# Patient Record
Sex: FEMALE | Race: WHITE | NOT HISPANIC OR LATINO
[De-identification: names, ages, dates, MRNs, and addresses within clinical notes are randomized per-mention and may not be internally consistent; named-entity substitution may affect disease eponyms.]

---

## 2017-02-27 ENCOUNTER — APPOINTMENT (OUTPATIENT)
Dept: FAMILY MEDICINE | Facility: CLINIC | Age: 82
End: 2017-02-27

## 2017-02-27 VITALS
DIASTOLIC BLOOD PRESSURE: 70 MMHG | HEIGHT: 63.5 IN | BODY MASS INDEX: 24.85 KG/M2 | WEIGHT: 142 LBS | SYSTOLIC BLOOD PRESSURE: 110 MMHG

## 2017-02-27 DIAGNOSIS — Z87.891 PERSONAL HISTORY OF NICOTINE DEPENDENCE: ICD-10-CM

## 2017-02-27 DIAGNOSIS — Z86.39 PERSONAL HISTORY OF OTHER ENDOCRINE, NUTRITIONAL AND METABOLIC DISEASE: ICD-10-CM

## 2017-02-27 DIAGNOSIS — Z87.898 PERSONAL HISTORY OF OTHER SPECIFIED CONDITIONS: ICD-10-CM

## 2017-02-27 DIAGNOSIS — Z80.1 FAMILY HISTORY OF MALIGNANT NEOPLASM OF TRACHEA, BRONCHUS AND LUNG: ICD-10-CM

## 2017-02-27 DIAGNOSIS — Z83.3 FAMILY HISTORY OF DIABETES MELLITUS: ICD-10-CM

## 2017-02-27 DIAGNOSIS — Z82.49 FAMILY HISTORY OF ISCHEMIC HEART DISEASE AND OTHER DISEASES OF THE CIRCULATORY SYSTEM: ICD-10-CM

## 2017-03-20 ENCOUNTER — OTHER (OUTPATIENT)
Age: 82
End: 2017-03-20

## 2017-03-22 ENCOUNTER — RX RENEWAL (OUTPATIENT)
Age: 82
End: 2017-03-22

## 2017-03-22 ENCOUNTER — MEDICATION RENEWAL (OUTPATIENT)
Age: 82
End: 2017-03-22

## 2017-06-21 ENCOUNTER — MEDICATION RENEWAL (OUTPATIENT)
Age: 82
End: 2017-06-21

## 2017-07-02 ENCOUNTER — RX RENEWAL (OUTPATIENT)
Age: 82
End: 2017-07-02

## 2017-07-10 ENCOUNTER — APPOINTMENT (OUTPATIENT)
Dept: FAMILY MEDICINE | Facility: CLINIC | Age: 82
End: 2017-07-10

## 2017-07-10 VITALS
SYSTOLIC BLOOD PRESSURE: 140 MMHG | HEIGHT: 63.5 IN | DIASTOLIC BLOOD PRESSURE: 84 MMHG | BODY MASS INDEX: 25.9 KG/M2 | WEIGHT: 148 LBS

## 2017-07-10 DIAGNOSIS — E55.9 VITAMIN D DEFICIENCY, UNSPECIFIED: ICD-10-CM

## 2017-07-10 DIAGNOSIS — Z00.00 ENCOUNTER FOR GENERAL ADULT MEDICAL EXAMINATION W/OUT ABNORMAL FINDINGS: ICD-10-CM

## 2017-07-10 RX ORDER — MULTIVIT-MIN/FOLIC/VIT K/LYCOP 400-300MCG
25 MCG TABLET ORAL DAILY
Qty: 90 | Refills: 1 | Status: DISCONTINUED | COMMUNITY
Start: 2017-03-20 | End: 2017-07-10

## 2017-08-24 ENCOUNTER — APPOINTMENT (OUTPATIENT)
Dept: GASTROENTEROLOGY | Facility: CLINIC | Age: 82
End: 2017-08-24
Payer: MEDICARE

## 2017-08-24 VITALS
RESPIRATION RATE: 16 BRPM | SYSTOLIC BLOOD PRESSURE: 162 MMHG | BODY MASS INDEX: 25.69 KG/M2 | HEIGHT: 63 IN | WEIGHT: 145 LBS | HEART RATE: 95 BPM | OXYGEN SATURATION: 98 % | DIASTOLIC BLOOD PRESSURE: 88 MMHG

## 2017-08-24 PROCEDURE — 99204 OFFICE O/P NEW MOD 45 MIN: CPT

## 2017-08-24 PROCEDURE — 99358 PROLONG SERVICE W/O CONTACT: CPT

## 2017-08-30 DIAGNOSIS — E21.3 HYPERPARATHYROIDISM, UNSPECIFIED: ICD-10-CM

## 2017-08-31 ENCOUNTER — CLINICAL ADVICE (OUTPATIENT)
Age: 82
End: 2017-08-31

## 2017-08-31 LAB
AFP-TM SERPL-MCNC: 4 NG/ML
ALBUMIN SERPL ELPH-MCNC: 3.9 G/DL
ALP BLD-CCNC: 97 U/L
ALT SERPL-CCNC: 25 U/L
ANION GAP SERPL CALC-SCNC: 16 MMOL/L
AST SERPL-CCNC: 36 U/L
BASOPHILS # BLD AUTO: 0.03 K/UL
BASOPHILS NFR BLD AUTO: 0.5 %
BILIRUB SERPL-MCNC: 0.8 MG/DL
BUN SERPL-MCNC: 32 MG/DL
CALCIUM SERPL-MCNC: 10 MG/DL
CHLORIDE SERPL-SCNC: 99 MMOL/L
CO2 SERPL-SCNC: 23 MMOL/L
CREAT SERPL-MCNC: 1.68 MG/DL
EOSINOPHIL # BLD AUTO: 0.16 K/UL
EOSINOPHIL NFR BLD AUTO: 2.7 %
GLUCOSE SERPL-MCNC: 182 MG/DL
HCT VFR BLD CALC: 44.4 %
HGB BLD-MCNC: 14.7 G/DL
IMM GRANULOCYTES NFR BLD AUTO: 0.7 %
LYMPHOCYTES # BLD AUTO: 0.78 K/UL
LYMPHOCYTES NFR BLD AUTO: 13 %
MAN DIFF?: NORMAL
MCHC RBC-ENTMCNC: 31.4 PG
MCHC RBC-ENTMCNC: 33.1 GM/DL
MCV RBC AUTO: 94.9 FL
MONOCYTES # BLD AUTO: 0.64 K/UL
MONOCYTES NFR BLD AUTO: 10.6 %
NEUTROPHILS # BLD AUTO: 4.37 K/UL
NEUTROPHILS NFR BLD AUTO: 72.5 %
PLATELET # BLD AUTO: 125 K/UL
POTASSIUM SERPL-SCNC: 5.6 MMOL/L
PROT SERPL-MCNC: 6.4 G/DL
RBC # BLD: 4.68 M/UL
RBC # FLD: 13.3 %
SODIUM SERPL-SCNC: 138 MMOL/L
WBC # FLD AUTO: 6.02 K/UL

## 2017-09-07 ENCOUNTER — OUTPATIENT (OUTPATIENT)
Dept: OUTPATIENT SERVICES | Facility: HOSPITAL | Age: 82
LOS: 1 days | End: 2017-09-07
Payer: MEDICARE

## 2017-09-07 ENCOUNTER — APPOINTMENT (OUTPATIENT)
Dept: ULTRASOUND IMAGING | Facility: CLINIC | Age: 82
End: 2017-09-07
Payer: MEDICARE

## 2017-09-07 DIAGNOSIS — K74.60 UNSPECIFIED CIRRHOSIS OF LIVER: ICD-10-CM

## 2017-09-07 PROCEDURE — 76700 US EXAM ABDOM COMPLETE: CPT | Mod: 26

## 2017-09-07 PROCEDURE — 76700 US EXAM ABDOM COMPLETE: CPT

## 2017-09-08 LAB
ALBUMIN SERPL ELPH-MCNC: 3.8 G/DL
ALP BLD-CCNC: 81 U/L
ALT SERPL-CCNC: 23 U/L
ANION GAP SERPL CALC-SCNC: 17 MMOL/L
AST SERPL-CCNC: 39 U/L
BILIRUB SERPL-MCNC: 1.3 MG/DL
BUN SERPL-MCNC: 33 MG/DL
CALCIUM SERPL-MCNC: 9.2 MG/DL
CHLORIDE SERPL-SCNC: 103 MMOL/L
CO2 SERPL-SCNC: 20 MMOL/L
CREAT SERPL-MCNC: 1.49 MG/DL
GLUCOSE SERPL-MCNC: 120 MG/DL
POTASSIUM SERPL-SCNC: 4.6 MMOL/L
PROT SERPL-MCNC: 6.1 G/DL
SODIUM SERPL-SCNC: 140 MMOL/L

## 2017-09-29 ENCOUNTER — CLINICAL ADVICE (OUTPATIENT)
Age: 82
End: 2017-09-29

## 2017-10-13 LAB
ANION GAP SERPL CALC-SCNC: 14 MMOL/L
BUN SERPL-MCNC: 13 MG/DL
CALCIUM SERPL-MCNC: 9.4 MG/DL
CHLORIDE SERPL-SCNC: 106 MMOL/L
CO2 SERPL-SCNC: 24 MMOL/L
CREAT SERPL-MCNC: 1.02 MG/DL
GLUCOSE SERPL-MCNC: 121 MG/DL
POTASSIUM SERPL-SCNC: 4.4 MMOL/L
SODIUM SERPL-SCNC: 144 MMOL/L

## 2017-11-15 ENCOUNTER — MOBILE ON CALL (OUTPATIENT)
Age: 82
End: 2017-11-15

## 2017-11-27 ENCOUNTER — APPOINTMENT (OUTPATIENT)
Dept: FAMILY MEDICINE | Facility: CLINIC | Age: 82
End: 2017-11-27
Payer: MEDICARE

## 2017-11-27 ENCOUNTER — NON-APPOINTMENT (OUTPATIENT)
Age: 82
End: 2017-11-27

## 2017-11-27 VITALS
DIASTOLIC BLOOD PRESSURE: 74 MMHG | WEIGHT: 168 LBS | SYSTOLIC BLOOD PRESSURE: 164 MMHG | HEIGHT: 63 IN | BODY MASS INDEX: 29.77 KG/M2

## 2017-11-27 DIAGNOSIS — I35.0 NONRHEUMATIC AORTIC (VALVE) STENOSIS: ICD-10-CM

## 2017-11-27 PROCEDURE — 99214 OFFICE O/P EST MOD 30 MIN: CPT | Mod: 25

## 2017-11-27 PROCEDURE — 93000 ELECTROCARDIOGRAM COMPLETE: CPT

## 2017-11-27 RX ORDER — SPIRONOLACTONE 100 MG/1
100 TABLET ORAL DAILY
Qty: 90 | Refills: 0 | Status: DISCONTINUED | COMMUNITY
Start: 2017-07-02 | End: 2017-11-27

## 2018-01-02 ENCOUNTER — LABORATORY RESULT (OUTPATIENT)
Age: 83
End: 2018-01-02

## 2018-01-11 LAB
ALBUMIN SERPL ELPH-MCNC: 3.7 G/DL
ALP BLD-CCNC: 154 U/L
ALT SERPL-CCNC: 14 U/L
ANION GAP SERPL CALC-SCNC: 12 MMOL/L
AST SERPL-CCNC: 42 U/L
BASOPHILS # BLD AUTO: 0.02 K/UL
BASOPHILS NFR BLD AUTO: 0.5 %
BILIRUB SERPL-MCNC: 0.7 MG/DL
BUN SERPL-MCNC: 21 MG/DL
CALCIUM SERPL-MCNC: 9.1 MG/DL
CHLORIDE SERPL-SCNC: 107 MMOL/L
CHOLEST SERPL-MCNC: 190 MG/DL
CHOLEST/HDLC SERPL: 2.6 RATIO
CO2 SERPL-SCNC: 25 MMOL/L
CREAT SERPL-MCNC: 1.14 MG/DL
EOSINOPHIL # BLD AUTO: 0.18 K/UL
EOSINOPHIL NFR BLD AUTO: 4.7
GLUCOSE SERPL-MCNC: 133 MG/DL
HCT VFR BLD CALC: 41.9 %
HDLC SERPL-MCNC: 72 MG/DL
HGB BLD-MCNC: 13.2 G/DL
IMM GRANULOCYTES NFR BLD AUTO: 0.3 %
LDLC SERPL CALC-MCNC: 96 MG/DL
LYMPHOCYTES # BLD AUTO: 0.56 K/UL
LYMPHOCYTES NFR BLD AUTO: 14.8 %
MAN DIFF?: NORMAL
MCHC RBC-ENTMCNC: 30.9 PG
MCHC RBC-ENTMCNC: 31.5 GM/DL
MCV RBC AUTO: 98.1 FL
MONOCYTES # BLD AUTO: 0.49 K/UL
MONOCYTES NFR BLD AUTO: 12.9 %
NEUTROPHILS # BLD AUTO: 2.53 K/UL
NEUTROPHILS NFR BLD AUTO: 66.8 %
PLATELET # BLD AUTO: 98 K/UL
POTASSIUM SERPL-SCNC: 4.6 MMOL/L
PROT SERPL-MCNC: 5.9 G/DL
RBC # BLD: 4.27 M/UL
RBC # FLD: 14.3 %
SODIUM SERPL-SCNC: 144 MMOL/L
TRIGL SERPL-MCNC: 108 MG/DL
TSH SERPL-ACNC: 4.49 UIU/ML
WBC # FLD AUTO: 3.79 K/UL

## 2018-01-17 ENCOUNTER — APPOINTMENT (OUTPATIENT)
Dept: FAMILY MEDICINE | Facility: CLINIC | Age: 83
End: 2018-01-17
Payer: MEDICARE

## 2018-01-17 VITALS — DIASTOLIC BLOOD PRESSURE: 64 MMHG | SYSTOLIC BLOOD PRESSURE: 130 MMHG

## 2018-01-17 VITALS
TEMPERATURE: 98 F | WEIGHT: 159 LBS | BODY MASS INDEX: 28.17 KG/M2 | HEIGHT: 63 IN | DIASTOLIC BLOOD PRESSURE: 80 MMHG | SYSTOLIC BLOOD PRESSURE: 148 MMHG

## 2018-01-17 DIAGNOSIS — R94.6 ABNORMAL RESULTS OF THYROID FUNCTION STUDIES: ICD-10-CM

## 2018-01-17 DIAGNOSIS — Z86.39 PERSONAL HISTORY OF OTHER ENDOCRINE, NUTRITIONAL AND METABOLIC DISEASE: ICD-10-CM

## 2018-01-17 PROCEDURE — G0009: CPT

## 2018-01-17 PROCEDURE — 99214 OFFICE O/P EST MOD 30 MIN: CPT | Mod: 25

## 2018-01-17 PROCEDURE — 90670 PCV13 VACCINE IM: CPT

## 2018-02-13 ENCOUNTER — OUTPATIENT (OUTPATIENT)
Dept: OUTPATIENT SERVICES | Facility: HOSPITAL | Age: 83
LOS: 1 days | End: 2018-02-13
Payer: MEDICARE

## 2018-02-13 ENCOUNTER — TRANSCRIPTION ENCOUNTER (OUTPATIENT)
Age: 83
End: 2018-02-13

## 2018-02-13 VITALS
TEMPERATURE: 98 F | HEART RATE: 81 BPM | RESPIRATION RATE: 20 BRPM | SYSTOLIC BLOOD PRESSURE: 167 MMHG | OXYGEN SATURATION: 100 % | DIASTOLIC BLOOD PRESSURE: 73 MMHG

## 2018-02-13 VITALS
DIASTOLIC BLOOD PRESSURE: 60 MMHG | HEART RATE: 60 BPM | SYSTOLIC BLOOD PRESSURE: 129 MMHG | RESPIRATION RATE: 18 BRPM | OXYGEN SATURATION: 97 %

## 2018-02-13 DIAGNOSIS — Z90.710 ACQUIRED ABSENCE OF BOTH CERVIX AND UTERUS: Chronic | ICD-10-CM

## 2018-02-13 DIAGNOSIS — R93.1 ABNORMAL FINDINGS ON DIAGNOSTIC IMAGING OF HEART AND CORONARY CIRCULATION: ICD-10-CM

## 2018-02-13 DIAGNOSIS — Z98.49 CATARACT EXTRACTION STATUS, UNSPECIFIED EYE: Chronic | ICD-10-CM

## 2018-02-13 DIAGNOSIS — Z90.49 ACQUIRED ABSENCE OF OTHER SPECIFIED PARTS OF DIGESTIVE TRACT: Chronic | ICD-10-CM

## 2018-02-13 DIAGNOSIS — Z98.890 OTHER SPECIFIED POSTPROCEDURAL STATES: Chronic | ICD-10-CM

## 2018-02-13 DIAGNOSIS — I10 ESSENTIAL (PRIMARY) HYPERTENSION: ICD-10-CM

## 2018-02-13 DIAGNOSIS — R94.31 ABNORMAL ELECTROCARDIOGRAM [ECG] [EKG]: ICD-10-CM

## 2018-02-13 DIAGNOSIS — R07.9 CHEST PAIN, UNSPECIFIED: ICD-10-CM

## 2018-02-13 LAB
ANION GAP SERPL CALC-SCNC: 12 MMOL/L — SIGNIFICANT CHANGE UP (ref 5–17)
APTT BLD: 34 SEC — SIGNIFICANT CHANGE UP (ref 27.5–37.4)
BUN SERPL-MCNC: 18 MG/DL — SIGNIFICANT CHANGE UP (ref 8–20)
CALCIUM SERPL-MCNC: 9 MG/DL — SIGNIFICANT CHANGE UP (ref 8.6–10.2)
CHLORIDE SERPL-SCNC: 105 MMOL/L — SIGNIFICANT CHANGE UP (ref 98–107)
CO2 SERPL-SCNC: 25 MMOL/L — SIGNIFICANT CHANGE UP (ref 22–29)
CREAT SERPL-MCNC: 0.83 MG/DL — SIGNIFICANT CHANGE UP (ref 0.5–1.3)
GLUCOSE SERPL-MCNC: 101 MG/DL — SIGNIFICANT CHANGE UP (ref 70–115)
HCT VFR BLD CALC: 41.6 % — SIGNIFICANT CHANGE UP (ref 37–47)
HGB BLD-MCNC: 13.7 G/DL — SIGNIFICANT CHANGE UP (ref 12–16)
INR BLD: 1.12 RATIO — SIGNIFICANT CHANGE UP (ref 0.88–1.16)
MCHC RBC-ENTMCNC: 31.1 PG — HIGH (ref 27–31)
MCHC RBC-ENTMCNC: 32.9 G/DL — SIGNIFICANT CHANGE UP (ref 32–36)
MCV RBC AUTO: 94.3 FL — SIGNIFICANT CHANGE UP (ref 81–99)
PLATELET # BLD AUTO: 84 K/UL — LOW (ref 150–400)
POTASSIUM SERPL-MCNC: 4.2 MMOL/L — SIGNIFICANT CHANGE UP (ref 3.5–5.3)
POTASSIUM SERPL-SCNC: 4.2 MMOL/L — SIGNIFICANT CHANGE UP (ref 3.5–5.3)
PROTHROM AB SERPL-ACNC: 12.3 SEC — SIGNIFICANT CHANGE UP (ref 9.8–12.7)
RBC # BLD: 4.41 M/UL — SIGNIFICANT CHANGE UP (ref 4.4–5.2)
RBC # FLD: 13.6 % — SIGNIFICANT CHANGE UP (ref 11–15.6)
SODIUM SERPL-SCNC: 142 MMOL/L — SIGNIFICANT CHANGE UP (ref 135–145)
WBC # BLD: 3.6 K/UL — LOW (ref 4.8–10.8)
WBC # FLD AUTO: 3.6 K/UL — LOW (ref 4.8–10.8)

## 2018-02-13 PROCEDURE — C1769: CPT

## 2018-02-13 PROCEDURE — 80048 BASIC METABOLIC PNL TOTAL CA: CPT

## 2018-02-13 PROCEDURE — 85730 THROMBOPLASTIN TIME PARTIAL: CPT

## 2018-02-13 PROCEDURE — 93010 ELECTROCARDIOGRAM REPORT: CPT

## 2018-02-13 PROCEDURE — C1894: CPT

## 2018-02-13 PROCEDURE — 85027 COMPLETE CBC AUTOMATED: CPT

## 2018-02-13 PROCEDURE — C1887: CPT

## 2018-02-13 PROCEDURE — 36415 COLL VENOUS BLD VENIPUNCTURE: CPT

## 2018-02-13 PROCEDURE — 99152 MOD SED SAME PHYS/QHP 5/>YRS: CPT

## 2018-02-13 PROCEDURE — 93005 ELECTROCARDIOGRAM TRACING: CPT

## 2018-02-13 PROCEDURE — 85610 PROTHROMBIN TIME: CPT

## 2018-02-13 PROCEDURE — 93458 L HRT ARTERY/VENTRICLE ANGIO: CPT

## 2018-02-13 RX ORDER — SODIUM CHLORIDE 9 MG/ML
1000 INJECTION INTRAMUSCULAR; INTRAVENOUS; SUBCUTANEOUS
Qty: 0 | Refills: 0 | Status: DISCONTINUED | OUTPATIENT
Start: 2018-02-13 | End: 2018-02-28

## 2018-02-13 RX ORDER — METOPROLOL TARTRATE 50 MG
12.5 TABLET ORAL
Qty: 0 | Refills: 0 | Status: DISCONTINUED | OUTPATIENT
Start: 2018-02-13 | End: 2018-02-28

## 2018-02-13 RX ORDER — AMLODIPINE BESYLATE 2.5 MG/1
2.5 TABLET ORAL DAILY
Qty: 0 | Refills: 0 | Status: DISCONTINUED | OUTPATIENT
Start: 2018-02-13 | End: 2018-02-28

## 2018-02-13 RX ORDER — METOPROLOL TARTRATE 50 MG
25 TABLET ORAL
Qty: 30 | Refills: 2 | OUTPATIENT
Start: 2018-02-13

## 2018-02-13 RX ADMIN — AMLODIPINE BESYLATE 2.5 MILLIGRAM(S): 2.5 TABLET ORAL at 11:06

## 2018-02-13 RX ADMIN — SODIUM CHLORIDE 30 MILLILITER(S): 9 INJECTION INTRAMUSCULAR; INTRAVENOUS; SUBCUTANEOUS at 10:45

## 2018-02-13 RX ADMIN — Medication 12.5 MILLIGRAM(S): at 10:43

## 2018-02-13 NOTE — DISCHARGE NOTE ADULT - PATIENT PORTAL LINK FT
You can access the [facility] Portal, offered by [facility], by registering with the following website: [email or web address]

## 2018-02-13 NOTE — DISCHARGE NOTE ADULT - NS AS ACTIVITY OBS
Walking-Outdoors allowed/Do not make important decisions/Do not drive or operate machinery/No Heavy lifting/straining/Showering allowed/Walking-Indoors allowed

## 2018-02-13 NOTE — DISCHARGE NOTE ADULT - CARE PLAN
Principal Discharge DX:	CAD (coronary artery disease)  Goal:	ADL without SOB  Assessment and plan of treatment:	No heavy lifting, driving, sex, tub baths, swimming, or any activity that submerges the lower half of the body in water for 48 hours.  Limited walking and stairs for 48 hours.    Change the bandaid after 24 hours and every 24 hours after that.  Keep the puncture site dry and covered with a bandaid until a scab forms.    Observe the site frequently.  If bleeding or a large lump (the size of a golf ball or bigger) occurs lie flat, apply continuous direct pressure just above the puncture site for at least 10 minutes, and notify your physician immediately.  If the bleeding cannot be controlled, call 911 immediately for assistance.  Notify your physician of pain, swelling or any drainage.    Notify your physician immediately if coldness, numbness, discoloration or pain in your foot occurs.

## 2018-02-13 NOTE — DISCHARGE NOTE ADULT - MEDICATION SUMMARY - MEDICATIONS TO TAKE
I will START or STAY ON the medications listed below when I get home from the hospital:    aspirin 81 mg oral tablet, chewable  -- 1 tab(s) by mouth once a day  -- Indication: For CAD (coronary artery disease)    enalapril 2.5 mg oral tablet  -- 1 tab(s) by mouth once a day   -- Do not take this drug if you are pregnant.  It is very important that you take or use this exactly as directed.  Do not skip doses or discontinue unless directed by your doctor.  Some non-prescription drugs may aggravate your condition.  Read all labels carefully.  If a warning appears, check with your doctor before taking.    -- Indication: For Hypertension    Toprol-XL 25 mg oral tablet, extended release  -- 25 tab(s) by mouth once a day   -- It is very important that you take or use this exactly as directed.  Do not skip doses or discontinue unless directed by your doctor.  May cause drowsiness.  Alcohol may intensify this effect.  Use care when operating dangerous machinery.  Some non-prescription drugs may aggravate your condition.  Read all labels carefully.  If a warning appears, check with your doctor before taking.  Swallow whole.  Do not crush.  Take with food or milk.  This drug may impair the ability to drive or operate machinery.  Use care until you become familiar with its effects.    -- Indication: For Hypertension    furosemide 20 mg oral tablet  -- 1 tab(s) by mouth once a day  -- Indication: For Ascities

## 2018-02-13 NOTE — PROGRESS NOTE ADULT - SUBJECTIVE AND OBJECTIVE BOX
2V CAD  Medical management  ACE and beta blocker as per Dr Trejo  /70  Right groin benign  D/c after 3 hours post sheath removal  F/U with Dr Zaman

## 2018-02-13 NOTE — H&P PST ADULT - PMH
Cirrhosis of liver    NAFLD (nonalcoholic fatty liver disease)    White coat syndrome without diagnosis of hypertension

## 2018-02-13 NOTE — CONSULT NOTE ADULT - SUBJECTIVE AND OBJECTIVE BOX
Patient is a 82y old  Female who presents with a chief complaint of Abnormal echocardiogram      HPI:  81 yo female who had an abnormal EKG in her PMD office and was sent to see cardiologist for follow up.  Patient sent to see Dr Zaman and had echocardiogram.  Echo showed Ef 40-45%, hypokenisis of apical anterior, apical septal, apical lateral and apical inferior walls and thinning and hypokineses and mid rula/septum with akinesis of the apex. She has been having recent onset of dyspnea on exertion with Class III symptoms      PAST MEDICAL & SURGICAL HISTORY:  Cirrhosis of liver  NAFLD (nonalcoholic fatty liver disease)  White coat syndrome without diagnosis of hypertension  S/P cataract surgery  S/P cholecystectomy  S/P ANURAG-BSO  History of appendectomy      Allergies    No Known Allergies    Intolerances        MEDICATIONS  (STANDING):  amLODIPine   Tablet 2.5 milliGRAM(s) Oral daily  metoprolol     tartrate 12.5 milliGRAM(s) Oral two times a day  sodium chloride 0.9%. 1000 milliLiter(s) (30 mL/Hr) IV Continuous <Continuous>    MEDICATIONS  (PRN):    amLODIPine   Tablet 2.5 milliGRAM(s) Oral daily  metoprolol     tartrate 12.5 milliGRAM(s) Oral two times a day  sodium chloride 0.9%. 1000 milliLiter(s) IV Continuous <Continuous>      FAMILY HISTORY:      SOCIAL HISTORY:    CIGARETTES: None    ALCOHOL: Rare    REVIEW OF SYSTEMS:  CONSTITUTIONAL: No fever, weight loss, or fatigue  EYES: No eye pain, visual disturbances, or discharge  ENMT:  No difficulty hearing, tinnitus, vertigo; No sinus or throat pain  NECK: No pain or stiffness  RESPIRATORY: No cough, wheezing, chills or hemoptysis; No Shortness of Breath  CARDIOVASCULAR: No chest pain, palpitations, passing out, dizziness, or leg swelling  GASTROINTESTINAL: No abdominal or epigastric pain. No nausea, vomiting, or hematemesis; No diarrhea or constipation. No melena or hematochezia.  GENITOURINARY: No dysuria, frequency, hematuria, or incontinence  NEUROLOGICAL: No headaches, memory loss, loss of strength, numbness, or tremors  SKIN: No itching, burning, rashes, or lesions   LYMPH Nodes: No enlarged glands  ENDOCRINE: No heat or cold intolerance; No hair loss  MUSCULOSKELETAL: No joint pain or swelling; No muscle, back, or extremity pain  PSYCHIATRIC: No depression, anxiety, mood swings, or difficulty sleeping  HEME/LYMPH: No easy bruising, or bleeding gums  ALLERY AND IMMUNOLOGIC: No hives or eczema	    Vital Signs Last 24 Hrs  T(C): 36.6 (2018 09:47), Max: 36.6 (2018 09:47)  T(F): 97.8 (2018 09:47), Max: 97.8 (2018 09:47)  HR: 60 (2018 19:04) (54 - 81)  BP: 129/60 (2018 19:04) (129/60 - 179/72)  BP(mean): --  RR: 18 (2018 19:04) (12 - 20)  SpO2: 97% (2018 19:04) (96% - 100%)    Daily Height in cm: 160.02 (2018 10:29)    Daily Weight in k.2 (2018 17:03)    I&O's Detail    2018 07:01  -  2018 21:35  --------------------------------------------------------  IN:  Total IN: 0 mL    OUT:    Voided: 500 mL  Total OUT: 500 mL    Total NET: -500 mL          PHYSICAL EXAM:  Appearance: Normal, well nourished	  HEENT:   Normal oral mucosa, PERRL, EOMI, sclera non-icteric	  Lymphatic: No cervical lymphadenopathy  Cardiovascular: Normal S1 S2, No JVD, No cardiac murmurs, No carotid bruits, No peripheral edema  Respiratory: Lungs clear to auscultation	  Psychiatry: A & O x 3, Mood & affect appropriate  Gastrointestinal:  Soft, Non-tender, + BS, no bruits	  Skin: No rashes, No ecchymoses, No cyanosis  Neurologic: Grossly non-focal with full strength in all four extremities  Extremities: Normal range of motion, No clubbing, cyanosis or edema  Vascular: Peripheral pulses palpable 2+ bilaterally    LABS:                        13.7   3.6   )-----------( 84       ( 2018 10:24 )             41.6     02-13    142  |  105  |  18.0  ----------------------------<  101  4.2   |  25.0  |  0.83    Ca    9.0      2018 10:24          PT/INR - ( 2018 10:24 )   PT: 12.3 sec;   INR: 1.12 ratio         PTT - ( 2018 10:24 )  PTT:34.0 sec    I&O's Summary    2018 07:01  -  2018 21:35  --------------------------------------------------------  IN: 0 mL / OUT: 500 mL / NET: -500 mL      BNP  RADIOLOGY & ADDITIONAL STUDIES:    Assessment:  81 yo female who had an abnormal EKG in her PMD office and was sent to see cardiologist for follow up.  Patient sent to see Dr Zaman and had echocardiogram.  Echo showed Ef 40-45%, hypokenisis of apical anterior, apical septal, apical lateral and apical inferior walls and thinning and hypokineses and mid rula/septum with akinesis of the apex. She has been having recent onset of dyspnea on exertion with Class III symptoms    Plan:  Cardiac catheterization and possible percutaneous intervention recommended.  Risks, benefits, and alternatives reviewed.  Risks including but not limited to MI, death, stroke, bleeding, infection, vessel injury, hematoma, renal failure, allergic reaction, urgent open heart surgery, restenosis and stent thrombosis were reviewed.  All questions answered.  Patient is agreeable to proceed.

## 2018-02-13 NOTE — H&P PST ADULT - HISTORY OF PRESENT ILLNESS
83 yo female who had an abnormal EKG in her PMD office and was sent to see cardiologist for follow up.  Patient sent to see Dr Zaman and had echocardiogram.  Echo showed Ef 40-45%, hypokenisis of apical anterior, apical septal, apical lateral and apical inferior walls and thinning and hypokineses and mid rula/septum with akinesis of the apex.

## 2018-02-13 NOTE — DISCHARGE NOTE ADULT - CARE PROVIDER_API CALL
Rowdy Zaman), Cardiology; Internal Medicine  [address] [place] [address]  Phone: [phone]  Fax: [phone]

## 2018-02-13 NOTE — DISCHARGE NOTE ADULT - HOSPITAL COURSE
Elective cath for abnormal echo shows 2 vessel CAD.  Plan medically optimize and perhaps return for  of RCA and LAD PCI.  Will continue to monitor.

## 2018-02-14 ENCOUNTER — MOBILE ON CALL (OUTPATIENT)
Age: 83
End: 2018-02-14

## 2018-02-15 ENCOUNTER — OUTPATIENT (OUTPATIENT)
Dept: OUTPATIENT SERVICES | Facility: HOSPITAL | Age: 83
LOS: 1 days | Discharge: HOME | End: 2018-02-15

## 2018-02-15 DIAGNOSIS — Z90.710 ACQUIRED ABSENCE OF BOTH CERVIX AND UTERUS: Chronic | ICD-10-CM

## 2018-02-15 DIAGNOSIS — Z98.890 OTHER SPECIFIED POSTPROCEDURAL STATES: Chronic | ICD-10-CM

## 2018-02-15 DIAGNOSIS — D69.6 THROMBOCYTOPENIA, UNSPECIFIED: ICD-10-CM

## 2018-02-15 DIAGNOSIS — Z98.49 CATARACT EXTRACTION STATUS, UNSPECIFIED EYE: Chronic | ICD-10-CM

## 2018-02-15 DIAGNOSIS — Z90.49 ACQUIRED ABSENCE OF OTHER SPECIFIED PARTS OF DIGESTIVE TRACT: Chronic | ICD-10-CM

## 2018-02-16 ENCOUNTER — MOBILE ON CALL (OUTPATIENT)
Age: 83
End: 2018-02-16

## 2018-02-20 ENCOUNTER — APPOINTMENT (OUTPATIENT)
Dept: GASTROENTEROLOGY | Facility: CLINIC | Age: 83
End: 2018-02-20
Payer: MEDICARE

## 2018-02-20 ENCOUNTER — APPOINTMENT (OUTPATIENT)
Dept: GASTROENTEROLOGY | Facility: CLINIC | Age: 83
End: 2018-02-20

## 2018-02-20 VITALS
WEIGHT: 157 LBS | HEIGHT: 63 IN | RESPIRATION RATE: 16 BRPM | HEART RATE: 67 BPM | BODY MASS INDEX: 27.82 KG/M2 | OXYGEN SATURATION: 98 % | SYSTOLIC BLOOD PRESSURE: 175 MMHG | DIASTOLIC BLOOD PRESSURE: 83 MMHG

## 2018-02-20 PROCEDURE — 99214 OFFICE O/P EST MOD 30 MIN: CPT

## 2018-02-21 ENCOUNTER — RESULT REVIEW (OUTPATIENT)
Age: 83
End: 2018-02-21

## 2018-02-21 ENCOUNTER — APPOINTMENT (OUTPATIENT)
Dept: ULTRASOUND IMAGING | Facility: CLINIC | Age: 83
End: 2018-02-21
Payer: MEDICARE

## 2018-02-21 ENCOUNTER — APPOINTMENT (OUTPATIENT)
Dept: HEMATOLOGY ONCOLOGY | Facility: CLINIC | Age: 83
End: 2018-02-21
Payer: MEDICARE

## 2018-02-21 ENCOUNTER — OUTPATIENT (OUTPATIENT)
Dept: OUTPATIENT SERVICES | Facility: HOSPITAL | Age: 83
LOS: 1 days | End: 2018-02-21

## 2018-02-21 VITALS
BODY MASS INDEX: 28.69 KG/M2 | HEART RATE: 67 BPM | OXYGEN SATURATION: 98 % | TEMPERATURE: 98.9 F | DIASTOLIC BLOOD PRESSURE: 75 MMHG | HEIGHT: 63.78 IN | SYSTOLIC BLOOD PRESSURE: 170 MMHG | WEIGHT: 166.01 LBS

## 2018-02-21 DIAGNOSIS — Z90.710 ACQUIRED ABSENCE OF BOTH CERVIX AND UTERUS: Chronic | ICD-10-CM

## 2018-02-21 DIAGNOSIS — Z98.49 CATARACT EXTRACTION STATUS, UNSPECIFIED EYE: Chronic | ICD-10-CM

## 2018-02-21 DIAGNOSIS — Z00.8 ENCOUNTER FOR OTHER GENERAL EXAMINATION: ICD-10-CM

## 2018-02-21 DIAGNOSIS — Z98.890 OTHER SPECIFIED POSTPROCEDURAL STATES: Chronic | ICD-10-CM

## 2018-02-21 DIAGNOSIS — Z90.49 ACQUIRED ABSENCE OF OTHER SPECIFIED PARTS OF DIGESTIVE TRACT: Chronic | ICD-10-CM

## 2018-02-21 LAB
BASOPHILS # BLD AUTO: 0.1 K/UL — SIGNIFICANT CHANGE UP (ref 0–0.2)
BASOPHILS NFR BLD AUTO: 1.6 % — SIGNIFICANT CHANGE UP (ref 0–2)
EOSINOPHIL # BLD AUTO: 0.3 K/UL — SIGNIFICANT CHANGE UP (ref 0–0.5)
EOSINOPHIL NFR BLD AUTO: 6 % — SIGNIFICANT CHANGE UP (ref 0–6)
HCT VFR BLD CALC: 40.1 % — SIGNIFICANT CHANGE UP (ref 34.5–45)
HGB BLD-MCNC: 13.8 G/DL — SIGNIFICANT CHANGE UP (ref 11.5–15.5)
LYMPHOCYTES # BLD AUTO: 0.5 K/UL — LOW (ref 1–3.3)
LYMPHOCYTES # BLD AUTO: 10.8 % — LOW (ref 13–44)
MCHC RBC-ENTMCNC: 32.1 PG — SIGNIFICANT CHANGE UP (ref 27–34)
MCHC RBC-ENTMCNC: 34.4 GM/DL — SIGNIFICANT CHANGE UP (ref 32–36)
MCV RBC AUTO: 93.4 FL — SIGNIFICANT CHANGE UP (ref 80–100)
MONOCYTES # BLD AUTO: 0.4 K/UL — SIGNIFICANT CHANGE UP (ref 0–0.9)
MONOCYTES NFR BLD AUTO: 8.4 % — SIGNIFICANT CHANGE UP (ref 2–14)
NEUTROPHILS # BLD AUTO: 3.4 K/UL — SIGNIFICANT CHANGE UP (ref 1.8–7.4)
NEUTROPHILS NFR BLD AUTO: 73.2 % — SIGNIFICANT CHANGE UP (ref 43–77)
PLATELET # BLD AUTO: 112 K/UL — LOW (ref 150–400)
RBC # BLD: 4.29 M/UL — SIGNIFICANT CHANGE UP (ref 3.8–5.2)
RBC # FLD: 12.5 % — SIGNIFICANT CHANGE UP (ref 10.3–14.5)
WBC # BLD: 4.7 K/UL — SIGNIFICANT CHANGE UP (ref 3.8–10.5)
WBC # FLD AUTO: 4.7 K/UL — SIGNIFICANT CHANGE UP (ref 3.8–10.5)

## 2018-02-21 PROCEDURE — 76700 US EXAM ABDOM COMPLETE: CPT | Mod: 26

## 2018-02-21 PROCEDURE — 99204 OFFICE O/P NEW MOD 45 MIN: CPT

## 2018-02-23 DIAGNOSIS — K74.60 UNSPECIFIED CIRRHOSIS OF LIVER: ICD-10-CM

## 2018-02-26 LAB — AFP-TM SERPL-MCNC: 3.6 NG/ML

## 2018-03-05 ENCOUNTER — RESULT REVIEW (OUTPATIENT)
Age: 83
End: 2018-03-05

## 2018-03-06 ENCOUNTER — OUTPATIENT (OUTPATIENT)
Dept: OUTPATIENT SERVICES | Facility: HOSPITAL | Age: 83
LOS: 1 days | End: 2018-03-06
Payer: MEDICARE

## 2018-03-06 VITALS
OXYGEN SATURATION: 99 % | SYSTOLIC BLOOD PRESSURE: 171 MMHG | DIASTOLIC BLOOD PRESSURE: 64 MMHG | WEIGHT: 156.97 LBS | TEMPERATURE: 99 F | HEART RATE: 64 BPM | HEIGHT: 63.5 IN | RESPIRATION RATE: 18 BRPM

## 2018-03-06 DIAGNOSIS — Z98.890 OTHER SPECIFIED POSTPROCEDURAL STATES: Chronic | ICD-10-CM

## 2018-03-06 DIAGNOSIS — Z01.810 ENCOUNTER FOR PREPROCEDURAL CARDIOVASCULAR EXAMINATION: ICD-10-CM

## 2018-03-06 DIAGNOSIS — Z90.710 ACQUIRED ABSENCE OF BOTH CERVIX AND UTERUS: Chronic | ICD-10-CM

## 2018-03-06 DIAGNOSIS — Z98.49 CATARACT EXTRACTION STATUS, UNSPECIFIED EYE: Chronic | ICD-10-CM

## 2018-03-06 DIAGNOSIS — I25.10 ATHEROSCLEROTIC HEART DISEASE OF NATIVE CORONARY ARTERY WITHOUT ANGINA PECTORIS: ICD-10-CM

## 2018-03-06 DIAGNOSIS — Z90.49 ACQUIRED ABSENCE OF OTHER SPECIFIED PARTS OF DIGESTIVE TRACT: Chronic | ICD-10-CM

## 2018-03-06 LAB
ALBUMIN SERPL ELPH-MCNC: 3.3 G/DL — SIGNIFICANT CHANGE UP (ref 3.3–5.2)
ALP SERPL-CCNC: 154 U/L — HIGH (ref 40–120)
ALT FLD-CCNC: 21 U/L — SIGNIFICANT CHANGE UP
ANION GAP SERPL CALC-SCNC: 11 MMOL/L — SIGNIFICANT CHANGE UP (ref 5–17)
APTT BLD: 36.3 SEC — SIGNIFICANT CHANGE UP (ref 27.5–37.4)
AST SERPL-CCNC: 43 U/L — HIGH
BASOPHILS NFR BLD AUTO: 1 % — SIGNIFICANT CHANGE UP (ref 0–2)
BILIRUB SERPL-MCNC: 0.8 MG/DL — SIGNIFICANT CHANGE UP (ref 0.4–2)
BUN SERPL-MCNC: 25 MG/DL — HIGH (ref 8–20)
CALCIUM SERPL-MCNC: 8.8 MG/DL — SIGNIFICANT CHANGE UP (ref 8.6–10.2)
CHLORIDE SERPL-SCNC: 105 MMOL/L — SIGNIFICANT CHANGE UP (ref 98–107)
CO2 SERPL-SCNC: 26 MMOL/L — SIGNIFICANT CHANGE UP (ref 22–29)
CREAT SERPL-MCNC: 1.05 MG/DL — SIGNIFICANT CHANGE UP (ref 0.5–1.3)
EOSINOPHIL NFR BLD AUTO: 13 % — HIGH (ref 0–5)
GLUCOSE SERPL-MCNC: 131 MG/DL — HIGH (ref 70–115)
HCT VFR BLD CALC: 38.6 % — SIGNIFICANT CHANGE UP (ref 37–47)
HGB BLD-MCNC: 12.9 G/DL — SIGNIFICANT CHANGE UP (ref 12–16)
INR BLD: 1.1 RATIO — SIGNIFICANT CHANGE UP (ref 0.88–1.16)
LYMPHOCYTES # BLD AUTO: 11 % — LOW (ref 20–55)
MCHC RBC-ENTMCNC: 31.7 PG — HIGH (ref 27–31)
MCHC RBC-ENTMCNC: 33.4 G/DL — SIGNIFICANT CHANGE UP (ref 32–36)
MCV RBC AUTO: 94.8 FL — SIGNIFICANT CHANGE UP (ref 81–99)
MONOCYTES NFR BLD AUTO: 8 % — SIGNIFICANT CHANGE UP (ref 3–10)
NEUTROPHILS NFR BLD AUTO: 67 % — SIGNIFICANT CHANGE UP (ref 37–73)
PLAT MORPH BLD: NORMAL — SIGNIFICANT CHANGE UP
PLATELET # BLD AUTO: 81 K/UL — LOW (ref 150–400)
POTASSIUM SERPL-MCNC: 4.1 MMOL/L — SIGNIFICANT CHANGE UP (ref 3.5–5.3)
POTASSIUM SERPL-SCNC: 4.1 MMOL/L — SIGNIFICANT CHANGE UP (ref 3.5–5.3)
PROT SERPL-MCNC: 5.9 G/DL — LOW (ref 6.6–8.7)
PROTHROM AB SERPL-ACNC: 12.1 SEC — SIGNIFICANT CHANGE UP (ref 9.8–12.7)
RBC # BLD: 4.07 M/UL — LOW (ref 4.4–5.2)
RBC # FLD: 14.2 % — SIGNIFICANT CHANGE UP (ref 11–15.6)
RBC BLD AUTO: NORMAL — SIGNIFICANT CHANGE UP
SODIUM SERPL-SCNC: 142 MMOL/L — SIGNIFICANT CHANGE UP (ref 135–145)
WBC # BLD: 3.1 K/UL — LOW (ref 4.8–10.8)
WBC # FLD AUTO: 3.1 K/UL — LOW (ref 4.8–10.8)

## 2018-03-06 PROCEDURE — G0463: CPT

## 2018-03-06 PROCEDURE — 85730 THROMBOPLASTIN TIME PARTIAL: CPT

## 2018-03-06 PROCEDURE — 36415 COLL VENOUS BLD VENIPUNCTURE: CPT

## 2018-03-06 PROCEDURE — 80053 COMPREHEN METABOLIC PANEL: CPT

## 2018-03-06 PROCEDURE — 85610 PROTHROMBIN TIME: CPT

## 2018-03-06 PROCEDURE — 93010 ELECTROCARDIOGRAM REPORT: CPT

## 2018-03-06 PROCEDURE — 93005 ELECTROCARDIOGRAM TRACING: CPT

## 2018-03-06 PROCEDURE — 85027 COMPLETE CBC AUTOMATED: CPT

## 2018-03-06 NOTE — ASU PATIENT PROFILE, ADULT - PMH
Aortic stenosis    Cirrhosis of liver    Elevated TSH    HLD (hyperlipidemia)    HTN (hypertension)    Hyperparathyroidism    NAFLD (nonalcoholic fatty liver disease)    Non-alcoholic fatty liver disease    Portal hypertension    Renal insufficiency    Thrombocytopenia    Vitamin D deficiency    White coat syndrome without diagnosis of hypertension

## 2018-03-06 NOTE — ASU PATIENT PROFILE, ADULT - PSH
History of appendectomy    S/P cataract surgery    S/P cholecystectomy    S/P coronary angiogram  feb 2018  S/P ANURAG-BSO

## 2018-03-06 NOTE — H&P PST ADULT - HISTORY OF PRESENT ILLNESS
81 yo female who had an abnormal EKG in her PMD office and was sent to see cardiologist for follow up.  Patient sent to see Dr Zaman and had echocardiogram.  Echo showed Ef 40-45%, hypokenisis of apical anterior, apical septal, apical lateral and apical inferior walls and thinning and hypokineses and mid rula/septum with akinesis of the apex. Patient here today for PST for cardiac intervention of 100% LAD & 85% RCA.

## 2018-03-12 ENCOUNTER — APPOINTMENT (OUTPATIENT)
Dept: FAMILY MEDICINE | Facility: CLINIC | Age: 83
End: 2018-03-12
Payer: MEDICARE

## 2018-03-12 VITALS
SYSTOLIC BLOOD PRESSURE: 160 MMHG | TEMPERATURE: 97.6 F | HEART RATE: 65 BPM | DIASTOLIC BLOOD PRESSURE: 60 MMHG | OXYGEN SATURATION: 98 %

## 2018-03-12 PROCEDURE — 99214 OFFICE O/P EST MOD 30 MIN: CPT

## 2018-03-13 ENCOUNTER — TRANSCRIPTION ENCOUNTER (OUTPATIENT)
Age: 83
End: 2018-03-13

## 2018-03-13 ENCOUNTER — INPATIENT (INPATIENT)
Facility: HOSPITAL | Age: 83
LOS: 1 days | Discharge: HOME | DRG: 251 | End: 2018-03-15
Attending: THORACIC SURGERY (CARDIOTHORACIC VASCULAR SURGERY) | Admitting: THORACIC SURGERY (CARDIOTHORACIC VASCULAR SURGERY)
Payer: MEDICARE

## 2018-03-13 VITALS — WEIGHT: 167.55 LBS

## 2018-03-13 DIAGNOSIS — Z98.890 OTHER SPECIFIED POSTPROCEDURAL STATES: Chronic | ICD-10-CM

## 2018-03-13 DIAGNOSIS — Z90.49 ACQUIRED ABSENCE OF OTHER SPECIFIED PARTS OF DIGESTIVE TRACT: Chronic | ICD-10-CM

## 2018-03-13 DIAGNOSIS — R07.9 CHEST PAIN, UNSPECIFIED: ICD-10-CM

## 2018-03-13 DIAGNOSIS — Z98.49 CATARACT EXTRACTION STATUS, UNSPECIFIED EYE: Chronic | ICD-10-CM

## 2018-03-13 DIAGNOSIS — I25.42 CORONARY ARTERY DISSECTION: ICD-10-CM

## 2018-03-13 DIAGNOSIS — Z90.710 ACQUIRED ABSENCE OF BOTH CERVIX AND UTERUS: Chronic | ICD-10-CM

## 2018-03-13 DIAGNOSIS — K74.69 OTHER CIRRHOSIS OF LIVER: ICD-10-CM

## 2018-03-13 DIAGNOSIS — I35.0 NONRHEUMATIC AORTIC (VALVE) STENOSIS: ICD-10-CM

## 2018-03-13 DIAGNOSIS — I10 ESSENTIAL (PRIMARY) HYPERTENSION: ICD-10-CM

## 2018-03-13 DIAGNOSIS — I50.22 CHRONIC SYSTOLIC (CONGESTIVE) HEART FAILURE: ICD-10-CM

## 2018-03-13 LAB
ABO RH CONFIRMATION: SIGNIFICANT CHANGE UP
ANION GAP SERPL CALC-SCNC: 14 MMOL/L — SIGNIFICANT CHANGE UP (ref 5–17)
BLD GP AB SCN SERPL QL: SIGNIFICANT CHANGE UP
BUN SERPL-MCNC: 24 MG/DL — HIGH (ref 8–20)
CALCIUM SERPL-MCNC: 8.2 MG/DL — LOW (ref 8.6–10.2)
CHLORIDE SERPL-SCNC: 107 MMOL/L — SIGNIFICANT CHANGE UP (ref 98–107)
CO2 SERPL-SCNC: 21 MMOL/L — LOW (ref 22–29)
CREAT SERPL-MCNC: 0.88 MG/DL — SIGNIFICANT CHANGE UP (ref 0.5–1.3)
GLUCOSE SERPL-MCNC: 133 MG/DL — HIGH (ref 70–115)
HCT VFR BLD CALC: 34.8 % — LOW (ref 37–47)
HCT VFR BLD CALC: 36.8 % — LOW (ref 37–47)
HGB BLD-MCNC: 11.4 G/DL — LOW (ref 12–16)
HGB BLD-MCNC: 12.4 G/DL — SIGNIFICANT CHANGE UP (ref 12–16)
MAGNESIUM SERPL-MCNC: 1.9 MG/DL — SIGNIFICANT CHANGE UP (ref 1.6–2.6)
MCHC RBC-ENTMCNC: 30.7 PG — SIGNIFICANT CHANGE UP (ref 27–31)
MCHC RBC-ENTMCNC: 31.4 PG — HIGH (ref 27–31)
MCHC RBC-ENTMCNC: 32.8 G/DL — SIGNIFICANT CHANGE UP (ref 32–36)
MCHC RBC-ENTMCNC: 33.7 G/DL — SIGNIFICANT CHANGE UP (ref 32–36)
MCV RBC AUTO: 93.2 FL — SIGNIFICANT CHANGE UP (ref 81–99)
MCV RBC AUTO: 93.8 FL — SIGNIFICANT CHANGE UP (ref 81–99)
PLATELET # BLD AUTO: 80 K/UL — LOW (ref 150–400)
PLATELET # BLD AUTO: 85 K/UL — LOW (ref 150–400)
POTASSIUM SERPL-MCNC: 4.1 MMOL/L — SIGNIFICANT CHANGE UP (ref 3.5–5.3)
POTASSIUM SERPL-SCNC: 4.1 MMOL/L — SIGNIFICANT CHANGE UP (ref 3.5–5.3)
RBC # BLD: 3.71 M/UL — LOW (ref 4.4–5.2)
RBC # BLD: 3.95 M/UL — LOW (ref 4.4–5.2)
RBC # FLD: 14.1 % — SIGNIFICANT CHANGE UP (ref 11–15.6)
RBC # FLD: 14.2 % — SIGNIFICANT CHANGE UP (ref 11–15.6)
SODIUM SERPL-SCNC: 142 MMOL/L — SIGNIFICANT CHANGE UP (ref 135–145)
TYPE + AB SCN PNL BLD: SIGNIFICANT CHANGE UP
WBC # BLD: 4.1 K/UL — LOW (ref 4.8–10.8)
WBC # BLD: 4.8 K/UL — SIGNIFICANT CHANGE UP (ref 4.8–10.8)
WBC # FLD AUTO: 4.1 K/UL — LOW (ref 4.8–10.8)
WBC # FLD AUTO: 4.8 K/UL — SIGNIFICANT CHANGE UP (ref 4.8–10.8)

## 2018-03-13 PROCEDURE — 93010 ELECTROCARDIOGRAM REPORT: CPT

## 2018-03-13 PROCEDURE — 93308 TTE F-UP OR LMTD: CPT | Mod: 26

## 2018-03-13 RX ORDER — PANTOPRAZOLE SODIUM 20 MG/1
40 TABLET, DELAYED RELEASE ORAL
Qty: 0 | Refills: 0 | Status: DISCONTINUED | OUTPATIENT
Start: 2018-03-13 | End: 2018-03-15

## 2018-03-13 RX ORDER — ASPIRIN/CALCIUM CARB/MAGNESIUM 324 MG
81 TABLET ORAL DAILY
Qty: 0 | Refills: 0 | Status: DISCONTINUED | OUTPATIENT
Start: 2018-03-13 | End: 2018-03-13

## 2018-03-13 RX ORDER — FUROSEMIDE 40 MG
20 TABLET ORAL DAILY
Qty: 0 | Refills: 0 | Status: DISCONTINUED | OUTPATIENT
Start: 2018-03-13 | End: 2018-03-13

## 2018-03-13 RX ORDER — ASPIRIN/CALCIUM CARB/MAGNESIUM 324 MG
81 TABLET ORAL ONCE
Qty: 0 | Refills: 0 | Status: COMPLETED | OUTPATIENT
Start: 2018-03-13 | End: 2018-03-13

## 2018-03-13 RX ORDER — CLOPIDOGREL BISULFATE 75 MG/1
75 TABLET, FILM COATED ORAL DAILY
Qty: 0 | Refills: 0 | Status: DISCONTINUED | OUTPATIENT
Start: 2018-03-13 | End: 2018-03-13

## 2018-03-13 RX ORDER — CLOPIDOGREL BISULFATE 75 MG/1
300 TABLET, FILM COATED ORAL ONCE
Qty: 0 | Refills: 0 | Status: COMPLETED | OUTPATIENT
Start: 2018-03-13 | End: 2018-03-13

## 2018-03-13 RX ORDER — METOPROLOL TARTRATE 50 MG
25 TABLET ORAL DAILY
Qty: 0 | Refills: 0 | Status: DISCONTINUED | OUTPATIENT
Start: 2018-03-13 | End: 2018-03-13

## 2018-03-13 RX ORDER — SODIUM CHLORIDE 9 MG/ML
3 INJECTION INTRAMUSCULAR; INTRAVENOUS; SUBCUTANEOUS EVERY 8 HOURS
Qty: 0 | Refills: 0 | Status: DISCONTINUED | OUTPATIENT
Start: 2018-03-13 | End: 2018-03-15

## 2018-03-13 RX ORDER — SODIUM CHLORIDE 9 MG/ML
1000 INJECTION, SOLUTION INTRAVENOUS
Qty: 0 | Refills: 0 | Status: DISCONTINUED | OUTPATIENT
Start: 2018-03-13 | End: 2018-03-14

## 2018-03-13 RX ORDER — DOPAMINE HYDROCHLORIDE 40 MG/ML
2.5 INJECTION, SOLUTION, CONCENTRATE INTRAVENOUS
Qty: 400 | Refills: 0 | Status: DISCONTINUED | OUTPATIENT
Start: 2018-03-13 | End: 2018-03-14

## 2018-03-13 RX ORDER — ALBUMIN HUMAN 25 %
250 VIAL (ML) INTRAVENOUS ONCE
Qty: 0 | Refills: 0 | Status: DISCONTINUED | OUTPATIENT
Start: 2018-03-13 | End: 2018-03-14

## 2018-03-13 RX ADMIN — SODIUM CHLORIDE 3 MILLILITER(S): 9 INJECTION INTRAMUSCULAR; INTRAVENOUS; SUBCUTANEOUS at 19:56

## 2018-03-13 RX ADMIN — Medication 81 MILLIGRAM(S): at 09:28

## 2018-03-13 RX ADMIN — CLOPIDOGREL BISULFATE 300 MILLIGRAM(S): 75 TABLET, FILM COATED ORAL at 11:01

## 2018-03-13 NOTE — DISCHARGE NOTE ADULT - NS AS ACTIVITY OBS
Walking-Indoors allowed/Stairs allowed/Showering allowed/Walking-Outdoors allowed/Do not drive or operate machinery/No Heavy lifting/straining

## 2018-03-13 NOTE — DISCHARGE NOTE ADULT - CARE PROVIDER_API CALL
Rowdy Zaman), Cardiology; Internal Medicine  [address] [place] [address]  Phone: [phone]  Fax: [phone]

## 2018-03-13 NOTE — CONSULT NOTE ADULT - PROBLEM SELECTOR RECOMMENDATION 9
-CTICU observation, no surgical intervention indicated at this time  -Formal repeat TTE to r/o pericardial effusion or tamponade  -Seriel cardiac enzymes & EKG  -Hold beta blocker (bradycardia)  -Antiplatelets when more stable

## 2018-03-13 NOTE — DISCHARGE NOTE ADULT - MEDICATION SUMMARY - MEDICATIONS TO STOP TAKING
I will STOP taking the medications listed below when I get home from the hospital:    furosemide 20 mg oral tablet  -- 1 tab(s) by mouth once a day    enalapril 2.5 mg oral tablet  -- 1 tab(s) by mouth once a day   -- Do not take this drug if you are pregnant.  It is very important that you take or use this exactly as directed.  Do not skip doses or discontinue unless directed by your doctor.  Some non-prescription drugs may aggravate your condition.  Read all labels carefully.  If a warning appears, check with your doctor before taking.

## 2018-03-13 NOTE — DISCHARGE NOTE ADULT - MEDICATION SUMMARY - MEDICATIONS TO CHANGE
I will SWITCH the dose or number of times a day I take the medications listed below when I get home from the hospital:    Toprol-XL 25 mg oral tablet, extended release  -- 25 tab(s) by mouth once a day   -- It is very important that you take or use this exactly as directed.  Do not skip doses or discontinue unless directed by your doctor.  May cause drowsiness.  Alcohol may intensify this effect.  Use care when operating dangerous machinery.  Some non-prescription drugs may aggravate your condition.  Read all labels carefully.  If a warning appears, check with your doctor before taking.  Swallow whole.  Do not crush.  Take with food or milk.  This drug may impair the ability to drive or operate machinery.  Use care until you become familiar with its effects.

## 2018-03-13 NOTE — DISCHARGE NOTE ADULT - CARE PLAN
Principal Discharge DX:	Coronary artery disease of native artery of native heart with stable angina pectoris  Goal:	Continue medications as ordered.  Assessment and plan of treatment:	Followup with the cardiologist within 1 week.  Also follow up with your primary care doctor within a week.  Take medications as ordered.    Follow a heart healthy diet as outlined above.  Secondary Diagnosis:	Hemopericardium  Goal:	Resolution of fluid around the heart  Assessment and plan of treatment:	Please call your doctor right away if you experience weakness, dizziness, shortness of breath, chest pain, nausea, vomiting or significant swelling of the belly, hands or legs.    If you are experiencing an emergency, call 911 to go to the nearest emergency room.

## 2018-03-13 NOTE — DISCHARGE NOTE ADULT - HOSPITAL COURSE
81 yo female who had an abnormal EKG in her PMD office and was sent to see cardiologist for follow up.  Patient sent to see Dr Zaman and had echocardiogram.  Echo showed Ef 40-45%, hypokinesis of apical anterior, apical septal, apical lateral and apical inferior walls and thinning and hypokineses and mid rula/septum with akinesis of the apex. Patient had a LHC on 2/3/2018 as listed below and is here today for coronary intervention of 100% LAD & 85% RCA.    LHC:   VENTRICLES: Analysis of regional contractile function demonstrated severe anterolateral hypokinesis and apical akinesis. Global left ventricular function was moderately depressed. EF estimated was 40 %.  CORONARY VESSELS: The coronary circulation is right dominant.  LM:     --  LM: Normal.  LAD:     --  Ostial LAD: There was a tubular 20 % stenosis.  --  Mid LAD: There was a tubular 100 % stenosis.  CX:     --  Proximal circumflex: There was a tubular 20 % stenosis.  RCA:     --  Ostial RCA: There was a discrete 85 % stenosis. There was a 25mmHg gradient across this stenosis.  --  Proximal RCA: Angiography showed mild atherosclerosis with no flow limiting lesions.  --  Mid RCA: Angiography showed minor luminal irregularities with no flow limiting lesions. 83 yo female who had an abnormal EKG in her PMD office and was sent to see cardiologist for follow up.  Patient sent to see Dr Zaman and had echocardiogram.  Echo showed Ef 40-45%, hypokinesis of apical anterior, apical septal, apical lateral and apical inferior walls and thinning and hypokineses and mid rula/septum with akinesis of the apex. Patient had a LHC on 2/3/2018 as listed below and is here today for coronary intervention of 100% LAD & 85% RCA.    LHC:   VENTRICLES: Analysis of regional contractile function demonstrated severe anterolateral hypokinesis and apical akinesis. Global left ventricular function was moderately depressed. EF estimated was 40 %.  CORONARY VESSELS: The coronary circulation is right dominant.  LM:     --  LM: Normal.  LAD:     --  Ostial LAD: There was a tubular 20 % stenosis.  --  Mid LAD: There was a tubular 100 % stenosis.  CX:     --  Proximal circumflex: There was a tubular 20 % stenosis.  RCA:     --  Ostial RCA: There was a discrete 85 % stenosis. There was a 25mmHg gradient across this stenosis.  --  Proximal RCA: Angiography showed mild atherosclerosis with no flow limiting lesions.  --  Mid RCA: Angiography showed minor luminal irregularities with no flow limiting lesions.     s/p LAD perforation during attempted PCI>hypotensive and bradycardic, dopamine started, currently stable and asymptomatic off Dopamine. Given her history of cirrhosis, she is not a candidate for CABG and plan is to proceed with medical therapy.     3/14 echo: small pericardial effusion, slightly less in size, no tamponade noted.    3/15 Bump in creatinine from 1 to 1.5; thrombocytopenia, down to 60 from 70. Chest xray shows small left effusion with pneumomediastinum.

## 2018-03-13 NOTE — PROGRESS NOTE ADULT - SUBJECTIVE AND OBJECTIVE BOX
Pre Cath Note    82y Female     Planned Procedure: PCI of LAD and RCA    Pertinent Prior Medications:        Antiplatelet: N/A       Aspirin: 81mg       Statin: Active liver disease       Beta Blocker: Toprol 25mg daily       CCB: N/A       Other Antianginal: N/A       ACEI: Enalapril 2.5mg daily    Pre-Procedural Orders: Give aspirin 81mg now    ASA: 2  Mallampati: 2  CCS Class: 3    HPI: 83 yo female who had an abnormal EKG in her PMD office and was sent to see cardiologist for follow up.  Patient sent to see Dr Zaman and had echocardiogram.  Echo showed Ef 40-45%, hypokinesis of apical anterior, apical septal, apical lateral and apical inferior walls and thinning and hypokineses and mid rula/septum with akinesis of the apex. Patient here today for coronary intervention of 100% LAD & 85% RCA.     LHC:   VENTRICLES: Analysis of regional contractile function demonstrated severe anterolateral hypokinesis and apical akinesis. Global left ventricular function was moderately depressed. EF estimated was 40 %.  CORONARY VESSELS: The coronary circulation is right dominant.  LM:     --  LM: Normal.  LAD:     --  Ostial LAD: There was a tubular 20 % stenosis.  --  Mid LAD: There was a tubular 100 % stenosis.  CX:     --  Proximal circumflex: There was a tubular 20 % stenosis.  RCA:     --  Ostial RCA: There was a discrete 85 % stenosis. There was a 25mmHg gradient across this stenosis.  --  Proximal RCA: Angiography showed mild atherosclerosis with no flow limiting lesions.  --  Mid RCA: Angiography showed minor luminal irregularities with no flow limiting lesions.    Echo: 1/29/2018       LVSF: Mildly to moderately reduced LVEF. Basal septal thickening without LVOT obstruction. Hypokinesis of the apical anterior, apical septal, apical lateral, and apical inferior walls, thinning and hypokinesis of the mid anteroseptum, and akinesis of the apex. Grade 1 LVDD.       EF: 40-45%       RVSF: Normal       LA: Normal       RA: Normal       Mitral Valve: Moderate posterior MAC with mild posterior directed MR       Aortic Valve: Moderately calcified with mild AS and trace AR.       Tricuspid Valve: Mild TR       Pulmonic Valve: Mild [place]       Pericardium: Epicardial fat pad    Allergies: No Known Allergies    PAST MEDICAL & SURGICAL HISTORY:  Vitamin D deficiency  Thrombocytopenia  Renal insufficiency  Portal hypertension  Non-alcoholic fatty liver disease  Hyperparathyroidism  HLD (hyperlipidemia)  Elevated TSH  HTN (hypertension)  Aortic stenosis  Cirrhosis of liver  NAFLD (nonalcoholic fatty liver disease)  White coat syndrome without diagnosis of hypertension  S/P coronary angiogram: feb 2018  S/P cataract surgery  S/P cholecystectomy  S/P ANURAG-BSO  History of appendectomy Pre Cath Note    82y Female     Planned Procedure: PCI of LAD and RCA    Pertinent Prior Medications:        Antiplatelet: N/A       Aspirin: 81mg       Statin: Active liver disease       Beta Blocker: Toprol 25mg daily       CCB: N/A       Other Antianginal: N/A       ACEI: Enalapril 2.5mg daily    Pre-Procedural Orders: Give aspirin 81mg and Plavix 300mg now    ASA: 2  Mallampati: 2  CCS Class: 3    HPI: 83 yo female who had an abnormal EKG in her PMD office and was sent to see cardiologist for follow up.  Patient sent to see Dr Zaman and had echocardiogram.  Echo showed Ef 40-45%, hypokinesis of apical anterior, apical septal, apical lateral and apical inferior walls and thinning and hypokineses and mid rula/septum with akinesis of the apex. Patient here today for coronary intervention of 100% LAD & 85% RCA.     LHC:   VENTRICLES: Analysis of regional contractile function demonstrated severe anterolateral hypokinesis and apical akinesis. Global left ventricular function was moderately depressed. EF estimated was 40 %.  CORONARY VESSELS: The coronary circulation is right dominant.  LM:     --  LM: Normal.  LAD:     --  Ostial LAD: There was a tubular 20 % stenosis.  --  Mid LAD: There was a tubular 100 % stenosis.  CX:     --  Proximal circumflex: There was a tubular 20 % stenosis.  RCA:     --  Ostial RCA: There was a discrete 85 % stenosis. There was a 25mmHg gradient across this stenosis.  --  Proximal RCA: Angiography showed mild atherosclerosis with no flow limiting lesions.  --  Mid RCA: Angiography showed minor luminal irregularities with no flow limiting lesions.    Echo: 1/29/2018       LVSF: Mildly to moderately reduced LVEF. Basal septal thickening without LVOT obstruction. Hypokinesis of the apical anterior, apical septal, apical lateral, and apical inferior walls, thinning and hypokinesis of the mid anteroseptum, and akinesis of the apex. Grade 1 LVDD.       EF: 40-45%       RVSF: Normal       LA: Normal       RA: Normal       Mitral Valve: Moderate posterior MAC with mild posterior directed MR       Aortic Valve: Moderately calcified with mild AS and trace AR.       Tricuspid Valve: Mild TR       Pulmonic Valve: Mild FL       Pericardium: Epicardial fat pad    Allergies: No Known Allergies    PAST MEDICAL & SURGICAL HISTORY:  Vitamin D deficiency  Thrombocytopenia  Renal insufficiency  Portal hypertension  Non-alcoholic fatty liver disease  Hyperparathyroidism  HLD (hyperlipidemia)  Elevated TSH  HTN (hypertension)  Aortic stenosis  Cirrhosis of liver  NAFLD (nonalcoholic fatty liver disease)  White coat syndrome without diagnosis of hypertension  S/P coronary angiogram: feb 2018  S/P cataract surgery  S/P cholecystectomy  S/P ANURAG-BSO  History of appendectomy

## 2018-03-13 NOTE — PROGRESS NOTE ADULT - SUBJECTIVE AND OBJECTIVE BOX
Department of Cardiology                                                                  [facility]/[place] [address] [place] [address]                                                            Telephone: [phone]. Fax:[phone]                                                                           Cardiac Catheterization Note       Subjective:  82y  Female who had a left heart catheterization which showed:      PAST MEDICAL & SURGICAL HISTORY:  Vitamin D deficiency  Thrombocytopenia  Renal insufficiency  Portal hypertension  Non-alcoholic fatty liver disease  Hyperparathyroidism  HLD (hyperlipidemia)  Elevated TSH  HTN (hypertension)  Aortic stenosis  Cirrhosis of liver  NAFLD (nonalcoholic fatty liver disease)  White coat syndrome without diagnosis of hypertension  S/P coronary angiogram: feb 2018  S/P cataract surgery  S/P cholecystectomy  S/P ANURAG-BSO  History of appendectomy    Home Medications:  aspirin 81 mg oral tablet, chewable: 1 tab(s) orally once a day (13 Mar 2018 08:43)  furosemide 20 mg oral tablet: 1 tab(s) orally once a day (13 Mar 2018 08:43)    HPI: 81 yo female who had an abnormal EKG in her PMD office and was sent to see cardiologist for follow up.  Patient sent to see Dr Zaman and had echocardiogram.  Echo showed Ef 40-45%, hypokinesis of apical anterior, apical septal, apical lateral and apical inferior walls and thinning and hypokineses and mid rula/septum with akinesis of the apex. Patient here today for coronary intervention of 100% LAD & 85% RCA.     General: No fatigue, no fevers/chills  Respiratory: No dyspnea, no cough, no wheeze  CV: No chest pain, no palpitations  Abd: No nausea  Neuro: No headache, no dizziness    No Known Allergies    Objective:  Vital Signs Last 24 Hrs  HR: 54 (13 Mar 2018 13:56) (50 - 66)  BP: 104/51 (13 Mar 2018 13:56) (104/51 - 116/54)  RR: 16 (13 Mar 2018 13:56) (16 - 16)  SpO2: 97% (13 Mar 2018 13:56) (97% - 99%)    CM: SR  Neuro: A&OX3, CN 2-12 intact  HEENT: NC, AT  Lungs: CTA B/L  CV: S1, S2, no murmur, RRR  Abd: Soft  Right Groin: Soft, no bleeding, no hematoma, small ecchymotic area  Left Groin:   Extremity: + distal pulses  EKG: NSR, LAD, ? LAFB, Septal infarct, ST depressions in II, III, aVF, inverted T waves in precordial leads. Department of Cardiology                                                                  [facility]/[place] [address] [address] [facility]                                                            Telephone: [phone]. Fax:[phone]                                                                           Cardiac Catheterization Note       Subjective:  82y  Female who had a left heart catheterization on 2/3/2018 which showed:  VENTRICLES: Analysis of regional contractile function demonstrated severe anterolateral hypokinesis and apical akinesis. Global left ventricular function was moderately depressed. EF estimated was 40 %.  CORONARY VESSELS: The coronary circulation is right dominant.  LM:     --  LM: Normal.  LAD:     --  Ostial LAD: There was a tubular 20 % stenosis.  --  Mid LAD: There was a tubular 100 % stenosis.  CX:     --  Proximal circumflex: There was a tubular 20 % stenosis.  RCA:     --  Ostial RCA: There was a discrete 85 % stenosis. There was a 25mmHg gradient across this stenosis.  --  Proximal RCA: Angiography showed mild atherosclerosis with no flow limiting lesions.  --  Mid RCA: Angiography showed minor luminal irregularities with no flow limiting lesions.    During an attempted intervention on the LAD, the artery was perforated , resulting in a small pericardial effusion.    PAST MEDICAL & SURGICAL HISTORY:  Vitamin D deficiency  Thrombocytopenia  Renal insufficiency  Portal hypertension  Non-alcoholic fatty liver disease  Hyperparathyroidism  HLD (hyperlipidemia)  Elevated TSH  HTN (hypertension)  Aortic stenosis  Cirrhosis of liver  NAFLD (nonalcoholic fatty liver disease)  White coat syndrome without diagnosis of hypertension  S/P coronary angiogram: feb 2018  S/P cataract surgery  S/P cholecystectomy  S/P ANURAG-BSO  History of appendectomy    Home Medications:  aspirin 81 mg oral tablet, chewable: 1 tab(s) orally once a day (13 Mar 2018 08:43)  furosemide 20 mg oral tablet: 1 tab(s) orally once a day (13 Mar 2018 08:43)  Toprol 25mg daily  Enalapril 2.5mg daily    HPI: 81 yo female who had an abnormal EKG in her PMD office and was sent to see cardiologist for follow up.  Patient sent to see Dr Zaman and had echocardiogram.  Echo showed Ef 40-45%, hypokinesis of apical anterior, apical septal, apical lateral and apical inferior walls and thinning and hypokineses and mid rula/septum with akinesis of the apex. Patient here today for coronary intervention of 100% LAD & 85% RCA.     General: No fatigue, no fevers/chills  Respiratory: No dyspnea, no cough, no wheeze  CV: No chest pain, no palpitations  Abd: No nausea  Neuro: No headache, no dizziness    No Known Allergies    Objective:  Vital Signs Last 24 Hrs  HR: 54 (13 Mar 2018 13:56) (50 - 66)  BP: 104/51 (13 Mar 2018 13:56) (104/51 - 116/54)  RR: 16 (13 Mar 2018 13:56) (16 - 16)  SpO2: 97% (13 Mar 2018 13:56) (97% - 99%)    CM: SR  Neuro: A&OX3, CN 2-12 intact  HEENT: NC, AT  Lungs: CTA B/L  CV: S1, S2, no murmur, RRR  Abd: Soft  Right Groin: Soft, no bleeding, no hematoma, small ecchymotic area  Left Groin: 6fr. arterial sheath, no bleeding, no hematoma  Extremity: + distal pulses  EKG: NSR, LAD, ? LAFB, Septal infarct, ST depressions in II, III, aVF, inverted T waves in precordial leads.

## 2018-03-13 NOTE — PROGRESS NOTE ADULT - ASSESSMENT
Diagnosis: CAD, coronary (LAD) perforation  Procedure: Attempted PCI    Problem List:   1. Coronary (LAD) perforation  2. CAD of LAD () and RCA    Plan:   1. CV: Cardiac monitoring    2. Respiratory: SaO2 monitoring    3. Medications:        DAPT: None secondary to perforation       Statin: None secondary to liver disease       Beta Blocker: Held secondary to hypotension       ACEI: Held secondary to hypotension       CCB: N/A       A/C: N/A       Other Medications:        Drips: Dopamine at 2.5 mcg/kg/min.    4. Diagnostics: Echo in AM    5. Management as per: Dr. Trejo Diagnosis: CAD, coronary (LAD) perforation  Procedure: Attempted PCI    Problem List:   1. Coronary (LAD) perforation  2. CAD of LAD () and RCA    Plan:   1. CV: Cardiac monitoring, left femoral arterial sheath transduced until AM. Hold all antihypertensives.    2. Respiratory: SaO2 monitoring    3. Medications:        DAPT: None secondary to perforation       Statin: None secondary to liver disease       Beta Blocker: Held secondary to hypotension       ACEI: Held secondary to hypotension       CCB: N/A       A/C: N/A       Other Medications:        Drips: Dopamine at 2.5 mcg/kg/min.    4. Diagnostics: Echo in AM, CBC, BMP, Mg, and lipids in AM    5. Management as per: Dr. Trejo

## 2018-03-13 NOTE — DISCHARGE NOTE ADULT - MEDICATION SUMMARY - MEDICATIONS TO TAKE
I will START or STAY ON the medications listed below when I get home from the hospital:    aspirin 81 mg oral delayed release tablet  -- 1 tab(s) by mouth once a day  -- Indication: For CAD    Metoprolol Succinate ER 25 mg oral tablet, extended release  -- 0.5 tab(s) by mouth once a day   -- Indication: For Hypertension

## 2018-03-13 NOTE — CONSULT NOTE ADULT - SUBJECTIVE AND OBJECTIVE BOX
82F with PMH of Chronic Systolic CHF (reported EF ~ 40%), CAD, Mild AS, HTN, HLD, Non-alcoholic Fatty Liver Disease, Cirrhosis (Child's Class B to C, followed by GI: Margarito), Portal HTN, Thrombocytopenia, Chronic Renal Insufficiency presented today for elective PCI of known CAD (mLAD 100% & oRCA 85%). During the the procedure the LAD was perforated that resulted in mild hypotension and bradycardia requiring the addition of a Dopamine drip with prompt improvement in hemodynamics. Multiple balloon inflations at the site of perforation were able to tamponade the bleeding without a stent being placed. No EKG changes were noted at any point during the procedure. TTE performed on the cath table demonstrated only a small pericardial effusion. Eventually the Dopmine drip was able to be weaned off and patient transported to CTICU for observation.    PMH  Chronic systolic CHF  Coronary artery disease  Aortic stenosis  Hypertension  Hyperlipidemia  Non-alcoholic fatty liver disease  Cirrhosis (Child's class B to C)  Portal hypertension  Thrombocytopenia  Renal insufficiency  Hyperparathyroidism  Vitamin D deficiency    PSH  Cholecystectomy  Appendectomy  Hysterectomy  Cataract surgery    FAMILY HISTORY  Father:  at 46 of lung cancer & Mother:  at 64 from PE  No family history of cardiac disease    SOCIAL HISTORY:  Smoker: Former smoker, quit in   ETOH use: Denies  Ilicit Drug use: Denies  Occupation: Retired    MEDICATIONS  (STANDING):  None    MEDICATIONS  (PRN):  None    Antiplatelet therapy: Received Plavix load (300mg) prior to start if PCI    Allergies: NKDA                                                          LABS: Pending    Cardiac Cath: 3/13 mLAD 100%, oRCA 85%, pCx 20%    TTE: Formal repeat TTE pending      Review of Systems  Constitutional: negative  HEENT: negative    Respiratory: denies SOB, CHAVEZ  Cardiovascular: denies CP, palpitations, diaporesis  Gastrointestinal: denies  Genitourinary: denies  Skin/Breast: denies  Musculoskeletal: denies  Neurologic: denies  Psychiatric: denies  Endocrine: denies  Hematology/Oncology: denies       HR: 75 (18 @ 14:45) (48 - 75)  BP: 142/63 (18 @ 14:45) (93/49 - 142/63)  ABP: 139/55 (18 @ 14:45) (100/37 - 139/55)  RR: 16 (18 @ 14:45) (16 - 16)  SpO2: 97% (18 @ 14:45) (97% - 99%)      Physical Exam  General: well developed, well nourished, no distress                                                 Neuro: A+O x 3, non-focal, speech clear and intact                     Eyes: EOMI   ENT: Normal exam of oral mucosa with absence of cyanosis  Neck: supple, no JVD  Chest: CTA, equal bilaterally, no wheezes, rales or rhonchi  CV: RRR, +S1S2, no murmurs or rubs  GI: soft, NT, ND, +BS  Extremities: SHARP x 4, pedal edema noted in left ankle  SKIN: warm, dry, intact

## 2018-03-13 NOTE — DISCHARGE NOTE ADULT - ADDITIONAL INSTRUCTIONS
No heavy lifting, driving, sex, tub baths, swimming, or any activity that submerges the lower half of the body in water for 48 hours.  Limited walking and stairs for 48 hours.    Change the bandaid after 24 hours and every 24 hours after that.  Keep the puncture site dry and covered with a bandaid until a scab forms.    Observe the site frequently.  If bleeding or a large lump (the size of a golf ball or bigger) occurs lie flat, apply continuous direct pressure just above the puncture site for at least 10 minutes, and notify your physician immediately.  If the bleeding cannot be controlled, call 911 immediately for assistance.  Notify your physician of pain, swelling or any drainage.    Notify your physician immediately if coldness, numbness, discoloration or pain in your foot occurs.  Contact your physician before discontinuing any medications.  The American College of Cardiology provides a website which provides information and tools to help manage your disease.  It can be found at https://www.cardiosmart.org Keep the puncture site dry and covered with a bandaid until a scab forms.    Observe the site frequently.  If bleeding or a large lump (the size of a golf ball or bigger) occurs lie flat, apply continuous direct pressure just above the puncture site for at least 10 minutes, and notify your physician immediately.  If the bleeding cannot be controlled, call 911 immediately for assistance.  Notify your physician of pain, swelling or any drainage.    Notify your physician immediately if coldness, numbness, discoloration or pain in your foot occurs.  Contact your physician before discontinuing any medications.  The American College of Cardiology provides a website which provides information and tools to help manage your disease.  It can be found at https://www.cardiosmart.org

## 2018-03-13 NOTE — CONSULT NOTE ADULT - ATTENDING COMMENTS
H& P reviewed and confirmed. Physical findings as above. Will admit her to CT ICU for observation for any signs of pericardial effusion/bleeding.

## 2018-03-13 NOTE — DISCHARGE NOTE ADULT - PATIENT PORTAL LINK FT
You can access the [facility] Patient Portal, offered by [facility], by registering with the following website: [email or web address]

## 2018-03-13 NOTE — DISCHARGE NOTE ADULT - PLAN OF CARE
Continue medications as ordered. Followup with the cardiologist within 1 week.  Also follow up with your primary care doctor within a week.  Take medications as ordered.    Follow a heart healthy diet as outlined above. Resolution of fluid around the heart Please call your doctor right away if you experience weakness, dizziness, shortness of breath, chest pain, nausea, vomiting or significant swelling of the belly, hands or legs.    If you are experiencing an emergency, call 911 to go to the nearest emergency room.

## 2018-03-14 LAB
ANION GAP SERPL CALC-SCNC: 16 MMOL/L — SIGNIFICANT CHANGE UP (ref 5–17)
ANISOCYTOSIS BLD QL: SLIGHT — SIGNIFICANT CHANGE UP
BASOPHILS NFR BLD AUTO: 1 % — SIGNIFICANT CHANGE UP (ref 0–2)
BUN SERPL-MCNC: 34 MG/DL — HIGH (ref 8–20)
CALCIUM SERPL-MCNC: 8.3 MG/DL — LOW (ref 8.6–10.2)
CHLORIDE SERPL-SCNC: 107 MMOL/L — SIGNIFICANT CHANGE UP (ref 98–107)
CHOLEST SERPL-MCNC: 127 MG/DL — SIGNIFICANT CHANGE UP (ref 110–199)
CO2 SERPL-SCNC: 18 MMOL/L — LOW (ref 22–29)
CREAT SERPL-MCNC: 1.06 MG/DL — SIGNIFICANT CHANGE UP (ref 0.5–1.3)
GLUCOSE SERPL-MCNC: 119 MG/DL — HIGH (ref 70–115)
HCT VFR BLD CALC: 29.4 % — LOW (ref 37–47)
HCT VFR BLD CALC: 31.2 % — LOW (ref 37–47)
HCT VFR BLD CALC: 33.6 % — LOW (ref 37–47)
HDLC SERPL-MCNC: 54 MG/DL — LOW
HGB BLD-MCNC: 10.3 G/DL — LOW (ref 12–16)
HGB BLD-MCNC: 10.9 G/DL — LOW (ref 12–16)
HGB BLD-MCNC: 9.8 G/DL — LOW (ref 12–16)
HYPOCHROMIA BLD QL: SLIGHT — SIGNIFICANT CHANGE UP
LIPID PNL WITH DIRECT LDL SERPL: 59 MG/DL — SIGNIFICANT CHANGE UP
LYMPHOCYTES # BLD AUTO: 6 % — LOW (ref 20–55)
MACROCYTES BLD QL: SLIGHT — SIGNIFICANT CHANGE UP
MAGNESIUM SERPL-MCNC: 1.8 MG/DL — SIGNIFICANT CHANGE UP (ref 1.6–2.6)
MCHC RBC-ENTMCNC: 30.7 PG — SIGNIFICANT CHANGE UP (ref 27–31)
MCHC RBC-ENTMCNC: 31.1 PG — HIGH (ref 27–31)
MCHC RBC-ENTMCNC: 31.3 PG — HIGH (ref 27–31)
MCHC RBC-ENTMCNC: 32.4 G/DL — SIGNIFICANT CHANGE UP (ref 32–36)
MCHC RBC-ENTMCNC: 33 G/DL — SIGNIFICANT CHANGE UP (ref 32–36)
MCHC RBC-ENTMCNC: 33.3 G/DL — SIGNIFICANT CHANGE UP (ref 32–36)
MCV RBC AUTO: 93.3 FL — SIGNIFICANT CHANGE UP (ref 81–99)
MCV RBC AUTO: 94.6 FL — SIGNIFICANT CHANGE UP (ref 81–99)
MCV RBC AUTO: 94.8 FL — SIGNIFICANT CHANGE UP (ref 81–99)
MONOCYTES NFR BLD AUTO: 5 % — SIGNIFICANT CHANGE UP (ref 3–10)
NEUTROPHILS NFR BLD AUTO: 87 % — HIGH (ref 37–73)
NEUTS BAND # BLD: 1 % — SIGNIFICANT CHANGE UP (ref 0–8)
PLAT MORPH BLD: NORMAL — SIGNIFICANT CHANGE UP
PLATELET # BLD AUTO: 66 K/UL — LOW (ref 150–400)
PLATELET # BLD AUTO: 71 K/UL — LOW (ref 150–400)
PLATELET # BLD AUTO: 81 K/UL — LOW (ref 150–400)
POTASSIUM SERPL-MCNC: 4 MMOL/L — SIGNIFICANT CHANGE UP (ref 3.5–5.3)
POTASSIUM SERPL-SCNC: 4 MMOL/L — SIGNIFICANT CHANGE UP (ref 3.5–5.3)
RBC # BLD: 3.15 M/UL — LOW (ref 4.4–5.2)
RBC # BLD: 3.29 M/UL — LOW (ref 4.4–5.2)
RBC # BLD: 3.55 M/UL — LOW (ref 4.4–5.2)
RBC # FLD: 14.4 % — SIGNIFICANT CHANGE UP (ref 11–15.6)
RBC # FLD: 14.4 % — SIGNIFICANT CHANGE UP (ref 11–15.6)
RBC # FLD: 14.5 % — SIGNIFICANT CHANGE UP (ref 11–15.6)
RBC BLD AUTO: ABNORMAL
SODIUM SERPL-SCNC: 141 MMOL/L — SIGNIFICANT CHANGE UP (ref 135–145)
TOTAL CHOLESTEROL/HDL RATIO MEASUREMENT: 2 RATIO — LOW (ref 3.3–7.1)
TRIGL SERPL-MCNC: 69 MG/DL — SIGNIFICANT CHANGE UP (ref 10–200)
WBC # BLD: 4.4 K/UL — LOW (ref 4.8–10.8)
WBC # BLD: 4.4 K/UL — LOW (ref 4.8–10.8)
WBC # BLD: 5.6 K/UL — SIGNIFICANT CHANGE UP (ref 4.8–10.8)
WBC # FLD AUTO: 4.4 K/UL — LOW (ref 4.8–10.8)
WBC # FLD AUTO: 4.4 K/UL — LOW (ref 4.8–10.8)
WBC # FLD AUTO: 5.6 K/UL — SIGNIFICANT CHANGE UP (ref 4.8–10.8)

## 2018-03-14 PROCEDURE — 36620 INSERTION CATHETER ARTERY: CPT

## 2018-03-14 PROCEDURE — 93010 ELECTROCARDIOGRAM REPORT: CPT

## 2018-03-14 PROCEDURE — 93308 TTE F-UP OR LMTD: CPT | Mod: 26

## 2018-03-14 RX ORDER — ACETAMINOPHEN 500 MG
1000 TABLET ORAL ONCE
Qty: 0 | Refills: 0 | Status: DISCONTINUED | OUTPATIENT
Start: 2018-03-14 | End: 2018-03-14

## 2018-03-14 RX ADMIN — SODIUM CHLORIDE 3 MILLILITER(S): 9 INJECTION INTRAMUSCULAR; INTRAVENOUS; SUBCUTANEOUS at 11:53

## 2018-03-14 RX ADMIN — SODIUM CHLORIDE 3 MILLILITER(S): 9 INJECTION INTRAMUSCULAR; INTRAVENOUS; SUBCUTANEOUS at 21:05

## 2018-03-14 RX ADMIN — SODIUM CHLORIDE 3 MILLILITER(S): 9 INJECTION INTRAMUSCULAR; INTRAVENOUS; SUBCUTANEOUS at 05:14

## 2018-03-14 RX ADMIN — SODIUM CHLORIDE 75 MILLILITER(S): 9 INJECTION, SOLUTION INTRAVENOUS at 11:24

## 2018-03-14 RX ADMIN — PANTOPRAZOLE SODIUM 40 MILLIGRAM(S): 20 TABLET, DELAYED RELEASE ORAL at 11:24

## 2018-03-14 NOTE — PROCEDURE NOTE - NSPROCDETAILS_GEN_ALL_CORE
location identified, draped/prepped, sterile technique used, needle inserted/introduced/connected to a pressurized flush line/sutured in place/Seldinger technique/positive blood return obtained via catheter/all materials/supplies accounted for at end of procedure

## 2018-03-14 NOTE — PROGRESS NOTE ADULT - SUBJECTIVE AND OBJECTIVE BOX
Narrative :  82F with PMH of Chronic Systolic CHF (reported EF ~ 40%), CAD, Mild AS, HTN, HLD, Non-alcoholic Fatty Liver Disease, Cirrhosis (Child's Class B to C, followed by GI: Margarito), Portal HTN, Thrombocytopenia, Chronic Renal Insufficiency presented today for elective PCI of known CAD (mLAD 100% & oRCA 85%). During the the procedure the LAD was perforated that resulted in mild hypotension and bradycardia requiring the addition of a Dopamine drip with prompt improvement in hemodynamics. Multiple balloon inflations at the site of perforation were able to tamponade the bleeding without a stent being placed. No EKG changes were noted at any point during the procedure. TTE performed on the cath table demonstrated only a small pericardial effusion. Eventually the Dopmine drip was able to be weaned off and patient in CTICU for observation.    Received patient in bed with left groin de-sheathing by CTICU PA  Left groin benign, right groin ecchymotic, no hematoma no bleeding no bruit noted  Cardiac cath  CORONARY VESSELS: The coronary circulation is right dominant.  LM:   --  LM: Normal.  LAD:   --  Proximal LAD: There was a tubular 30 % stenosis.  --  Mid LAD: There was a 100 % stenosis.  CX:   --  Circumflex: Angiography showed mild atherosclerosis with no flow  limiting lesions.  RCA:   --  RCA: This vessel was not injected.  COMPLICATIONS: After the wire was extended across the stenosis along with  the guiding catheter, angiography shows dye extravasation. It was NOT free  flowing in the pericardial space. But there was significant dye staining.  Contralateral injection showed the wire not to be intraluminal. Heparin  was reversed with Protamine. Multiple prolonged balloon inflations were  done more proximal than the site of extravasation. STAT echo showed mild  pericardial fluid with no evidence of tamponade. At the conclusion of the  procedure there was no flow and no further dye extravasation.   Echo pericardial fluid less then prior study but official reading pending.    Patient pain free at this time right radial a line in place  VSS afebrile

## 2018-03-14 NOTE — CHART NOTE - NSCHARTNOTEFT_GEN_A_CORE
CTS PA Note:    Left femoral artery sheath removal approximately 8:30AM.  Pulses in the left lower extremity were palpable pre sheath removal.   The patient was placed in the supine position. The insertion site was identified and the sutures were removed.  The sheath was then removed.  Direct pressure was applied for 25 minutes.  A sandbag was applied and is to remain in place for three hours.    Monitoring of the right groin and both lower extremities including neuro-vascular checks and vital signs every 15 minutes x4 then every 30 minutes x2, then every 1 hour x 3 was ordered.      Complications: NONE    groin check ~1400 no hematoma of L groin, soft, + palpable pedal pulses.  pt placed OOB to chair.

## 2018-03-14 NOTE — PROCEDURE NOTE - NSTIMEOUT_GEN_A_CORE
----- Message from Cornelius Ann MD sent at 12/12/2017  2:30 PM CST -----  Dr Chema Williamson has cleared her for anticoagulation.  Please start her on xarelto 15 mg QD. Give her a Rx savings card please.  thanks  Demond    ----- Message -----  From: GAGE Bear MD  Sent: 12/12/2017   8:29 AM  To: Cornelius Ann MD, #    OK to start anticoagulation from my standpoint.      
Patient's first and last name, , procedure, and correct site confirmed prior to the start of procedure.

## 2018-03-14 NOTE — PROGRESS NOTE ADULT - SUBJECTIVE AND OBJECTIVE BOX
INTERVAL HISTORY:  S/P attempt at PCI of a chronically occluded LAD which was complicated by perforation  Small pericardial effusion which by today's echo has decreased in size  NO need for pericardial drain or window  Off all pressors  Had platelet transfusion    	  MEDICATIONS:          pantoprazole    Tablet 40 milliGRAM(s) Oral before breakfast            PHYSICAL EXAM:    T(C): 37 (03-14-18 @ 15:57), Max: 37.3 (03-14-18 @ 13:00)  HR: 80 (03-14-18 @ 15:57) (50 - 83)  BP: 113/60 (03-14-18 @ 15:57) (113/60 - 122/57)  RR: 21 (03-14-18 @ 15:57) (17 - 32)  SpO2: 99% (03-14-18 @ 15:57) (94% - 99%)  Wt(kg): --    I&O's Summary    13 Mar 2018 07:01  -  14 Mar 2018 07:00  --------------------------------------------------------  IN: 1404.4 mL / OUT: 300 mL / NET: 1104.4 mL    14 Mar 2018 07:01  -  14 Mar 2018 20:16  --------------------------------------------------------  IN: 1352 mL / OUT: 1000 mL / NET: 352 mL        Daily     Daily     Appearance: Normal	  HEENT:   Normal oral mucosa, PERRL, EOMI	  Lymphatic: No lymphadenopathy  Cardiovascular: Normal S1 S2, No JVD, No murmurs, No edema  Respiratory: Lungs clear to auscultation	  Psychiatry: A & O x 3, Mood & affect appropriate  Gastrointestinal:  Soft, Non-tender, + BS	  Skin: No rashes, No ecchymoses, No cyanosis  Neurologic: Non-focal  Extremities: Normal range of motion, No clubbing, cyanosis or edema  Vascular: Peripheral pulses palpable 2+ bilaterally                            9.8    5.6   )-----------( 71       ( 14 Mar 2018 09:42 )             29.4     03-14    141  |  107  |  34.0<H>  ----------------------------<  119<H>  4.0   |  18.0<L>  |  1.06    Ca    8.3<L>      14 Mar 2018 04:42  Mg     1.8     03-14      proBNP:   Lipid Profile:   HgA1c:     ASSESSMENT/PLAN: 	  S/P attempt at PCI of a chronically occluded LAD which was complicated by perforation  Small pericardial effusion which by today's echo has decreased in size  Restart ASA tomorrow  Anticipate discharge tomorrow.

## 2018-03-14 NOTE — PROGRESS NOTE ADULT - SUBJECTIVE AND OBJECTIVE BOX
Narrative :  82F with PMH of Chronic Systolic CHF (reported EF ~ 40%), CAD, Mild AS, HTN, HLD, Non-alcoholic Fatty Liver Disease, Cirrhosis (Child's Class B to C, followed by GI: Margarito), Portal HTN, Thrombocytopenia, Chronic Renal Insufficiency presented today for elective PCI of known CAD (mLAD 100% & oRCA 85%). During the the procedure the LAD was perforated that resulted in mild hypotension and bradycardia requiring the addition of a Dopamine drip with prompt improvement in hemodynamics. Multiple balloon inflations at the site of perforation were able to tamponade the bleeding without a stent being placed. No EKG changes were noted at any point during the procedure. TTE performed on the cath table demonstrated only a small pericardial effusion. Eventually the Dopmine drip was able to be weaned off and patient transported to CTICU for observation  pt with sinus chavez on tele. Pt with related hypotension, pt denies chest pain or sob on exam   urgent ECHO @ 10 pm  3/13 , as per Cards read ( over phone ) with same pericardial effusion size but pre tamponade physiology. D/W Dr Banks.       Vital Signs Last 24 Hrs  T(C): 36.6 (13 Mar 2018 16:00), Max: 36.6 (13 Mar 2018 16:00)  T(F): 97.8 (13 Mar 2018 16:00), Max: 97.8 (13 Mar 2018 16:00)  HR: 52 (14 Mar 2018 02:00) (48 - 75)  BP: 105/52 (13 Mar 2018 20:00) (93/49 - 142/63)  BP(mean): 75 (13 Mar 2018 20:00) (74 - 88)  RR: 17 (14 Mar 2018 02:00) (14 - 32)  SpO2: 97% (14 Mar 2018 02:00) (96% - 100%)  I&O's Detail    13 Mar 2018 07:01  -  14 Mar 2018 02:59  --------------------------------------------------------  IN:    Albumin 5%  - 250 mL: 250 mL    DOPamine Infusion: 30.9 mL    lactated ringers.: 225 mL    Oral Fluid: 240 mL  Total IN: 745.9 mL    OUT:  Total OUT: 0 mL    Total NET: 745.9 mL      I&O's Summary    13 Mar 2018 07:01  -  14 Mar 2018 02:59  --------------------------------------------------------  IN: 745.9 mL / OUT: 0 mL / NET: 745.9 mL        LABS:  CBC Full  -  ( 14 Mar 2018 00:25 )  WBC Count : 4.4 K/uL  Hemoglobin : 10.9 g/dL  Hematocrit : 33.6 %  Platelet Count - Automated : 81 K/uL  Mean Cell Volume : 94.6 fl  Mean Cell Hemoglobin : 30.7 pg  Mean Cell Hemoglobin Concentration : 32.4 g/dL  Auto Neutrophil # : x  Auto Lymphocyte # : x  Auto Monocyte # : x  Auto Eosinophil # : x  Auto Basophil # : x  Auto Neutrophil % : x  Auto Lymphocyte % : x  Auto Monocyte % : x  Auto Eosinophil % : x  Auto Basophil % : x    03-13    142  |  107  |  24.0<H>  ----------------------------<  133<H>  4.1   |  21.0<L>  |  0.88    Ca    8.2<L>      13 Mar 2018 16:14  Mg     1.9     03-13          Daily     Daily     MEDICATIONS  (STANDING):  albumin human  5% IVPB 250 milliLiter(s) IV Intermittent once  DOPamine Infusion 2.5 MICROgram(s)/kG/Min (6.676 mL/Hr) IV Continuous <Continuous>  lactated ringers. 1000 milliLiter(s) (75 mL/Hr) IV Continuous <Continuous>  pantoprazole    Tablet 40 milliGRAM(s) Oral before breakfast  sodium chloride 0.9% lock flush 3 milliLiter(s) IV Push every 8 hours    MEDICATIONS  (PRN):      General: A+Ox3, in NAD  Chest: sternal dressing C/D/I  Heart: S1, S2, RRR  Lungs: CTA B/L, without W/R/R  Abdomen: Soft, ND, NT, positive BS  Extremeties: without edema B/L LE, positive DP pulses B/L   right groin , soft + pulses     Does the patient have a history of CHF: yes   -If yes, systolic or diastolic: systolic   -pre-operative EF: 40    Extubation within 24 hours: none     Does the patient have a history of kidney disease: no   -give CKD stage if applicable:  -what is patient's baseline Creatinine:  0.88     Beta Blockers contraindicated for the first 24 hours due to vasopressor/inotrpic medication: Yes  -If on pressors, indication:  pt is on dopamine gtt    Did the patient receive transfusion of blood and/or products: none  -If yes, indication:    DVT PPX:  pas stocking     Jodi-operative antibiotics discontinued within 48 hours of CTU admission:  none  -name/date/time of discontinue    RADIOLOGY & ADDITIONAL TESTS:     82 yr female s/p  LAD perforation , post procedure pericardial effusion .  hypotension.     pt with sinus chavez on tele. Pt with related hypotension, pt denies chest pain or sob on exam   urgent ECHO @ 10 pm  3/13 , as per Cards read ( over phone ) with same pericardial effusion size but pre tamponade physiology. D/W Dr Banks.     echo in am       Patient care discussed on morning interdisciplinary rounds with CTS team.

## 2018-03-14 NOTE — PROGRESS NOTE ADULT - ASSESSMENT
82F with PMH of Chronic Systolic CHF (reported EF ~ 40%), CAD, Mild AS, HTN, HLD, Non-alcoholic Fatty Liver Disease, Cirrhosis (Child's Class B to C, followed by GI: Margarito), Portal HTN, Thrombocytopenia, Chronic Renal Insufficiency s/p LAD perforation during attempted PCI, currently stable and asymptomatic off Dopamine. Given her history of cirrhosis, she is not a candidate for CABG and plan is to proceed with medical therapy. Case and plan discussed with Dr. Banks.

## 2018-03-15 VITALS
RESPIRATION RATE: 18 BRPM | OXYGEN SATURATION: 97 % | HEART RATE: 80 BPM | SYSTOLIC BLOOD PRESSURE: 118 MMHG | DIASTOLIC BLOOD PRESSURE: 60 MMHG | TEMPERATURE: 98 F

## 2018-03-15 DIAGNOSIS — E87.6 HYPOKALEMIA: ICD-10-CM

## 2018-03-15 DIAGNOSIS — N17.9 ACUTE KIDNEY FAILURE, UNSPECIFIED: ICD-10-CM

## 2018-03-15 DIAGNOSIS — I31.2 HEMOPERICARDIUM, NOT ELSEWHERE CLASSIFIED: ICD-10-CM

## 2018-03-15 LAB
ANION GAP SERPL CALC-SCNC: 11 MMOL/L — SIGNIFICANT CHANGE UP (ref 5–17)
ANION GAP SERPL CALC-SCNC: 15 MMOL/L — SIGNIFICANT CHANGE UP (ref 5–17)
BUN SERPL-MCNC: 45 MG/DL — HIGH (ref 8–20)
BUN SERPL-MCNC: 46 MG/DL — HIGH (ref 8–20)
CALCIUM SERPL-MCNC: 8.7 MG/DL — SIGNIFICANT CHANGE UP (ref 8.6–10.2)
CALCIUM SERPL-MCNC: 8.9 MG/DL — SIGNIFICANT CHANGE UP (ref 8.6–10.2)
CHLORIDE SERPL-SCNC: 101 MMOL/L — SIGNIFICANT CHANGE UP (ref 98–107)
CHLORIDE SERPL-SCNC: 104 MMOL/L — SIGNIFICANT CHANGE UP (ref 98–107)
CO2 SERPL-SCNC: 21 MMOL/L — LOW (ref 22–29)
CO2 SERPL-SCNC: 24 MMOL/L — SIGNIFICANT CHANGE UP (ref 22–29)
CREAT SERPL-MCNC: 1.34 MG/DL — HIGH (ref 0.5–1.3)
CREAT SERPL-MCNC: 1.51 MG/DL — HIGH (ref 0.5–1.3)
GLUCOSE SERPL-MCNC: 109 MG/DL — SIGNIFICANT CHANGE UP (ref 70–115)
GLUCOSE SERPL-MCNC: 132 MG/DL — HIGH (ref 70–115)
HCT VFR BLD CALC: 29 % — LOW (ref 37–47)
HGB BLD-MCNC: 9.6 G/DL — LOW (ref 12–16)
MAGNESIUM SERPL-MCNC: 1.9 MG/DL — SIGNIFICANT CHANGE UP (ref 1.6–2.6)
MCHC RBC-ENTMCNC: 30.8 PG — SIGNIFICANT CHANGE UP (ref 27–31)
MCHC RBC-ENTMCNC: 33.1 G/DL — SIGNIFICANT CHANGE UP (ref 32–36)
MCV RBC AUTO: 92.9 FL — SIGNIFICANT CHANGE UP (ref 81–99)
PLATELET # BLD AUTO: 60 K/UL — LOW (ref 150–400)
POTASSIUM SERPL-MCNC: 3.6 MMOL/L — SIGNIFICANT CHANGE UP (ref 3.5–5.3)
POTASSIUM SERPL-MCNC: 4.2 MMOL/L — SIGNIFICANT CHANGE UP (ref 3.5–5.3)
POTASSIUM SERPL-SCNC: 3.6 MMOL/L — SIGNIFICANT CHANGE UP (ref 3.5–5.3)
POTASSIUM SERPL-SCNC: 4.2 MMOL/L — SIGNIFICANT CHANGE UP (ref 3.5–5.3)
RBC # BLD: 3.12 M/UL — LOW (ref 4.4–5.2)
RBC # FLD: 14.4 % — SIGNIFICANT CHANGE UP (ref 11–15.6)
SODIUM SERPL-SCNC: 136 MMOL/L — SIGNIFICANT CHANGE UP (ref 135–145)
SODIUM SERPL-SCNC: 140 MMOL/L — SIGNIFICANT CHANGE UP (ref 135–145)
WBC # BLD: 4.4 K/UL — LOW (ref 4.8–10.8)
WBC # FLD AUTO: 4.4 K/UL — LOW (ref 4.8–10.8)

## 2018-03-15 PROCEDURE — P9037: CPT

## 2018-03-15 PROCEDURE — 71045 X-RAY EXAM CHEST 1 VIEW: CPT

## 2018-03-15 PROCEDURE — 85027 COMPLETE CBC AUTOMATED: CPT

## 2018-03-15 PROCEDURE — 99153 MOD SED SAME PHYS/QHP EA: CPT

## 2018-03-15 PROCEDURE — 86900 BLOOD TYPING SEROLOGIC ABO: CPT

## 2018-03-15 PROCEDURE — 93005 ELECTROCARDIOGRAM TRACING: CPT

## 2018-03-15 PROCEDURE — 92920 PRQ TRLUML C ANGIOP 1ART&/BR: CPT | Mod: LD,52

## 2018-03-15 PROCEDURE — 99152 MOD SED SAME PHYS/QHP 5/>YRS: CPT

## 2018-03-15 PROCEDURE — C1769: CPT

## 2018-03-15 PROCEDURE — 36430 TRANSFUSION BLD/BLD COMPNT: CPT

## 2018-03-15 PROCEDURE — 80048 BASIC METABOLIC PNL TOTAL CA: CPT

## 2018-03-15 PROCEDURE — 86901 BLOOD TYPING SEROLOGIC RH(D): CPT

## 2018-03-15 PROCEDURE — 80061 LIPID PANEL: CPT

## 2018-03-15 PROCEDURE — 36415 COLL VENOUS BLD VENIPUNCTURE: CPT

## 2018-03-15 PROCEDURE — C1887: CPT

## 2018-03-15 PROCEDURE — 83735 ASSAY OF MAGNESIUM: CPT

## 2018-03-15 PROCEDURE — 93308 TTE F-UP OR LMTD: CPT

## 2018-03-15 PROCEDURE — 86850 RBC ANTIBODY SCREEN: CPT

## 2018-03-15 PROCEDURE — C1894: CPT

## 2018-03-15 PROCEDURE — 71045 X-RAY EXAM CHEST 1 VIEW: CPT | Mod: 26

## 2018-03-15 PROCEDURE — 93306 TTE W/DOPPLER COMPLETE: CPT | Mod: 26

## 2018-03-15 RX ORDER — ASPIRIN/CALCIUM CARB/MAGNESIUM 324 MG
1 TABLET ORAL
Qty: 0 | Refills: 0 | COMMUNITY
Start: 2018-03-15

## 2018-03-15 RX ORDER — FUROSEMIDE 40 MG
1 TABLET ORAL
Qty: 0 | Refills: 0 | COMMUNITY

## 2018-03-15 RX ORDER — METOPROLOL TARTRATE 50 MG
0.5 TABLET ORAL
Qty: 15 | Refills: 0 | OUTPATIENT
Start: 2018-03-15 | End: 2018-04-13

## 2018-03-15 RX ORDER — ASPIRIN/CALCIUM CARB/MAGNESIUM 324 MG
81 TABLET ORAL DAILY
Qty: 0 | Refills: 0 | Status: DISCONTINUED | OUTPATIENT
Start: 2018-03-15 | End: 2018-03-15

## 2018-03-15 RX ORDER — ASPIRIN/CALCIUM CARB/MAGNESIUM 324 MG
1 TABLET ORAL
Qty: 0 | Refills: 0 | COMMUNITY

## 2018-03-15 RX ORDER — POTASSIUM CHLORIDE 20 MEQ
20 PACKET (EA) ORAL ONCE
Qty: 0 | Refills: 0 | Status: COMPLETED | OUTPATIENT
Start: 2018-03-15 | End: 2018-03-15

## 2018-03-15 RX ORDER — METOPROLOL TARTRATE 50 MG
12.5 TABLET ORAL DAILY
Qty: 0 | Refills: 0 | Status: DISCONTINUED | OUTPATIENT
Start: 2018-03-15 | End: 2018-03-15

## 2018-03-15 RX ADMIN — SODIUM CHLORIDE 3 MILLILITER(S): 9 INJECTION INTRAMUSCULAR; INTRAVENOUS; SUBCUTANEOUS at 15:24

## 2018-03-15 RX ADMIN — PANTOPRAZOLE SODIUM 40 MILLIGRAM(S): 20 TABLET, DELAYED RELEASE ORAL at 05:43

## 2018-03-15 RX ADMIN — Medication 20 MILLIEQUIVALENT(S): at 09:31

## 2018-03-15 NOTE — PROGRESS NOTE ADULT - PROBLEM SELECTOR PLAN 1
Consider gentle IV hydration  Encourage PO intake  Monitor labs closely
cont tele   serial EKG  hold bb at present due to chavez cardia  Echo for follow on pericardial effusion

## 2018-03-15 NOTE — PROGRESS NOTE ADULT - PROBLEM SELECTOR PLAN 3
cont tele   hold bb at present due to bradycardia  Latest echo still shows only small pericardial effusion, although increased in size

## 2018-03-15 NOTE — PROGRESS NOTE ADULT - SUBJECTIVE AND OBJECTIVE BOX
Subjective: "I have to leave today, I am dependent on others for a ride." Patient c/o sciatica pains, but otherwise no CP, SOB, N/V/C/D. Patient informed of abnormal lab values today.    VITAL SIGNS  Vital Signs Last 24 Hrs  T(C): 36.9 (03-15-18 @ 05:31), Max: 37.3 (18 @ 13:00)  T(F): 98.4 (03-15-18 @ 05:31), Max: 99.1 (18 @ 13:00)  HR: 74 (03-15-18 @ :31) (66 - 80)  BP: 132/56 (03-15-18 @ 05:31) (113/60 - 132/56)  RR: 20 (03-15-18 @ :31) (18 - 30)  SpO2: 94% (03-15-18 @ 05:31) (92% - 99%)  on RA            Telemetry/Alarms:  SR 60-70  LVEF: 40%    MEDICATIONS  pantoprazole    Tablet 40 milliGRAM(s) Oral before breakfast  sodium chloride 0.9% lock flush 3 milliLiter(s) IV Push every 8 hours      PHYSICAL EXAM  General: well nourished, well developed, no acute distress  Neurology: alert and oriented x 3, nonfocal, no gross deficits  Respiratory: diminished left base with crackles, otherwise clear  CV: regular rate and rhythm, normal S1, S2 + systolic murmur  Abdomen: soft, nontender, nondistended, positive bowel sounds, last bowel movement 3/14  Extremities: warm, well perfused. mild edema. + DP pulses        03-14 @ 07:01  -  15 @ 07:00  --------------------------------------------------------  IN: 1352 mL / OUT: 1000 mL / NET: 352 mL        Weights:  Daily     Daily Weight in k (15 Mar 2018 05:00)  Admit Wt: Drug Dosing Weight  Height (cm): 160 (13 Mar 2018 08:50)  Weight (kg): 71.214 (13 Mar 2018 08:50)  BMI (kg/m2): 27.8 (13 Mar 2018 08:50)  BSA (m2): 1.74 (13 Mar 2018 08:50)    LABS  03-15    140  |  104  |  46.0<H>  ----------------------------<  109  3.6   |  21.0<L>  |  1.51<H>    Ca    8.7      15 Mar 2018 06:02  Mg     1.9     03-15                                   9.6    4.4   )-----------( 60       ( 15 Mar 2018 06:02 )             29.0              CAPILLARY BLOOD GLUCOSE    latest echo: < from: TTE Echo Limited or F/U (18 @ 07:00) >    Summary:   1. Technically limited study. Studyperformed for evaluation of   pericardial effusion.   2. Low normal global left ventricular systolic function.   3. Left ventricular ejection fraction, by visual estimation, is 50 to   55%.   4. Multiple left ventricular regional wall motion abnormalities exist.   See wall motion findings.   5. Moderate tricuspid regurgitation.   6. There is a small pericardial effusion which appears less in size than   a study from last night. IVC is dilated without respiratory variation.   7. There are bilateral pleural effusions as well as ascites.   8. Findings were D/W Marybel, CT-ICU PA.    [id number] Bird Guardado MD, MD Electronically signed on 3/14/2018 at   7:56:26 AM    < end of copied text >             Today's CXR: < from: Xray Chest 1 View- PORTABLE-Routine (03.15.18 @ 05:11) >  IMPRESSION:    Support Devices: None  Heart: Cardiomegaly  Mediastinum: Possible pneumomediastinum  Lungs/Airways: Small left pleural effusion with or without underlying   airspace disease. No pneumothorax.  Bones/Soft tissues: Unremarkable    < end of copied text >      Today's EKG: < from: 12 Lead ECG (18 @ 13:12) >  Diagnosis Line Normal sinus rhythm  Left axis deviation  Right bundle branch block  Anteroseptal infarct , possibly acute  Possible Lateral infarct , age undetermined  T wave abnormality, consider anterior ischemia  Abnormal ECG    < end of copied text >      PAST MEDICAL & SURGICAL HISTORY:  Vitamin D deficiency  Thrombocytopenia  Renal insufficiency  Portal hypertension  Non-alcoholic fatty liver disease  Hyperparathyroidism  HLD (hyperlipidemia)  Elevated TSH  HTN (hypertension)  Aortic stenosis  Cirrhosis of liver  NAFLD (nonalcoholic fatty liver disease)  White coat syndrome without diagnosis of hypertension  S/P coronary angiogram: 2018  S/P cataract surgery  S/P cholecystectomy  S/P ANURAG-BSO  History of appendectomy

## 2018-03-15 NOTE — PROGRESS NOTE ADULT - ASSESSMENT
82F with PMH of Chronic Systolic CHF (reported EF ~ 40%), CAD, Mild AS, HTN, HLD, Non-alcoholic Fatty Liver Disease, Cirrhosis (Child's Class B to C, followed by GI: Margarito), Portal HTN, Thrombocytopenia, Chronic Renal Insufficiency s/p LAD perforation during attempted PCI, currently stable and asymptomatic off Dopamine. Given her history of cirrhosis, she is not a candidate for CABG and plan is to proceed with medical therapy.     3/14 echo: small pericardial effusion, slightly increased in size, no tamponade noted.    3/15 Bump in creatinine from 1 to 1.5; thrombocytopenia, down to 60 from 70. Chest xray shows small left effusion with pneumomediastinum.     Overall Plan  Will discuss with Chhaya Banks and Maya regarding medical care and discharge plan for this patient.  Consider renal consult  Repeat labs this afternoon  Replete potassium gently for hypokalemia  ? repeat echo today?  D/W CTS team at AM rounds. 82F with PMH of Chronic Systolic CHF (reported EF ~ 40%), CAD, Mild AS, HTN, HLD, Non-alcoholic Fatty Liver Disease, Cirrhosis (Child's Class B to C, followed by GI: Margarito), Portal HTN, Thrombocytopenia, Chronic Renal Insufficiency s/p LAD perforation during attempted PCI, currently stable and asymptomatic off Dopamine. Given her history of cirrhosis, she is not a candidate for CABG and plan is to proceed with medical therapy.     3/14 echo: small pericardial effusion, slightly less in size, no tamponade noted.    3/15 Bump in creatinine from 1 to 1.5; thrombocytopenia, down to 60 from 70. Chest xray shows small left effusion with pneumomediastinum.     Overall Plan  Will discuss with Chhaya Banks and Maya regarding medical care and discharge plan for this patient.  Consider renal consult  Repeat labs this afternoon  Replete potassium gently for hypokalemia  ? repeat echo today?  D/W CTS team at AM rounds.

## 2018-03-15 NOTE — CHART NOTE - NSCHARTNOTEFT_GEN_A_CORE
CTS PA    Discussed case with Dr Trejo:  -repeat echo this morning  -repeat labs at noon  -discharge home if improved or no change

## 2018-03-16 ENCOUNTER — MOBILE ON CALL (OUTPATIENT)
Age: 83
End: 2018-03-16

## 2018-03-16 ENCOUNTER — RESULT REVIEW (OUTPATIENT)
Age: 83
End: 2018-03-16

## 2018-03-18 ENCOUNTER — FORM ENCOUNTER (OUTPATIENT)
Age: 83
End: 2018-03-18

## 2018-03-19 ENCOUNTER — APPOINTMENT (OUTPATIENT)
Dept: FAMILY MEDICINE | Facility: CLINIC | Age: 83
End: 2018-03-19
Payer: MEDICARE

## 2018-03-19 ENCOUNTER — APPOINTMENT (OUTPATIENT)
Dept: ULTRASOUND IMAGING | Facility: CLINIC | Age: 83
End: 2018-03-19

## 2018-03-19 ENCOUNTER — LABORATORY RESULT (OUTPATIENT)
Age: 83
End: 2018-03-19

## 2018-03-19 ENCOUNTER — OUTPATIENT (OUTPATIENT)
Dept: OUTPATIENT SERVICES | Facility: HOSPITAL | Age: 83
LOS: 1 days | End: 2018-03-19
Payer: MEDICARE

## 2018-03-19 VITALS
SYSTOLIC BLOOD PRESSURE: 172 MMHG | WEIGHT: 161 LBS | OXYGEN SATURATION: 98 % | RESPIRATION RATE: 14 BRPM | HEIGHT: 63 IN | HEART RATE: 71 BPM | DIASTOLIC BLOOD PRESSURE: 82 MMHG | BODY MASS INDEX: 28.53 KG/M2

## 2018-03-19 DIAGNOSIS — Z98.890 OTHER SPECIFIED POSTPROCEDURAL STATES: Chronic | ICD-10-CM

## 2018-03-19 DIAGNOSIS — M54.16 RADICULOPATHY, LUMBAR REGION: ICD-10-CM

## 2018-03-19 DIAGNOSIS — N28.9 DISORDER OF KIDNEY AND URETER, UNSPECIFIED: ICD-10-CM

## 2018-03-19 DIAGNOSIS — Z90.710 ACQUIRED ABSENCE OF BOTH CERVIX AND UTERUS: Chronic | ICD-10-CM

## 2018-03-19 DIAGNOSIS — Z90.49 ACQUIRED ABSENCE OF OTHER SPECIFIED PARTS OF DIGESTIVE TRACT: Chronic | ICD-10-CM

## 2018-03-19 DIAGNOSIS — K76.0 FATTY (CHANGE OF) LIVER, NOT ELSEWHERE CLASSIFIED: ICD-10-CM

## 2018-03-19 DIAGNOSIS — Z98.49 CATARACT EXTRACTION STATUS, UNSPECIFIED EYE: Chronic | ICD-10-CM

## 2018-03-19 DIAGNOSIS — R60.0 LOCALIZED EDEMA: ICD-10-CM

## 2018-03-19 PROCEDURE — 93971 EXTREMITY STUDY: CPT | Mod: 26,LT

## 2018-03-19 PROCEDURE — 99215 OFFICE O/P EST HI 40 MIN: CPT | Mod: 25

## 2018-03-19 PROCEDURE — 36415 COLL VENOUS BLD VENIPUNCTURE: CPT

## 2018-03-19 RX ORDER — ENALAPRIL MALEATE 2.5 MG/1
2.5 TABLET ORAL
Qty: 30 | Refills: 0 | Status: DISCONTINUED | COMMUNITY
Start: 2018-02-13 | End: 2018-03-19

## 2018-03-23 DIAGNOSIS — I65.21 OCCLUSION AND STENOSIS OF RIGHT CAROTID ARTERY: ICD-10-CM

## 2018-03-23 DIAGNOSIS — I45.10 UNSPECIFIED RIGHT BUNDLE-BRANCH BLOCK: ICD-10-CM

## 2018-03-23 DIAGNOSIS — I25.10 ATHEROSCLEROTIC HEART DISEASE OF NATIVE CORONARY ARTERY W/OUT ANGINA PECTORIS: ICD-10-CM

## 2018-03-27 LAB
ALBUMIN SERPL ELPH-MCNC: 3.6 G/DL
ALP BLD-CCNC: 85 U/L
ALT SERPL-CCNC: 19 U/L
ANION GAP SERPL CALC-SCNC: 14 MMOL/L
AST SERPL-CCNC: 45 U/L
BASOPHILS # BLD AUTO: 0.03 K/UL
BASOPHILS NFR BLD AUTO: 0.6 %
BILIRUB SERPL-MCNC: 2.4 MG/DL
BUN SERPL-MCNC: 19 MG/DL
CALCIUM SERPL-MCNC: 9.3 MG/DL
CHLORIDE SERPL-SCNC: 105 MMOL/L
CO2 SERPL-SCNC: 26 MMOL/L
CREAT SERPL-MCNC: 1.1 MG/DL
EOSINOPHIL # BLD AUTO: 0.2 K/UL
EOSINOPHIL NFR BLD AUTO: 4.1 %
GLUCOSE SERPL-MCNC: 108 MG/DL
HCT VFR BLD CALC: 37.3 %
HGB BLD-MCNC: 12.2 G/DL
IMM GRANULOCYTES NFR BLD AUTO: 0.6 %
LYMPHOCYTES # BLD AUTO: 0.45 K/UL
LYMPHOCYTES NFR BLD AUTO: 9.3 %
MAN DIFF?: NORMAL
MCHC RBC-ENTMCNC: 32.1 PG
MCHC RBC-ENTMCNC: 32.7 GM/DL
MCV RBC AUTO: 98.2 FL
MONOCYTES # BLD AUTO: 0.5 K/UL
MONOCYTES NFR BLD AUTO: 10.4 %
NEUTROPHILS # BLD AUTO: 3.61 K/UL
NEUTROPHILS NFR BLD AUTO: 75 %
PLATELET # BLD AUTO: 121 K/UL
POTASSIUM SERPL-SCNC: 4.7 MMOL/L
PROT SERPL-MCNC: 6.6 G/DL
RBC # BLD: 3.8 M/UL
RBC # FLD: 14.8 %
SODIUM SERPL-SCNC: 145 MMOL/L
WBC # FLD AUTO: 4.82 K/UL

## 2018-03-28 ENCOUNTER — INPATIENT (INPATIENT)
Facility: HOSPITAL | Age: 83
LOS: 1 days | Discharge: HOME | DRG: 392 | End: 2018-03-30
Attending: HOSPITALIST | Admitting: INTERNAL MEDICINE
Payer: MEDICARE

## 2018-03-28 VITALS
HEIGHT: 63 IN | SYSTOLIC BLOOD PRESSURE: 154 MMHG | OXYGEN SATURATION: 98 % | WEIGHT: 156.97 LBS | HEART RATE: 68 BPM | DIASTOLIC BLOOD PRESSURE: 74 MMHG | RESPIRATION RATE: 18 BRPM | TEMPERATURE: 98 F

## 2018-03-28 DIAGNOSIS — Z98.890 OTHER SPECIFIED POSTPROCEDURAL STATES: Chronic | ICD-10-CM

## 2018-03-28 DIAGNOSIS — Z90.710 ACQUIRED ABSENCE OF BOTH CERVIX AND UTERUS: Chronic | ICD-10-CM

## 2018-03-28 DIAGNOSIS — Z90.49 ACQUIRED ABSENCE OF OTHER SPECIFIED PARTS OF DIGESTIVE TRACT: Chronic | ICD-10-CM

## 2018-03-28 DIAGNOSIS — K62.5 HEMORRHAGE OF ANUS AND RECTUM: ICD-10-CM

## 2018-03-28 DIAGNOSIS — Z98.49 CATARACT EXTRACTION STATUS, UNSPECIFIED EYE: Chronic | ICD-10-CM

## 2018-03-28 LAB
ALBUMIN SERPL ELPH-MCNC: 3.1 G/DL — LOW (ref 3.3–5.2)
ALP SERPL-CCNC: 84 U/L — SIGNIFICANT CHANGE UP (ref 40–120)
ALT FLD-CCNC: 17 U/L — SIGNIFICANT CHANGE UP
ANION GAP SERPL CALC-SCNC: 10 MMOL/L — SIGNIFICANT CHANGE UP (ref 5–17)
APTT BLD: 32 SEC — SIGNIFICANT CHANGE UP (ref 27.5–37.4)
AST SERPL-CCNC: 43 U/L — HIGH
BILIRUB SERPL-MCNC: 1.8 MG/DL — SIGNIFICANT CHANGE UP (ref 0.4–2)
BUN SERPL-MCNC: 14 MG/DL — SIGNIFICANT CHANGE UP (ref 8–20)
CALCIUM SERPL-MCNC: 8.8 MG/DL — SIGNIFICANT CHANGE UP (ref 8.6–10.2)
CHLORIDE SERPL-SCNC: 101 MMOL/L — SIGNIFICANT CHANGE UP (ref 98–107)
CO2 SERPL-SCNC: 32 MMOL/L — HIGH (ref 22–29)
CREAT SERPL-MCNC: 0.88 MG/DL — SIGNIFICANT CHANGE UP (ref 0.5–1.3)
GLUCOSE SERPL-MCNC: 224 MG/DL — HIGH (ref 70–115)
HCT VFR BLD CALC: 33.2 % — LOW (ref 37–47)
HCT VFR BLD CALC: 36 % — LOW (ref 37–47)
HGB BLD-MCNC: 10.8 G/DL — LOW (ref 12–16)
HGB BLD-MCNC: 11.7 G/DL — LOW (ref 12–16)
INR BLD: 1.28 RATIO — HIGH (ref 0.88–1.16)
MCHC RBC-ENTMCNC: 31 PG — SIGNIFICANT CHANGE UP (ref 27–31)
MCHC RBC-ENTMCNC: 32.5 G/DL — SIGNIFICANT CHANGE UP (ref 32–36)
MCV RBC AUTO: 95.2 FL — SIGNIFICANT CHANGE UP (ref 81–99)
OB PNL STL: POSITIVE
PLATELET # BLD AUTO: 100 K/UL — LOW (ref 150–400)
POTASSIUM SERPL-MCNC: 3.4 MMOL/L — LOW (ref 3.5–5.3)
POTASSIUM SERPL-SCNC: 3.4 MMOL/L — LOW (ref 3.5–5.3)
PROT SERPL-MCNC: 5.7 G/DL — LOW (ref 6.6–8.7)
PROTHROM AB SERPL-ACNC: 14.2 SEC — HIGH (ref 9.8–12.7)
RBC # BLD: 3.78 M/UL — LOW (ref 4.4–5.2)
RBC # FLD: 15.3 % — SIGNIFICANT CHANGE UP (ref 11–15.6)
SODIUM SERPL-SCNC: 143 MMOL/L — SIGNIFICANT CHANGE UP (ref 135–145)
TROPONIN T SERPL-MCNC: <0.01 NG/ML — SIGNIFICANT CHANGE UP (ref 0–0.06)
TYPE + AB SCN PNL BLD: SIGNIFICANT CHANGE UP
WBC # BLD: 5.2 K/UL — SIGNIFICANT CHANGE UP (ref 4.8–10.8)
WBC # FLD AUTO: 5.2 K/UL — SIGNIFICANT CHANGE UP (ref 4.8–10.8)

## 2018-03-28 PROCEDURE — 93010 ELECTROCARDIOGRAM REPORT: CPT

## 2018-03-28 PROCEDURE — 99285 EMERGENCY DEPT VISIT HI MDM: CPT

## 2018-03-28 PROCEDURE — 99223 1ST HOSP IP/OBS HIGH 75: CPT

## 2018-03-28 RX ORDER — CARVEDILOL PHOSPHATE 80 MG/1
0 CAPSULE, EXTENDED RELEASE ORAL
Qty: 0 | Refills: 0 | COMMUNITY

## 2018-03-28 RX ORDER — PANTOPRAZOLE SODIUM 20 MG/1
40 TABLET, DELAYED RELEASE ORAL ONCE
Qty: 0 | Refills: 0 | Status: COMPLETED | OUTPATIENT
Start: 2018-03-28 | End: 2018-03-28

## 2018-03-28 RX ORDER — POTASSIUM CHLORIDE 20 MEQ
40 PACKET (EA) ORAL EVERY 4 HOURS
Qty: 0 | Refills: 0 | Status: COMPLETED | OUTPATIENT
Start: 2018-03-28 | End: 2018-03-28

## 2018-03-28 RX ORDER — PANTOPRAZOLE SODIUM 20 MG/1
40 TABLET, DELAYED RELEASE ORAL EVERY 12 HOURS
Qty: 0 | Refills: 0 | Status: DISCONTINUED | OUTPATIENT
Start: 2018-03-29 | End: 2018-03-30

## 2018-03-28 RX ORDER — FUROSEMIDE 40 MG
0 TABLET ORAL
Qty: 0 | Refills: 0 | COMMUNITY

## 2018-03-28 RX ORDER — CARVEDILOL PHOSPHATE 80 MG/1
3.12 CAPSULE, EXTENDED RELEASE ORAL EVERY 12 HOURS
Qty: 0 | Refills: 0 | Status: DISCONTINUED | OUTPATIENT
Start: 2018-03-28 | End: 2018-03-30

## 2018-03-28 RX ADMIN — Medication 40 MILLIEQUIVALENT(S): at 23:47

## 2018-03-28 RX ADMIN — PANTOPRAZOLE SODIUM 40 MILLIGRAM(S): 20 TABLET, DELAYED RELEASE ORAL at 18:59

## 2018-03-28 RX ADMIN — Medication 40 MILLIEQUIVALENT(S): at 19:46

## 2018-03-28 NOTE — ED PROVIDER NOTE - OBJECTIVE STATEMENT
81 y/o F pt with hx of HTN, HLD, liver disease, cirrhosis of liver, appendectomy, cholecystectomy, aortic stenosis BIBA to ED c/o black stool, diarrhea, feeling lightheaded and dizzy x 2 days s/p cardiac stent placement 2 weeks ago. Pt states she has been taking Pepto Bismol for abdominal symptoms. Pt had checkup 5 days ago with cardiologist and he changed her bp mediation from the medication she was placed on after procedure. No LOC. Denies sick contacts at home. Pt states there were complications with stent placement (Dr. Piedra). Pt denies abd pain, SOB, chest pain  PMD: Dr. Griffiths  Cardiologist: Dr. Zaman  Surgeon: Dr. Piedra 83 y/o F pt with hx of HTN, HLD, liver disease, cirrhosis of liver, appendectomy, cholecystectomy, aortic stenosis BIBA to ED c/o black stool, diarrhea, feeling lightheaded, fatigued and dizzy x 2 days s/p cardiac stent placement 2 weeks ago. Pt's cardiac stent placement was unsuccessful. Pt states she has been taking Pepto Bismol for abdominal symptoms. Pt had checkup 5 days ago with cardiologist and he changed her bp mediation from the medication she was placed on after procedure. No LOC. Denies sick contacts at home. Pt states there were complications with stent placement (Dr. Piedra). Pt denies abd pain, SOB, chest pain  PMD: Dr. Griffiths  Cardiologist: Dr. Zaman  Surgeon: Dr. Piedra This patient is an 81 y/o woman with hx of HTN, HLD, liver disease, cirrhosis of liver, appendectomy, cholecystectomy, aortic stenosis BIBA c/o black stool, diarrhea, feeling lightheaded, fatigued and dizzy x 2 days.  Pt states she has been taking Pepto Bismol for abdominal symptoms.  Currently in the ER she denies abdominal pain.  Patient is s/p cardiac catheterization done 2 weeks ago with unsuccessful stent placement.    PMD: Dr. Griffiths  Cardiologist: Dr. Zaman  Interventional cardiologist: Dr. Piedra

## 2018-03-28 NOTE — H&P ADULT - NSHPPHYSICALEXAM_GEN_ALL_CORE
Vital Signs   T(C): 36.7 (28 Mar 2018 19:42), Max: 36.7 (28 Mar 2018 19:42)  T(F): 98.1 (28 Mar 2018 19:42), Max: 98.1 (28 Mar 2018 19:42)  HR: 64 (28 Mar 2018 19:42) (64 - 68)  BP: 136/60 (28 Mar 2018 19:42) (136/60 - 154/74)  RR: 18 (28 Mar 2018 19:42) (18 - 18)  SpO2: 97% (28 Mar 2018 19:42) (97% - 98%)  General: Well developed. Well nourished. No acute distress  HEENT: PERRLA. EOMI. Clear conjunctivae. Moist mucus membrane  Neck: Supple. No JVD. No Thyromegaly   Chest: CTA bilaterally - no wheezing, rales or rhonchi.   Heart: Normal S1 & S2 with RRR.   Abdomen: Soft. Non-tender. Non-distended. + BS  Rectal: As per ED Provider - black stools. Guaiac positive. No hemorrhoids.   Ext: No pedal edema. No calf tenderness   Neuro: AAO x 3, No focal deficit, CN II-XII grossly WNL and no speech disorder  Skin: Warm and Dry  Psychiatry: Normal mood and affect

## 2018-03-28 NOTE — ED PROVIDER NOTE - MEDICAL DECISION MAKING DETAILS
pt with lightheadedness, fatigue, unsuccessful cardiac catheterization. now reporting black stool. guaiac, labs, EKG, reassess

## 2018-03-28 NOTE — H&P ADULT - NSHPLABSRESULTS_GEN_ALL_CORE
LABS:                        11.7   5.2   )-----------( 100      ( 28 Mar 2018 14:25 )             36.0     03-28    143  |  101  |  14.0  ----------------------------<  224<H>  3.4<L>   |  32.0<H>  |  0.88    Ca    8.8      28 Mar 2018 14:25    TPro  5.7<L>  /  Alb  3.1<L>  /  TBili  1.8  /  DBili  x   /  AST  43<H>  /  ALT  17  /  AlkPhos  84  03-28    PT/INR - ( 28 Mar 2018 14:25 )   PT: 14.2 sec;   INR: 1.28 ratio         PTT - ( 28 Mar 2018 14:25 )  PTT:32.0 sec  CARDIAC MARKERS ( 28 Mar 2018 16:45 )  x     / <0.01 ng/mL / x     / x     / x      CARDIAC MARKERS ( 28 Mar 2018 14:25 )  x     / x     / 103 U/L / x     / x          EKG: NSR at 60 bpm. Left axis deviation. No acute ST changes.

## 2018-03-28 NOTE — ED ADULT NURSE REASSESSMENT NOTE - NS ED NURSE REASSESS COMMENT FT1
pt admitted. pt awaiting bed availability on floor. will continue to monitor and reassess,.
pt with no complaints at this time. pt explained plan of care. will continue to monitor.

## 2018-03-28 NOTE — CHART NOTE - NSCHARTNOTEFT_GEN_A_CORE
REASON FOR Tele-evaluation      CC: Dizzy and lightheaded    HPI: 81 yo female with pmhx of systolic CHF, CAD, Cirrhosis 2/2 ROBLES, thrombocytopenia presents with a 3 day history of dizziness, lightheadedness, and black stools.  Of note pt recently admitted for scheduled PCI, procedure was complicated by LAD perforation, without stent placement.  At that time pt was deemed not to be a candidate for CABG, and was d/c on medical management.  She now presents with the symptoms listed above. Pt denies any NSAID use, denies any n/v/d, chest pain or SOB.  Pt denies a hx of PUD, and has no known hx of varices.  Pt has been taking pepto bismol during this episode, but does not report improvement in her symptoms.  ROS otherwise negative.      Pmhx: CHF, CAD, Cirrhosis, thrombocytopenia, HTN, HLD  Pshx: cholecystectomy, appendectomy, hysterectomy  Soc Hx: former smoker quit 40 years ago  NKDA     OBJECTIVE  ROS as outlined above    Vital Signs Last 24 Hrs  T(C): 36.4 (28 Mar 2018 13:07), Max: 36.4 (28 Mar 2018 13:07)  T(F): 97.5 (28 Mar 2018 13:07), Max: 97.5 (28 Mar 2018 13:07)  HR: 68 (28 Mar 2018 13:07) (68 - 68)  BP: 154/74 (28 Mar 2018 13:07) (154/74 - 154/74)  BP(mean): --  RR: 18 (28 Mar 2018 13:07) (18 - 18)  SpO2: 98% (28 Mar 2018 13:07) (98% - 98%)    Orthostatic borderline positive (-->129)    PHYSICAL EXAM: Tele-evaluation precludes physical exam. Pertinent physical exam findings as per verbal communication by nurse aid at bedside are as following:  Well appearing female sitting in bed in NAD    Ext: Trace edema      ASSESSMENT AND PLAN: 81 yo female with hx listed above presents with dizziness and black stools concerning for UGIB    #Dizziness  - Pt with dizziness and black stools concerning for UGIB especially in the setting of cirrhosis and ASA use  - Rec to hold ASA  - PPI IV BID, and clear liquid diet in the event that pt needs endoscopic intervention  - CBC q12    #HTN  - C/w Coreg BID with hold parameters  - Holding Lasix in the setting of possible UGIB      Care plan discussed with (***) REASON FOR Tele-evaluation      CC: Dizzy and lightheaded    HPI: 83 yo female with pmhx of systolic CHF, CAD, Cirrhosis 2/2 ROBLES, thrombocytopenia presents with a 3 day history of dizziness, lightheadedness, and black stools.  Of note pt recently admitted for scheduled PCI, procedure was complicated by LAD perforation, without stent placement.  At that time pt was deemed not to be a candidate for CABG, and was d/c on medical management.  She now presents with the symptoms listed above. Pt denies any NSAID use, denies any n/v/d, chest pain or SOB.  Pt denies a hx of PUD, and has no known hx of varices.  Pt has been taking pepto bismol during this episode, but does not report improvement in her symptoms.  ROS otherwise negative.      Pmhx: CHF, CAD, Cirrhosis, thrombocytopenia, HTN, HLD  Pshx: cholecystectomy, appendectomy, hysterectomy  Soc Hx: former smoker quit 40 years ago  NKDA     OBJECTIVE  ROS as outlined above    Vital Signs Last 24 Hrs  T(C): 36.4 (28 Mar 2018 13:07), Max: 36.4 (28 Mar 2018 13:07)  T(F): 97.5 (28 Mar 2018 13:07), Max: 97.5 (28 Mar 2018 13:07)  HR: 68 (28 Mar 2018 13:07) (68 - 68)  BP: 154/74 (28 Mar 2018 13:07) (154/74 - 154/74)  BP(mean): --  RR: 18 (28 Mar 2018 13:07) (18 - 18)  SpO2: 98% (28 Mar 2018 13:07) (98% - 98%)    Orthostatic borderline positive (-->129)    PHYSICAL EXAM: Tele-evaluation precludes physical exam. Pertinent physical exam findings as per verbal communication by nurse aid at bedside are as following:  Well appearing female sitting in bed in NAD    Ext: Trace edema      ASSESSMENT AND PLAN: 83 yo female with hx listed above presents with dizziness and black stools concerning for UGIB    #Dizziness  - Pt with dizziness and black stools concerning for UGIB especially in the setting of cirrhosis and ASA use  - Rec to hold ASA  - PPI IV BID, and clear liquid diet in the event that pt needs endoscopic intervention  - CBC q12    #HTN  - C/w Coreg BID with hold parameters  - Holding Lasix in the setting of possible UGIB      Care plan discussed with Dr. TITI Lozano at 7:16pm

## 2018-03-28 NOTE — H&P ADULT - HISTORY OF PRESENT ILLNESS
82 years old female with PMH of CAD, HFrEF, Liver Cirrhosis (NAFLD) and Thrombocytopenia comes with black stools. As per patient, she is having loose bowel movement for few days and so she started taking Pepto-Bismol. 3 days ago, she noticed black stools - denies conner bleeding. She is feeling very weak and lightheaded at times. Denies abdominal pain, nausea, vomiting, fever or change in her diet. No history of GI Bleed in past.   She recently had Cardiac Cath (3/13/18) which was complicated by LAD perforation. Denies chest pain, palpitations or shortness of breath. She is on Aspirin.   No OTC NSAIDs.

## 2018-03-28 NOTE — ED PROVIDER NOTE - CARE PLAN
Principal Discharge DX:	Rectal bleeding  Secondary Diagnosis:	Dizziness  Secondary Diagnosis:	Fatigue, unspecified type

## 2018-03-28 NOTE — ED ADULT TRIAGE NOTE - CHIEF COMPLAINT QUOTE
was suppose to have a cardiac stent a week and half ago, during the procedure "they nicked the artery and stopped the procedure". they put her on blood pressure meds. black stools since yesterday, feels week today. denies cp or sob.

## 2018-03-28 NOTE — H&P ADULT - ASSESSMENT
82 years old female with PMH of CAD, HFrEF, Liver Cirrhosis (NAFLD) and Thrombocytopenia comes with black stools. As per patient, she is having loose bowel movement for few days and so she started taking Pepto-Bismol. 3 days ago, she noticed black stools - denies conner bleeding. She is feeling very weak and lightheaded at times. Denies abdominal pain, nausea, vomiting, fever or change in her diet. No history of GI Bleed in past.   She recently had Cardiac Cath (3/13/18) which was complicated by LAD perforation. Denies chest pain, palpitations or shortness of breath. She is on Aspirin.   No OTC NSAIDs. 82 years old female with PMH of CAD, HFrEF, Liver Cirrhosis (NAFLD) and Thrombocytopenia comes with black stools. As per patient, she is having loose bowel movement for few days and so she started taking Pepto-Bismol. 3 days ago, she noticed black stools - denies conner bleeding. She is feeling very weak and lightheaded at times. Denies abdominal pain, nausea, vomiting, fever or change in her diet. No history of GI Bleed in past.   She recently had Cardiac Cath (3/13/18) which was complicated by LAD perforation. Denies chest pain, palpitations or shortness of breath. She is on Aspirin.   No OTC NSAIDs.     1) Rule out GI Bleed  - Monitor H&H  - Transfuse if Hemoglobin drops < 8.  - Protonix 40 mg q 12 hours  - Clear liquid diet  - GI Consult in am  2) Liver Cirrhosis  - Likely secondary to NAFLD  - No history of varices or variceal bleed  - Follows Dr. Pimentel  3) Thrombocytopenia  - Chronic. Likely secondary to Liver Cirrhosis.  4) 82 years old female with PMH of CAD, HFrEF, Liver Cirrhosis (NAFLD) and Thrombocytopenia comes with black stools. As per patient, she is having loose bowel movement for few days and so she started taking Pepto-Bismol. 3 days ago, she noticed black stools - denies conner bleeding. She is feeling very weak and lightheaded at times. Denies abdominal pain, nausea, vomiting, fever or change in her diet. No history of GI Bleed in past.   She recently had Cardiac Cath (3/13/18) which was complicated by LAD perforation. Denies chest pain, palpitations or shortness of breath. She is on Aspirin.   No OTC NSAIDs.     1) Rule out GI Bleed  - Monitor H&H  - Transfuse if Hemoglobin drops < 8.  - Protonix 40 mg q 12 hours  - Clear liquid diet  - Hold Aspirin for now  - GI Consult in am  2) Liver Cirrhosis  - Likely secondary to NAFLD  - No history of varices or variceal bleed  - Follows Dr. Pimentel  3) Thrombocytopenia  - Chronic. Likely secondary to Liver Cirrhosis.  4) CAD  - Hold Aspirin for now  - Not on Statins  - Continue Coreg with holding parameters  5) HFrEF  - Hold Lasix  - Not on ACEI due to hypotension/renal insufficiency  - Continue Coreg  6) Hyperglycemia  - No history of DM  - HbA1c  DVT Prophylaxis -- IPC 82 years old female with PMH of CAD, HFrEF, Liver Cirrhosis (NAFLD) and Thrombocytopenia comes with black stools. As per patient, she is having loose bowel movement for few days and so she started taking Pepto-Bismol. 3 days ago, she noticed black stools - denies conner bleeding. She is feeling very weak and lightheaded at times. Denies abdominal pain, nausea, vomiting, fever or change in her diet. No history of GI Bleed in past.   She recently had Cardiac Cath (3/13/18) which was complicated by LAD perforation. Denies chest pain, palpitations or shortness of breath. She is on Aspirin.   No OTC NSAIDs.     1) Rule out GI Bleed  - Monitor H&H  - Transfuse if Hemoglobin drops < 8.  - Protonix 40 mg q 12 hours  - Clear liquid diet  - Hold Aspirin for now  - GI Consult in am  2) Liver Cirrhosis  - Likely secondary to NAFLD  - No history of varices or variceal bleed  - Follows Dr. Pimentel  3) Thrombocytopenia  - Chronic. Likely secondary to Liver Cirrhosis.  4) CAD  - Hold Aspirin for now  - Not on Statins  - Continue Coreg with holding parameters  5) HFrEF  - Hold Lasix  - Not on ACEI due to hypotension/renal insufficiency  - Continue Coreg  6) Hyperglycemia  - No history of DM  - HbA1c  7) Hypokalemia  - Mild  - KCl 40 mEq  DVT Prophylaxis -- IPC

## 2018-03-28 NOTE — ED ADULT NURSE NOTE - OBJECTIVE STATEMENT
pt presents to ED with black stools since yesterday. pt states she also took pepto bismol yesterday. pt c/o generalized weakness and dizziness at times. denies chest pain/sob. pt states she recently has cardiac catherization to b/l groins two weeks ago. cath was unsuccessful, MD "nicked" the artery. breathing si even and unlabored. pt denies use of anticoagulants. denies abd pain. a&ox3., will continue to monitor and reassess

## 2018-03-29 DIAGNOSIS — R19.7 DIARRHEA, UNSPECIFIED: ICD-10-CM

## 2018-03-29 DIAGNOSIS — K74.69 OTHER CIRRHOSIS OF LIVER: ICD-10-CM

## 2018-03-29 LAB
ANION GAP SERPL CALC-SCNC: 10 MMOL/L — SIGNIFICANT CHANGE UP (ref 5–17)
BASOPHILS # BLD AUTO: 0 K/UL — SIGNIFICANT CHANGE UP (ref 0–0.2)
BASOPHILS NFR BLD AUTO: 0.4 % — SIGNIFICANT CHANGE UP (ref 0–2)
BUN SERPL-MCNC: 14 MG/DL — SIGNIFICANT CHANGE UP (ref 8–20)
CALCIUM SERPL-MCNC: 8.7 MG/DL — SIGNIFICANT CHANGE UP (ref 8.6–10.2)
CHLORIDE SERPL-SCNC: 105 MMOL/L — SIGNIFICANT CHANGE UP (ref 98–107)
CO2 SERPL-SCNC: 30 MMOL/L — HIGH (ref 22–29)
CREAT SERPL-MCNC: 0.82 MG/DL — SIGNIFICANT CHANGE UP (ref 0.5–1.3)
EOSINOPHIL # BLD AUTO: 0.2 K/UL — SIGNIFICANT CHANGE UP (ref 0–0.5)
EOSINOPHIL NFR BLD AUTO: 4 % — SIGNIFICANT CHANGE UP (ref 0–6)
GLUCOSE SERPL-MCNC: 110 MG/DL — SIGNIFICANT CHANGE UP (ref 70–115)
HBA1C BLD-MCNC: 5.3 % — SIGNIFICANT CHANGE UP (ref 4–5.6)
HCT VFR BLD CALC: 34.4 % — LOW (ref 37–47)
HGB BLD-MCNC: 11.6 G/DL — LOW (ref 12–16)
LYMPHOCYTES # BLD AUTO: 0.5 K/UL — LOW (ref 1–4.8)
LYMPHOCYTES # BLD AUTO: 10.1 % — LOW (ref 20–55)
MAGNESIUM SERPL-MCNC: 1.6 MG/DL — SIGNIFICANT CHANGE UP (ref 1.6–2.6)
MCHC RBC-ENTMCNC: 32.3 PG — HIGH (ref 27–31)
MCHC RBC-ENTMCNC: 33.7 G/DL — SIGNIFICANT CHANGE UP (ref 32–36)
MCV RBC AUTO: 95.8 FL — SIGNIFICANT CHANGE UP (ref 81–99)
MONOCYTES # BLD AUTO: 0.6 K/UL — SIGNIFICANT CHANGE UP (ref 0–0.8)
MONOCYTES NFR BLD AUTO: 11.5 % — HIGH (ref 3–10)
NEUTROPHILS # BLD AUTO: 3.7 K/UL — SIGNIFICANT CHANGE UP (ref 1.8–8)
NEUTROPHILS NFR BLD AUTO: 73.8 % — HIGH (ref 37–73)
PLATELET # BLD AUTO: 107 K/UL — LOW (ref 150–400)
POTASSIUM SERPL-MCNC: 3.6 MMOL/L — SIGNIFICANT CHANGE UP (ref 3.5–5.3)
POTASSIUM SERPL-SCNC: 3.6 MMOL/L — SIGNIFICANT CHANGE UP (ref 3.5–5.3)
RBC # BLD: 3.59 M/UL — LOW (ref 4.4–5.2)
RBC # FLD: 15.3 % — SIGNIFICANT CHANGE UP (ref 11–15.6)
SODIUM SERPL-SCNC: 145 MMOL/L — SIGNIFICANT CHANGE UP (ref 135–145)
WBC # BLD: 5 K/UL — SIGNIFICANT CHANGE UP (ref 4.8–10.8)
WBC # FLD AUTO: 5 K/UL — SIGNIFICANT CHANGE UP (ref 4.8–10.8)

## 2018-03-29 PROCEDURE — 99223 1ST HOSP IP/OBS HIGH 75: CPT

## 2018-03-29 PROCEDURE — 99233 SBSQ HOSP IP/OBS HIGH 50: CPT

## 2018-03-29 RX ORDER — FUROSEMIDE 40 MG
40 TABLET ORAL DAILY
Qty: 0 | Refills: 0 | Status: DISCONTINUED | OUTPATIENT
Start: 2018-03-29 | End: 2018-03-30

## 2018-03-29 RX ADMIN — CARVEDILOL PHOSPHATE 3.12 MILLIGRAM(S): 80 CAPSULE, EXTENDED RELEASE ORAL at 05:22

## 2018-03-29 RX ADMIN — PANTOPRAZOLE SODIUM 40 MILLIGRAM(S): 20 TABLET, DELAYED RELEASE ORAL at 05:21

## 2018-03-29 RX ADMIN — PANTOPRAZOLE SODIUM 40 MILLIGRAM(S): 20 TABLET, DELAYED RELEASE ORAL at 17:04

## 2018-03-29 RX ADMIN — CARVEDILOL PHOSPHATE 3.12 MILLIGRAM(S): 80 CAPSULE, EXTENDED RELEASE ORAL at 17:04

## 2018-03-29 NOTE — CONSULT NOTE ADULT - SUBJECTIVE AND OBJECTIVE BOX
Cardiology Consult    CHIEF COMPLAINT:Dizziness    HISTORY OF PRESENT ILLNESS: TONY FORTE is a 82y old female with a history of coronary disease,cirrhosis,chronic systolic CHF,thrombocytopenia,essential hypertension,hyperlipidemia,renal insufficiency,hyperparathyroidism who presents with dizziness with no LOC,chest pain,shortness of breath or palpitations noted.She has not been eating well with decreased appetite recently.She had dark stools but was taking Pepto Bismol.She had a recent cardiac cath with a total mid LAD occlusion and attempted PTCA with perforation and a small pericardial effusion noted.She stabilized and was discharged in satisfactory condition.    PROBLEM LIST:    PAST MEDICAL HISTORY:  Vitamin D deficiency  Thrombocytopenia  Renal insufficiency  Portal hypertension  Non-alcoholic fatty liver disease  Hyperparathyroidism  HLD (hyperlipidemia)  Elevated TSH  HTN (hypertension)  Aortic stenosis  Cirrhosis of liver  NAFLD (nonalcoholic fatty liver disease)  White coat syndrome without diagnosis of hypertension    PAST SURGICAL HISTORY:  S/P coronary angiogram  S/P cataract surgery  S/P cholecystectomy  S/P ANURAG-BSO  History of appendectomy    MEDICATIONS  (STANDING):  carvedilol 3.125 milliGRAM(s) Oral every 12 hours  pantoprazole  Injectable 40 milliGRAM(s) IV Push every 12 hours    MEDICATIONS  (PRN):    ALLERGIES:  No Known Allergies    SOCIAL HISTORY:  Tobacco: quit in   Alcohol: socially  Drugs:     FAMILY HISTORY:  No pertinent family history in first degree relatives    REVIEW OF SYSTEMS:  Constitutional: no fatigue, no fevers/chills, no weight change  HEENT: no visual disturbances, no hearing loss  Cardiac: no chest pain, no palpitations, no orthopnea, no PND  Respiratory: no shortness of breath, no cough  GI: no abdominal pain, had dark stool, no N/V, had diarrhea  Urological: no dysuria, no hematuria  Hematological: no easy bruising or bleeding  Musculoskeletal: no joint pain, no back pain  Neurological: no headaches, no syncope,had dizziness  Psychiatric: no anxiety, no depression  Skin: no rashes    PHYSICAL EXAM:    T(C): 36.7 (18 @ 05:07), Max: 36.7 (18 @ 19:42)  T(F): 98.1 (18 @ 05:07), Max: 98.1 (18 @ 19:42)  HR: 66 (18 @ 05:07) (64 - 80)  BP: 124/60 (18 @ 05:07) (124/60 - 154/74)  RR: 16 (18 @ 05:07) (16 - 19)  SpO2: 96% (18 @ 05:07) (96% - 99%)  Wt(kg): --  Telemetry: sinus rhythm  General: comfortable, in NAD  HEENT: EOMI, normocephalic, atraumatic  Neck: no JVD, no carotid bruits  Heart: +S1S2, RRR, 1/6 systolic murmur at the LSB, no rubs, no gallops  Lungs: clear to auscultation bilaterally, no wheezes, no rales, no rhonchi  Abdomen: soft, non-tender, non-distended, + bowel sounds  Extremities: no clubbing, no cyanosis, no edema  Vascular: 2+ dorsalis pedis pulses bilaterally  Neuro: A&O x3    EKG: sinus rhythm,RBBB    LABS:    Troponin T, Serum: <0.01 ng/mL ( @ 16:45)                            11.6   5.0   )-----------( 107      ( 29 Mar 2018 05:51 )             34.4         145  |  105  |  14.0  ----------------------------<  110  3.6   |  30.0<H>  |  0.82    Ca    8.7      29 Mar 2018 05:51  Mg     1.6         TPro  5.7<L>  /  Alb  3.1<L>  /  TBili  1.8  /  DBili  x   /  AST  43<H>  /  ALT  17  /  AlkPhos  84      PT/INR - ( 28 Mar 2018 14:25 )   PT: 14.2 sec;   INR: 1.28 ratio         PTT - ( 28 Mar 2018 14:25 )  PTT:32.0 sec  RADIOLOGY & ADDITIONAL TESTS:    ASSESSMENT:  TONY FORTE is a 82y old female with a history of coronary disease,chronic systolic CHF,cirrhosis,thrombocytopenia,essential hypertension,hyperlipidemia,hyperparathyroidism,renal insufficiency who presented with dizziness.Her BP is stable with no orthostatic changes,Hb 11.6,she is not dizzy when upright this AM.    Please permit me to suggest the followin. She is cleared for discharge from a cardiac standpoint  2.Arrangements are being made for admission to [facility] as an outpatient for follow up treatment of her coronary disease  3.She was encouraged to increase her po intake

## 2018-03-29 NOTE — PROGRESS NOTE ADULT - SUBJECTIVE AND OBJECTIVE BOX
CC: Diarrhea, dark stool , quiaic positive , setting of non alcoholic steatohepatitis with cirrhosis.  CAD recent attempted heart cath  HPI:  82 years old female with PMH of CAD, HFrEF, Liver Cirrhosis (NAFLD) and Thrombocytopenia comes with black stools. As per patient, she is having loose bowel movement for few days and so she started taking Pepto-Bismol. 3 days ago, she noticed black stools - denies conner bleeding. She is feeling very weak and lightheaded at times. Denies abdominal pain, nausea, vomiting, fever or change in her diet. No history of GI Bleed in past.   She recently had Cardiac Cath (3/13/18) which was complicated by LAD perforation. Denies chest pain, palpitations or shortness of breath. She is on Aspirin.   No OTC NSAIDs. (28 Mar 2018 21:26)    REVIEW OF SYSTEMS:    Patient denied fever, chills, abdominal pain, nausea, vomiting, cough, shortness of breath, chest pain or palpitations    Vital Signs Last 24 Hrs  T(C): 36.7 (29 Mar 2018 11:39), Max: 36.7 (28 Mar 2018 19:42)  T(F): 98 (29 Mar 2018 11:39), Max: 98.1 (28 Mar 2018 19:42)  HR: 80 (29 Mar 2018 11:39) (64 - 80)  BP: 148/60 (29 Mar 2018 11:39) (124/60 - 148/65)  BP(mean): --  RR: 18 (29 Mar 2018 11:39) (16 - 19)  SpO2: 97% (29 Mar 2018 11:39) (96% - 99%)I&O's Summary    PHYSICAL EXAM:  GENERAL: NAD,   HEENT: PERRL, +EOMI, anicteric, no [place]  NECK: Supple, No JVD   CHEST/LUNG: CTA bilaterally; Normal effort  HEART: S1S2 Normal intensity, no murmurs, gallops or rubs noted  ABDOMEN: Soft, BS Normoactive, NT, ND, no HSM noted  EXTREMITIES:  Pedal  edema noted. Limited mobility  SKIN: No rashes or lesions noted  NEURO: A&Ox3, no focal deficits noted, CN II-XII intact  PSYCH: Depressed mood and affect; insight/judgement appropriate  LABS:                        11.6   5.0   )-----------( 107      ( 29 Mar 2018 05:51 )             34.4     03-29    145  |  105  |  14.0  ----------------------------<  110  3.6   |  30.0<H>  |  0.82    Ca    8.7      29 Mar 2018 05:51  Mg     1.6     03-29    TPro  5.7<L>  /  Alb  3.1<L>  /  TBili  1.8  /  DBili  x   /  AST  43<H>  /  ALT  17  /  AlkPhos  84  03-28    PT/INR - ( 28 Mar 2018 14:25 )   PT: 14.2 sec;   INR: 1.28 ratio         PTT - ( 28 Mar 2018 14:25 )  PTT:32.0 sec    RADIOLOGY & ADDITIONAL TESTS:    MEDICATIONS:  MEDICATIONS  (STANDING):  carvedilol 3.125 milliGRAM(s) Oral every 12 hours  pantoprazole  Injectable 40 milliGRAM(s) IV Push every 12 hours    MEDICATIONS  (PRN):

## 2018-03-29 NOTE — PROGRESS NOTE ADULT - ASSESSMENT
82 years old female with PMH of CAD, HFrEF, Liver Cirrhosis (NAFLD) and Thrombocytopenia comes with black stools. As per patient, she is having loose bowel movement for few days and so she started taking Pepto-Bismol. 3 days ago, she noticed black stools - denies conner bleeding. She is feeling very weak and lightheaded at times. Denies abdominal pain, nausea, vomiting, fever or change in her diet. No history of GI Bleed in past.   She recently had Cardiac Cath (3/13/18) which was complicated by LAD perforation. Denies chest pain, palpitations or shortness of breath. She is on Aspirin.   No OTC NSAIDs.     1) GI Bleed  GI following . No overt GI bleed . To follow up with dr Pimentel in office.   - Protonix 40 mg q 12 hours  - Clear liquid diet  - Hold Aspirin for now    2) Liver Cirrhosis  - Likely secondary to NAFLD  - No history of varices or variceal bleed  - Follows Dr. Pimentel    3) Thrombocytopenia  - Chronic. Likely secondary to Liver Cirrhosis.    4) CAD  Seen by cardiology dr Miller . To follow up at [facility] for CAD workup and mgt  - Continue Coreg with holding parameters    5) HFrEF  - Lasix 40mg po daily   - Not on ACEI due to hypotension/renal insufficiency  - Continue Coreg

## 2018-03-29 NOTE — CONSULT NOTE ADULT - SUBJECTIVE AND OBJECTIVE BOX
Patient is a 82y old  Female who presents with a chief complaint of Black stools (28 Mar 2018 21:26)      HPI:  82 years old female with PMH of CAD, HFrEF, Liver Cirrhosis (NAFLD) and Thrombocytopenia comes with dizziness, diarrhea, dark stools. Patient had cardiac cath 3/13 with perforation of the LAD. Cardiology note reviewed. pt to have further work up at [facility]. Patient has no nausea, no vomiting, no abdominal pain. No hx of PUD. Has cirrhosis secondary to NAFLD as per H+P. Has thrombocytopenia. Schedule to see Dr Pimentel in may. For three days she has moderate diarrhea. 1-2 loose stools after eating. No BRBPR, but had dark stool. Has been taking pepto bismol qd X 3 days.  No fevers. hemoglobin stable at 11.6    REVIEW OF SYSTEMS:  Constitutional: No fever, weight loss or fatigue  ENMT:  No difficulty hearing, tinnitus, vertigo; No sinus or throat pain  Respiratory: No cough, wheezing, chills or hemoptysis  Cardiovascular: No chest pain, palpitations, dizziness or leg swelling  Gastrointestinal: No abdominal or epigastric pain. No nausea, vomiting or hematemesis; + diarrhea or constipation. No melena or hematochezia.  Skin: No itching, burning, rashes or lesions   Musculoskeletal: No joint pain or swelling; No muscle, back or extremity pain    PAST MEDICAL & SURGICAL HISTORY:  Vitamin D deficiency  Thrombocytopenia  Renal insufficiency  Portal hypertension  Non-alcoholic fatty liver disease  Hyperparathyroidism  HLD (hyperlipidemia)  Elevated TSH  HTN (hypertension)  Aortic stenosis  Cirrhosis of liver  NAFLD (nonalcoholic fatty liver disease)  White coat syndrome without diagnosis of hypertension  S/P coronary angiogram: feb 2018  S/P cataract surgery  S/P cholecystectomy  S/P ANURAG-BSO  History of appendectomy      FAMILY HISTORY:  No pertinent family history in first degree relatives      SOCIAL HISTORY:  Smoking Status: [ ] Current, [ ] Former, [ ] Never  Pack Years:  [  ] EtOH-no  [  ] IVDA-no    MEDICATIONS:  MEDICATIONS  (STANDING):  carvedilol 3.125 milliGRAM(s) Oral every 12 hours  pantoprazole  Injectable 40 milliGRAM(s) IV Push every 12 hours    MEDICATIONS  (PRN):      Allergies    No Known Allergies    Intolerances        Vital Signs Last 24 Hrs  T(C): 36.7 (29 Mar 2018 05:07), Max: 36.7 (28 Mar 2018 19:42)  T(F): 98.1 (29 Mar 2018 05:07), Max: 98.1 (28 Mar 2018 19:42)  HR: 66 (29 Mar 2018 05:07) (64 - 80)  BP: 124/60 (29 Mar 2018 05:07) (124/60 - 154/74)  BP(mean): --  RR: 16 (29 Mar 2018 05:07) (16 - 19)  SpO2: 96% (29 Mar 2018 05:07) (96% - 99%)        PHYSICAL EXAM:    General: Well developed; well nourished; in no acute distress  HEENT: MMM, conjunctiva and sclera clear  L- CTA  H- RRR  Gastrointestinal: Soft, non-tender non-distended; Normal bowel sounds; No rebound or guarding  Extremities: Normal range of motion, No clubbing, cyanosis or edema  Neurological: Alert and oriented x3  Skin: Warm and dry. No obvious rash  rectal - empty vault with 2 flecks of dark stool      LABS:                        11.6   5.0   )-----------( 107      ( 29 Mar 2018 05:51 )             34.4     29 Mar 2018 05:51    145    |  105    |  14.0   ----------------------------<  110    3.6     |  30.0   |  0.82     Ca    8.7        29 Mar 2018 05:51  Mg     1.6       29 Mar 2018 05:51    TPro  5.7    /  Alb  3.1    /  TBili  1.8    /  DBili  x      /  AST  43     /  ALT  17     /  AlkPhos  84     / Amylase x      /Lipase x      28 Mar 2018 14:25              RADIOLOGY & ADDITIONAL STUDIES:

## 2018-03-29 NOTE — CONSULT NOTE ADULT - PROBLEM SELECTOR RECOMMENDATION 9
Patient with recent hospitalization  - check stool studies and R/O C diff  - low fiber diet  - Hgb stable- does not appear to be having overt GI bleeding ( guiac +)  _ Does not need urgent EGD. may F/U with Margarito as scheduled

## 2018-03-30 ENCOUNTER — TRANSCRIPTION ENCOUNTER (OUTPATIENT)
Age: 83
End: 2018-03-30

## 2018-03-30 VITALS — WEIGHT: 154.1 LBS

## 2018-03-30 DIAGNOSIS — R19.5 OTHER FECAL ABNORMALITIES: ICD-10-CM

## 2018-03-30 LAB
ANION GAP SERPL CALC-SCNC: 7 MMOL/L — SIGNIFICANT CHANGE UP (ref 5–17)
BUN SERPL-MCNC: 14 MG/DL — SIGNIFICANT CHANGE UP (ref 8–20)
CALCIUM SERPL-MCNC: 8.8 MG/DL — SIGNIFICANT CHANGE UP (ref 8.6–10.2)
CHLORIDE SERPL-SCNC: 104 MMOL/L — SIGNIFICANT CHANGE UP (ref 98–107)
CO2 SERPL-SCNC: 31 MMOL/L — HIGH (ref 22–29)
CREAT SERPL-MCNC: 0.96 MG/DL — SIGNIFICANT CHANGE UP (ref 0.5–1.3)
GLUCOSE SERPL-MCNC: 106 MG/DL — SIGNIFICANT CHANGE UP (ref 70–115)
HCT VFR BLD CALC: 32.8 % — LOW (ref 37–47)
HGB BLD-MCNC: 10.6 G/DL — LOW (ref 12–16)
MCHC RBC-ENTMCNC: 31.2 PG — HIGH (ref 27–31)
MCHC RBC-ENTMCNC: 32.3 G/DL — SIGNIFICANT CHANGE UP (ref 32–36)
MCV RBC AUTO: 96.5 FL — SIGNIFICANT CHANGE UP (ref 81–99)
PLATELET # BLD AUTO: 79 K/UL — LOW (ref 150–400)
POTASSIUM SERPL-MCNC: 3.6 MMOL/L — SIGNIFICANT CHANGE UP (ref 3.5–5.3)
POTASSIUM SERPL-SCNC: 3.6 MMOL/L — SIGNIFICANT CHANGE UP (ref 3.5–5.3)
RBC # BLD: 3.4 M/UL — LOW (ref 4.4–5.2)
RBC # FLD: 15.5 % — SIGNIFICANT CHANGE UP (ref 11–15.6)
SODIUM SERPL-SCNC: 142 MMOL/L — SIGNIFICANT CHANGE UP (ref 135–145)
WBC # BLD: 3 K/UL — LOW (ref 4.8–10.8)
WBC # FLD AUTO: 3 K/UL — LOW (ref 4.8–10.8)

## 2018-03-30 PROCEDURE — 86850 RBC ANTIBODY SCREEN: CPT

## 2018-03-30 PROCEDURE — 83036 HEMOGLOBIN GLYCOSYLATED A1C: CPT

## 2018-03-30 PROCEDURE — 82272 OCCULT BLD FECES 1-3 TESTS: CPT

## 2018-03-30 PROCEDURE — 99232 SBSQ HOSP IP/OBS MODERATE 35: CPT

## 2018-03-30 PROCEDURE — 99285 EMERGENCY DEPT VISIT HI MDM: CPT | Mod: 25

## 2018-03-30 PROCEDURE — 84484 ASSAY OF TROPONIN QUANT: CPT

## 2018-03-30 PROCEDURE — 85027 COMPLETE CBC AUTOMATED: CPT

## 2018-03-30 PROCEDURE — 85018 HEMOGLOBIN: CPT

## 2018-03-30 PROCEDURE — 85610 PROTHROMBIN TIME: CPT

## 2018-03-30 PROCEDURE — 82550 ASSAY OF CK (CPK): CPT

## 2018-03-30 PROCEDURE — 96374 THER/PROPH/DIAG INJ IV PUSH: CPT

## 2018-03-30 PROCEDURE — 85730 THROMBOPLASTIN TIME PARTIAL: CPT

## 2018-03-30 PROCEDURE — 80048 BASIC METABOLIC PNL TOTAL CA: CPT

## 2018-03-30 PROCEDURE — 93005 ELECTROCARDIOGRAM TRACING: CPT

## 2018-03-30 PROCEDURE — 86900 BLOOD TYPING SEROLOGIC ABO: CPT

## 2018-03-30 PROCEDURE — 86901 BLOOD TYPING SEROLOGIC RH(D): CPT

## 2018-03-30 PROCEDURE — 36415 COLL VENOUS BLD VENIPUNCTURE: CPT

## 2018-03-30 PROCEDURE — 80053 COMPREHEN METABOLIC PANEL: CPT

## 2018-03-30 PROCEDURE — 99239 HOSP IP/OBS DSCHRG MGMT >30: CPT

## 2018-03-30 PROCEDURE — 83735 ASSAY OF MAGNESIUM: CPT

## 2018-03-30 RX ADMIN — CARVEDILOL PHOSPHATE 3.12 MILLIGRAM(S): 80 CAPSULE, EXTENDED RELEASE ORAL at 05:28

## 2018-03-30 RX ADMIN — PANTOPRAZOLE SODIUM 40 MILLIGRAM(S): 20 TABLET, DELAYED RELEASE ORAL at 05:29

## 2018-03-30 RX ADMIN — Medication 40 MILLIGRAM(S): at 05:28

## 2018-03-30 NOTE — PROGRESS NOTE ADULT - SUBJECTIVE AND OBJECTIVE BOX
Pt seen and examined f/u diarrhea and hx of cirrhosis  This morning patient without complaints. She had a formed brown BM this morning off peptobismol. Denies abdominal pain. Toleratine diet. Hemoglobin down slightly but no evidence of ovet GI bleed, just OB positive stool which can be further evaluated as outpatient when cardiac status  more stable.    REVIEW OF SYSTEMS:    CONSTITUTIONAL: No fever, weight loss, or fatigue  EYES: No eye pain, visual disturbances, or discharge  ENMT:  No difficulty hearing, tinnitus, vertigo; No sinus or throat pain  RESPIRATORY: No cough, wheezing, chills or hemoptysis; No shortness of breath  CARDIOVASCULAR: No chest pain, palpitations, dizziness, or leg swelling  GASTROINTESTINAL: No abdominal or epigastric pain. No nausea, vomiting, or hematemesis; No diarrhea or constipation. No melena or hematochezia.    MEDICATIONS:  MEDICATIONS  (STANDING):  carvedilol 3.125 milliGRAM(s) Oral every 12 hours  furosemide    Tablet 40 milliGRAM(s) Oral daily  pantoprazole  Injectable 40 milliGRAM(s) IV Push every 12 hours    MEDICATIONS  (PRN):      Allergies    No Known Allergies    Intolerances        Vital Signs Last 24 Hrs  T(C): 36.9 (30 Mar 2018 05:25), Max: 36.9 (30 Mar 2018 05:25)  T(F): 98.4 (30 Mar 2018 05:25), Max: 98.4 (30 Mar 2018 05:25)  HR: 69 (30 Mar 2018 05:25) (69 - 80)  BP: 139/59 (30 Mar 2018 05:25) (132/60 - 148/60)  BP(mean): --  RR: 20 (30 Mar 2018 05:25) (18 - 20)  SpO2: 98% (30 Mar 2018 05:25) (97% - 98%)      PHYSICAL EXAM:    General: Well developed; well nourished; in no acute distress  HEENT: MMM, conjunctiva and sclera clear  Gastrointestinal:Abdomen: Soft non-tender non-distended; Normal bowel sounds; No hepatosplenomegaly  Extremities: no cyanosis, clubbing or edema.  Skin: Warm and dry. No obvious rash    LABS:      CBC Full  -  ( 30 Mar 2018 05:59 )  WBC Count : 3.0 K/uL  Hemoglobin : 10.6 g/dL  Hematocrit : 32.8 %  Platelet Count - Automated : 79 K/uL  Mean Cell Volume : 96.5 fl  Mean Cell Hemoglobin : 31.2 pg  Mean Cell Hemoglobin Concentration : 32.3 g/dL  Auto Neutrophil # : x  Auto Lymphocyte # : x  Auto Monocyte # : x  Auto Eosinophil # : x  Auto Basophil # : x  Auto Neutrophil % : x  Auto Lymphocyte % : x  Auto Monocyte % : x  Auto Eosinophil % : x  Auto Basophil % : x    03-30    142  |  104  |  14.0  ----------------------------<  106  3.6   |  31.0<H>  |  0.96    Ca    8.8      30 Mar 2018 05:59  Mg     1.6     03-29    TPro  5.7<L>  /  Alb  3.1<L>  /  TBili  1.8  /  DBili  x   /  AST  43<H>  /  ALT  17  /  AlkPhos  84  03-28    PT/INR - ( 28 Mar 2018 14:25 )   PT: 14.2 sec;   INR: 1.28 ratio         PTT - ( 28 Mar 2018 14:25 )  PTT:32.0 sec

## 2018-03-30 NOTE — DISCHARGE NOTE ADULT - CARE PROVIDER_API CALL
Alexx Robles), Cardiovascular Disease; Internal Medicine; Nuclear Cardiology  [address] [place] [address]  Phone: [phone]  Fax: [phone]    Blanco Camarena), Gastroenterology; Internal Medicine  [address] [place] [address]  Phone: [phone]  Fax: [phone]

## 2018-03-30 NOTE — DISCHARGE NOTE ADULT - PATIENT PORTAL LINK FT
You can access the [facility], offered by [facility], by registering with the following website: [email or web address] [facility]

## 2018-03-30 NOTE — DISCHARGE NOTE ADULT - CARE PLAN
Principal Discharge DX:	Occult blood in stools  Goal:	ADLs  Assessment and plan of treatment:	Follow up with GI in 1-2 weeks  Follow up with cardiology in 2-4 weeks. To arrange to meet with dr Aguayo at [facility] for Workup CAD and possible heart cath.   Up ad kahlil  heart healthy diet  Secondary Diagnosis:	Other cirrhosis of liver  Secondary Diagnosis:	Rectal bleeding  Secondary Diagnosis:	Essential hypertension  Secondary Diagnosis:	Diarrhea, unspecified type  Secondary Diagnosis:	Non-alcoholic fatty liver disease

## 2018-03-30 NOTE — DISCHARGE NOTE ADULT - MEDICATION SUMMARY - MEDICATIONS TO TAKE
I will START or STAY ON the medications listed below when I get home from the hospital:    aspirin 81 mg oral delayed release tablet  -- 1 tab(s) by mouth once a day  -- Indication: For CAD    Coreg 3.125 mg oral tablet  -- 1 tab(s) by mouth 2 times a day  -- Indication: For CAD    Lasix 20 mg oral tablet  -- 1 tab(s) by mouth 2 times a day  -- Indication: For Cirrhosis

## 2018-03-30 NOTE — PROGRESS NOTE ADULT - PROBLEM SELECTOR PLAN 3
high incidence fo false positive in this setting. No plans for endoscopic evaluation at this time. F/U with Dr. Pimentel as outpatient. Will see only prn your request.

## 2018-03-30 NOTE — DISCHARGE NOTE ADULT - HOSPITAL COURSE
82 years old female with PMH of CAD, HFrEF, Liver Cirrhosis (NAFLD) and Thrombocytopenia comes with black stools. As per patient, she is having loose bowel movement for few days and so she started taking Pepto-Bismol. 3 days ago, she noticed black stools - denies conner bleeding. She is feeling very weak and lightheaded at times. Denies abdominal pain, nausea, vomiting, fever or change in her diet. No history of GI Bleed in past.   She recently had Cardiac Cath (3/13/18) which was complicated by LAD perforation. Denies chest pain, palpitations or shortness of breath. She is on Aspirin.   No OTC NSAIDs.      GI Bleed  GI following . No overt GI bleed . To follow up with dr Pimentel in office.   - Protonix 40 mg q 12 hours  - Clear liquid diet  - Hold Aspirin for now    Liver Cirrhosis  -- Follows Dr. Pimentel    Thrombocytopenia  - Chronic. Likely secondary to Liver Cirrhosis.     CAD  Seen by cardiology dr Miller . To follow up at [facility] with dr Aguayo for CAD workup and mgt  - Continue Coreg      HFrEF  - Lasix 40mg po daily   - Not on ACEI due to hypotension/renal insufficiency  - Continue Coreg

## 2018-03-30 NOTE — DISCHARGE NOTE ADULT - PLAN OF CARE
ADLs Follow up with GI in 1-2 weeks  Follow up with cardiology in 2-4 weeks. To arrange to meet with dr Aguayo at [facility] for Workup CAD and possible heart cath.   Up ad kahlil  heart healthy diet

## 2018-03-30 NOTE — DISCHARGE NOTE ADULT - SECONDARY DIAGNOSIS.
Other cirrhosis of liver Rectal bleeding Essential hypertension Diarrhea, unspecified type Non-alcoholic fatty liver disease

## 2018-03-30 NOTE — DISCHARGE NOTE ADULT - CARE PROVIDERS DIRECT ADDRESSES
[email or web address]

## 2018-03-30 NOTE — PROGRESS NOTE ADULT - SUBJECTIVE AND OBJECTIVE BOX
Cardiology Follow-up    TONY FORTE was seen and examined. No events overnight. The patient denies any chest pain, SOB, palpitations, orthopnea, PND or abdominal pain.She was kept overnight as she had diarrhea.    PROBLEM LIST:  Other cirrhosis of liver  Diarrhea, unspecified type    PAST MEDICAL HISTORY:  Vitamin D deficiency  Thrombocytopenia  Renal insufficiency  Portal hypertension  Non-alcoholic fatty liver disease  Hyperparathyroidism  HLD (hyperlipidemia)  Elevated TSH  HTN (hypertension)  Aortic stenosis  Cirrhosis of liver  NAFLD (nonalcoholic fatty liver disease)  White coat syndrome without diagnosis of hypertension    PAST SURGICAL HISTORY:  S/P coronary angiogram  S/P cataract surgery  S/P cholecystectomy  S/P ANURAG-BSO  History of appendectomy    MEDICATIONS  (STANDING):  carvedilol 3.125 milliGRAM(s) Oral every 12 hours  furosemide    Tablet 40 milliGRAM(s) Oral daily  pantoprazole  Injectable 40 milliGRAM(s) IV Push every 12 hours    MEDICATIONS  (PRN):    ALLERGIES:  No Known Allergies    PHYSICAL EXAM:  T(C): 36.9 (18 @ 05:25), Max: 36.9 (18 @ 05:25)  T(F): 98.4 (18 @ 05:25), Max: 98.4 (18 @ 05:25)  HR: 69 (18 @ 05:25) (69 - 80)  BP: 139/59 (18 @ 05:25) (132/60 - 148/60)  RR: 20 (18 @ 05:25) (18 - 20)  SpO2: 98% (18 @ 05:25) (97% - 98%)  Wt(kg): --  I&O's Summary    Telemetry: sinus rhythm  General: comfortable, in NAD  Heart: +S1S2, RRR, 1/6 systolic murmur at the LSB, no rubs, no gallops  Lungs: clear to auscultation bilaterally, no wheezes, no rales, no rhonchi  Abdomen: soft, non-tender, non-distended, + bowel sounds  Extremities: no clubbing, no cyanosis, no edema  Neuro: A&O x3    LABS:    Troponin T, Serum: <0.01 ng/mL ( @ 16:45)                            10.6   3.0   )-----------( 79       ( 30 Mar 2018 05:59 )             32.8     03    142  |  104  |  14.0  ----------------------------<  106  3.6   |  31.0<H>  |  0.96    Ca    8.8      30 Mar 2018 05:59  Mg     1.6         TPro  5.7<L>  /  Alb  3.1<L>  /  TBili  1.8  /  DBili  x   /  AST  43<H>  /  ALT  17  /  AlkPhos  84      PT/INR - ( 28 Mar 2018 14:25 )   PT: 14.2 sec;   INR: 1.28 ratio         PTT - ( 28 Mar 2018 14:25 )  PTT:32.0 sec  RADIOLOGY & ADDITIONAL TESTS:    ASSESSMENT:  TONY FORTE is a 82y old female with a history of essential hypertension,coronary disease,s/p recent LAD perforation,cirrhosis,thrombocytopenia,hyperlipidemia,renal insufficiency,hyperparathyroidism who presented with dizziness.Her dizziness and diarrhea are improved.    Please permit me to suggest the followin. She is cleared for discharge from a cardiac standpoint  2.Arrangements are made for follow up with  at [facility] as an outpatient  3.Continuation of current meds

## 2018-04-02 ENCOUNTER — CLINICAL ADVICE (OUTPATIENT)
Age: 83
End: 2018-04-02

## 2018-04-02 RX ORDER — METOPROLOL SUCCINATE 25 MG/1
25 TABLET, EXTENDED RELEASE ORAL DAILY
Qty: 30 | Refills: 2 | Status: DISCONTINUED | COMMUNITY
Start: 2017-11-27 | End: 2018-04-02

## 2018-04-02 RX ORDER — ACETAMINOPHEN 500 MG/1
500 TABLET ORAL EVERY 8 HOURS
Qty: 60 | Refills: 0 | Status: DISCONTINUED | COMMUNITY
Start: 2018-03-12 | End: 2018-04-02

## 2018-04-19 ENCOUNTER — APPOINTMENT (OUTPATIENT)
Dept: FAMILY MEDICINE | Facility: CLINIC | Age: 83
End: 2018-04-19

## 2018-05-22 ENCOUNTER — APPOINTMENT (OUTPATIENT)
Dept: GASTROENTEROLOGY | Facility: CLINIC | Age: 83
End: 2018-05-22
Payer: MEDICARE

## 2018-05-22 VITALS
WEIGHT: 157 LBS | DIASTOLIC BLOOD PRESSURE: 58 MMHG | RESPIRATION RATE: 16 BRPM | BODY MASS INDEX: 27.82 KG/M2 | HEART RATE: 81 BPM | SYSTOLIC BLOOD PRESSURE: 133 MMHG | HEIGHT: 63 IN

## 2018-05-22 PROCEDURE — 99214 OFFICE O/P EST MOD 30 MIN: CPT

## 2018-05-22 RX ORDER — ATORVASTATIN CALCIUM 20 MG/1
20 TABLET, FILM COATED ORAL
Refills: 0 | Status: ACTIVE | COMMUNITY

## 2018-05-22 RX ORDER — CLOPIDOGREL BISULFATE 75 MG/1
75 TABLET, FILM COATED ORAL
Refills: 0 | Status: ACTIVE | COMMUNITY

## 2018-05-22 RX ORDER — METOPROLOL SUCCINATE 25 MG/1
25 TABLET, EXTENDED RELEASE ORAL
Refills: 0 | Status: ACTIVE | COMMUNITY
Start: 1900-01-01 | End: 1900-01-01

## 2018-06-14 ENCOUNTER — MEDICATION RENEWAL (OUTPATIENT)
Age: 83
End: 2018-06-14

## 2018-06-19 ENCOUNTER — APPOINTMENT (OUTPATIENT)
Dept: GASTROENTEROLOGY | Facility: CLINIC | Age: 83
End: 2018-06-19
Payer: MEDICARE

## 2018-06-19 VITALS
BODY MASS INDEX: 27.82 KG/M2 | RESPIRATION RATE: 15 BRPM | HEART RATE: 83 BPM | SYSTOLIC BLOOD PRESSURE: 158 MMHG | WEIGHT: 157 LBS | DIASTOLIC BLOOD PRESSURE: 70 MMHG | HEIGHT: 63 IN | OXYGEN SATURATION: 98 %

## 2018-06-19 PROCEDURE — 99214 OFFICE O/P EST MOD 30 MIN: CPT

## 2018-06-22 LAB
ALBUMIN SERPL ELPH-MCNC: 3.2 G/DL
ALP BLD-CCNC: 154 U/L
ALT SERPL-CCNC: 17 U/L
ANION GAP SERPL CALC-SCNC: 12 MMOL/L
AST SERPL-CCNC: 39 U/L
BILIRUB SERPL-MCNC: 1.3 MG/DL
BUN SERPL-MCNC: 17 MG/DL
CALCIUM SERPL-MCNC: 8.7 MG/DL
CHLORIDE SERPL-SCNC: 104 MMOL/L
CO2 SERPL-SCNC: 27 MMOL/L
CREAT SERPL-MCNC: 1.09 MG/DL
GLUCOSE SERPL-MCNC: 106 MG/DL
POTASSIUM SERPL-SCNC: 4.1 MMOL/L
PROT SERPL-MCNC: 5.8 G/DL
SODIUM SERPL-SCNC: 143 MMOL/L

## 2018-06-25 ENCOUNTER — RESULT REVIEW (OUTPATIENT)
Age: 83
End: 2018-06-25

## 2018-07-10 ENCOUNTER — APPOINTMENT (OUTPATIENT)
Dept: GASTROENTEROLOGY | Facility: CLINIC | Age: 83
End: 2018-07-10
Payer: MEDICARE

## 2018-07-10 VITALS
DIASTOLIC BLOOD PRESSURE: 60 MMHG | SYSTOLIC BLOOD PRESSURE: 150 MMHG | BODY MASS INDEX: 27.82 KG/M2 | HEART RATE: 84 BPM | OXYGEN SATURATION: 96 % | HEIGHT: 63 IN | WEIGHT: 157 LBS | RESPIRATION RATE: 16 BRPM

## 2018-07-10 PROCEDURE — 99214 OFFICE O/P EST MOD 30 MIN: CPT

## 2018-07-13 LAB
ALBUMIN SERPL ELPH-MCNC: 3 G/DL
ALP BLD-CCNC: 154 U/L
ALT SERPL-CCNC: 15 U/L
ANION GAP SERPL CALC-SCNC: 13 MMOL/L
AST SERPL-CCNC: 35 U/L
BILIRUB SERPL-MCNC: 1.4 MG/DL
BUN SERPL-MCNC: 14 MG/DL
CALCIUM SERPL-MCNC: 8.6 MG/DL
CHLORIDE SERPL-SCNC: 105 MMOL/L
CO2 SERPL-SCNC: 26 MMOL/L
CREAT SERPL-MCNC: 1.08 MG/DL
GLUCOSE SERPL-MCNC: 115 MG/DL
POTASSIUM SERPL-SCNC: 4.4 MMOL/L
PROT SERPL-MCNC: 5.2 G/DL
SODIUM SERPL-SCNC: 144 MMOL/L

## 2018-07-18 ENCOUNTER — RX RENEWAL (OUTPATIENT)
Age: 83
End: 2018-07-18

## 2018-07-23 ENCOUNTER — RX RENEWAL (OUTPATIENT)
Age: 83
End: 2018-07-23

## 2018-08-14 ENCOUNTER — APPOINTMENT (OUTPATIENT)
Dept: GASTROENTEROLOGY | Facility: CLINIC | Age: 83
End: 2018-08-14
Payer: MEDICARE

## 2018-08-14 VITALS
BODY MASS INDEX: 27.29 KG/M2 | SYSTOLIC BLOOD PRESSURE: 145 MMHG | HEIGHT: 63 IN | OXYGEN SATURATION: 97 % | WEIGHT: 154 LBS | DIASTOLIC BLOOD PRESSURE: 84 MMHG | HEART RATE: 60 BPM | RESPIRATION RATE: 16 BRPM

## 2018-08-14 PROBLEM — K76.0 FATTY (CHANGE OF) LIVER, NOT ELSEWHERE CLASSIFIED: Chronic | Status: ACTIVE | Noted: 2018-03-06

## 2018-08-14 PROBLEM — K74.60 UNSPECIFIED CIRRHOSIS OF LIVER: Chronic | Status: ACTIVE | Noted: 2018-02-13

## 2018-08-14 PROBLEM — I35.0 NONRHEUMATIC AORTIC (VALVE) STENOSIS: Chronic | Status: ACTIVE | Noted: 2018-03-06

## 2018-08-14 PROBLEM — D69.6 THROMBOCYTOPENIA, UNSPECIFIED: Chronic | Status: ACTIVE | Noted: 2018-03-06

## 2018-08-14 PROBLEM — R94.6 ABNORMAL RESULTS OF THYROID FUNCTION STUDIES: Chronic | Status: ACTIVE | Noted: 2018-03-06

## 2018-08-14 PROBLEM — K76.6 PORTAL HYPERTENSION: Chronic | Status: ACTIVE | Noted: 2018-03-06

## 2018-08-14 PROBLEM — I10 ESSENTIAL (PRIMARY) HYPERTENSION: Chronic | Status: ACTIVE | Noted: 2018-03-06

## 2018-08-14 PROBLEM — E78.5 HYPERLIPIDEMIA, UNSPECIFIED: Chronic | Status: ACTIVE | Noted: 2018-03-06

## 2018-08-14 PROBLEM — K76.0 FATTY (CHANGE OF) LIVER, NOT ELSEWHERE CLASSIFIED: Chronic | Status: ACTIVE | Noted: 2018-02-13

## 2018-08-14 PROBLEM — R03.0 ELEVATED BLOOD-PRESSURE READING, WITHOUT DIAGNOSIS OF HYPERTENSION: Chronic | Status: ACTIVE | Noted: 2018-02-13

## 2018-08-14 PROBLEM — E55.9 VITAMIN D DEFICIENCY, UNSPECIFIED: Chronic | Status: ACTIVE | Noted: 2018-03-06

## 2018-08-14 PROBLEM — N28.9 DISORDER OF KIDNEY AND URETER, UNSPECIFIED: Chronic | Status: ACTIVE | Noted: 2018-03-06

## 2018-08-14 PROBLEM — E21.3 HYPERPARATHYROIDISM, UNSPECIFIED: Chronic | Status: ACTIVE | Noted: 2018-03-06

## 2018-08-14 PROCEDURE — 99214 OFFICE O/P EST MOD 30 MIN: CPT

## 2018-08-30 ENCOUNTER — RX RENEWAL (OUTPATIENT)
Age: 83
End: 2018-08-30

## 2018-09-06 ENCOUNTER — APPOINTMENT (OUTPATIENT)
Dept: FAMILY MEDICINE | Facility: CLINIC | Age: 83
End: 2018-09-06
Payer: MEDICARE

## 2018-09-06 VITALS
WEIGHT: 143 LBS | SYSTOLIC BLOOD PRESSURE: 142 MMHG | HEART RATE: 59 BPM | DIASTOLIC BLOOD PRESSURE: 64 MMHG | BODY MASS INDEX: 25.34 KG/M2 | OXYGEN SATURATION: 98 % | HEIGHT: 63 IN

## 2018-09-06 DIAGNOSIS — I51.9 HEART DISEASE, UNSPECIFIED: ICD-10-CM

## 2018-09-06 DIAGNOSIS — I10 ESSENTIAL (PRIMARY) HYPERTENSION: ICD-10-CM

## 2018-09-06 DIAGNOSIS — I07.1 RHEUMATIC TRICUSPID INSUFFICIENCY: ICD-10-CM

## 2018-09-06 DIAGNOSIS — D69.6 THROMBOCYTOPENIA, UNSPECIFIED: ICD-10-CM

## 2018-09-06 DIAGNOSIS — R23.8 OTHER SKIN CHANGES: ICD-10-CM

## 2018-09-06 DIAGNOSIS — I37.1 NONRHEUMATIC PULMONARY VALVE INSUFFICIENCY: ICD-10-CM

## 2018-09-06 DIAGNOSIS — R03.0 ELEVATED BLOOD-PRESSURE READING, W/OUT DIAGNOSIS OF HYPERTENSION: ICD-10-CM

## 2018-09-06 DIAGNOSIS — Z95.5 PRESENCE OF CORONARY ANGIOPLASTY IMPLANT AND GRAFT: ICD-10-CM

## 2018-09-06 DIAGNOSIS — I34.0 NONRHEUMATIC MITRAL (VALVE) INSUFFICIENCY: ICD-10-CM

## 2018-09-06 PROCEDURE — 99214 OFFICE O/P EST MOD 30 MIN: CPT

## 2018-09-06 RX ORDER — ASPIRIN 81 MG/1
81 TABLET ORAL DAILY
Qty: 90 | Refills: 0 | Status: DISCONTINUED | COMMUNITY
Start: 2017-11-27 | End: 2018-09-06

## 2018-09-06 RX ORDER — LOSARTAN POTASSIUM 25 MG/1
25 TABLET, FILM COATED ORAL
Refills: 0 | Status: DISCONTINUED | COMMUNITY
End: 2018-09-06

## 2018-09-06 RX ORDER — CARVEDILOL 3.12 MG/1
3.12 TABLET, FILM COATED ORAL TWICE DAILY
Refills: 0 | Status: DISCONTINUED | COMMUNITY
End: 2018-09-06

## 2018-09-07 PROBLEM — Z95.5 PRESENCE OF DRUG-ELUTING STENT IN RIGHT CORONARY ARTERY: Status: ACTIVE | Noted: 2018-09-06

## 2018-09-07 PROBLEM — R23.8 EASY BRUISABILITY: Status: ACTIVE | Noted: 2018-09-07

## 2018-09-07 PROBLEM — I10 BENIGN ESSENTIAL HYPERTENSION: Status: ACTIVE | Noted: 2017-11-27

## 2018-09-07 NOTE — HISTORY OF PRESENT ILLNESS
[FreeTextEntry1] : Patient here states she wants to get a script to get her spleen checked. She states she saw her cardiologist  about 2 months ago and he suggested she get her spleen checked due to bruises she has on both her arms.   [de-identified] : Pt c/o easy bruisability since starting on Plavix. She had been on ASA but she was advised by another treating physician to discontinue it. Pt is s/p percutaneous angioplasty with unsuccessful attempt at stent placement to LAD on 3/13/18 and subsequent perforation. She had a repeat angioplasty on 4/3/18 with placement of a LORELEI to the prox RCA and mid LAD.  Pt also with cirrhosis which has been stable. She has CMP labs pending, ordered by her GI. She denies cp, sob or palpitations. She denies any epistaxis, bleeding gums, blood in the urine or stool.

## 2018-09-07 NOTE — REVIEW OF SYSTEMS
[Easy Bleeding] : no easy bleeding [Easy Bruising] : easy bruising [Negative] : Gastrointestinal [de-identified] : bruising on her arms b/l

## 2018-09-07 NOTE — PHYSICAL EXAM
[No Acute Distress] : no acute distress [Normal Sclera/Conjunctiva] : normal sclera/conjunctiva [Normal Oropharynx] : the oropharynx was normal [Supple] : supple [No Lymphadenopathy] : no lymphadenopathy [No Respiratory Distress] : no respiratory distress  [Clear to Auscultation] : lungs were clear to auscultation bilaterally [Normal Rate] : normal [Rhythm Regular] : regular [Normal S1] : normal S1 [Normal S2] : normal S2 [II] : a grade 2 [No Edema] : there was no peripheral edema [Soft] : abdomen soft [Non Tender] : non-tender [Non-distended] : non-distended [No Masses] : no abdominal mass palpated [Normal Bowel Sounds] : normal bowel sounds [Normal Anterior Cervical Nodes] : no anterior cervical lymphadenopathy [Normal Affect] : the affect was normal [Normal Mood] : the mood was normal [de-identified] : Ecchymotic patches noted on b/l forearms.

## 2018-09-07 NOTE — ASSESSMENT
[FreeTextEntry1] : Avoid NSAIDS. Agree with holding ASA.\par Repeat platelet count, check coags.\par F/U 1 month.\par Flu shot at f/u.

## 2018-09-09 ENCOUNTER — FORM ENCOUNTER (OUTPATIENT)
Age: 83
End: 2018-09-09

## 2018-09-10 ENCOUNTER — OUTPATIENT (OUTPATIENT)
Dept: OUTPATIENT SERVICES | Facility: HOSPITAL | Age: 83
LOS: 1 days | End: 2018-09-10

## 2018-09-10 ENCOUNTER — LABORATORY RESULT (OUTPATIENT)
Age: 83
End: 2018-09-10

## 2018-09-10 ENCOUNTER — APPOINTMENT (OUTPATIENT)
Dept: ULTRASOUND IMAGING | Facility: CLINIC | Age: 83
End: 2018-09-10
Payer: MEDICARE

## 2018-09-10 DIAGNOSIS — Z90.710 ACQUIRED ABSENCE OF BOTH CERVIX AND UTERUS: Chronic | ICD-10-CM

## 2018-09-10 DIAGNOSIS — Z98.890 OTHER SPECIFIED POSTPROCEDURAL STATES: Chronic | ICD-10-CM

## 2018-09-10 DIAGNOSIS — K74.60 UNSPECIFIED CIRRHOSIS OF LIVER: ICD-10-CM

## 2018-09-10 DIAGNOSIS — Z98.49 CATARACT EXTRACTION STATUS, UNSPECIFIED EYE: Chronic | ICD-10-CM

## 2018-09-10 DIAGNOSIS — Z90.49 ACQUIRED ABSENCE OF OTHER SPECIFIED PARTS OF DIGESTIVE TRACT: Chronic | ICD-10-CM

## 2018-09-10 PROCEDURE — 76700 US EXAM ABDOM COMPLETE: CPT | Mod: 26

## 2018-09-18 DIAGNOSIS — K83.8 OTHER SPECIFIED DISEASES OF BILIARY TRACT: ICD-10-CM

## 2018-09-18 LAB
APTT BLD: 33.5 SEC
BASOPHILS # BLD AUTO: 0.02 K/UL
BASOPHILS NFR BLD AUTO: 0.5 %
EOSINOPHIL # BLD AUTO: 0.14 K/UL
EOSINOPHIL NFR BLD AUTO: 3.8 %
HCT VFR BLD CALC: 40.3 %
HGB BLD-MCNC: 13.2 G/DL
IMM GRANULOCYTES NFR BLD AUTO: 0.3 %
INR PPP: 1.12 RATIO
LYMPHOCYTES # BLD AUTO: 0.46 K/UL
LYMPHOCYTES NFR BLD AUTO: 12.4 %
MAN DIFF?: NORMAL
MCHC RBC-ENTMCNC: 31.4 PG
MCHC RBC-ENTMCNC: 32.8 GM/DL
MCV RBC AUTO: 96 FL
MONOCYTES # BLD AUTO: 0.39 K/UL
MONOCYTES NFR BLD AUTO: 10.5 %
NEUTROPHILS # BLD AUTO: 2.69 K/UL
NEUTROPHILS NFR BLD AUTO: 72.5 %
PLATELET # BLD AUTO: 104 K/UL
PT BLD: 12.7 SEC
RBC # BLD: 4.2 M/UL
RBC # FLD: 15.7 %
WBC # FLD AUTO: 3.71 K/UL

## 2018-09-20 ENCOUNTER — RESULT REVIEW (OUTPATIENT)
Age: 83
End: 2018-09-20

## 2018-09-20 LAB
ALBUMIN SERPL ELPH-MCNC: 3.6 G/DL
ALP BLD-CCNC: 119 U/L
ALT SERPL-CCNC: 21 U/L
ANION GAP SERPL CALC-SCNC: 14 MMOL/L
AST SERPL-CCNC: 40 U/L
BILIRUB SERPL-MCNC: 1.6 MG/DL
BUN SERPL-MCNC: 26 MG/DL
CALCIUM SERPL-MCNC: 9.3 MG/DL
CHLORIDE SERPL-SCNC: 104 MMOL/L
CO2 SERPL-SCNC: 25 MMOL/L
CREAT SERPL-MCNC: 1.35 MG/DL
GLUCOSE SERPL-MCNC: 103 MG/DL
POTASSIUM SERPL-SCNC: 4.9 MMOL/L
PROT SERPL-MCNC: 5.8 G/DL
SODIUM SERPL-SCNC: 143 MMOL/L

## 2018-09-24 ENCOUNTER — MEDICATION RENEWAL (OUTPATIENT)
Age: 83
End: 2018-09-24

## 2018-10-19 ENCOUNTER — APPOINTMENT (OUTPATIENT)
Dept: FAMILY MEDICINE | Facility: CLINIC | Age: 83
End: 2018-10-19

## 2018-10-23 ENCOUNTER — APPOINTMENT (OUTPATIENT)
Dept: GASTROENTEROLOGY | Facility: CLINIC | Age: 83
End: 2018-10-23
Payer: MEDICARE

## 2018-10-23 VITALS
WEIGHT: 148 LBS | DIASTOLIC BLOOD PRESSURE: 62 MMHG | SYSTOLIC BLOOD PRESSURE: 135 MMHG | OXYGEN SATURATION: 97 % | BODY MASS INDEX: 26.22 KG/M2 | RESPIRATION RATE: 15 BRPM | HEART RATE: 63 BPM | HEIGHT: 63 IN

## 2018-10-23 PROCEDURE — 99214 OFFICE O/P EST MOD 30 MIN: CPT

## 2018-11-12 LAB
ALBUMIN SERPL ELPH-MCNC: 3.2 G/DL
ALP BLD-CCNC: 196 U/L
ALT SERPL-CCNC: 38 U/L
ANION GAP SERPL CALC-SCNC: 10 MMOL/L
AST SERPL-CCNC: 55 U/L
BILIRUB SERPL-MCNC: 1.2 MG/DL
BUN SERPL-MCNC: 25 MG/DL
CALCIUM SERPL-MCNC: 9.1 MG/DL
CHLORIDE SERPL-SCNC: 111 MMOL/L
CO2 SERPL-SCNC: 22 MMOL/L
CREAT SERPL-MCNC: 1.23 MG/DL
GLUCOSE SERPL-MCNC: 103 MG/DL
POTASSIUM SERPL-SCNC: 5 MMOL/L
PROT SERPL-MCNC: 5.7 G/DL
SODIUM SERPL-SCNC: 143 MMOL/L

## 2018-11-15 ENCOUNTER — OTHER (OUTPATIENT)
Age: 83
End: 2018-11-15

## 2018-11-18 ENCOUNTER — FORM ENCOUNTER (OUTPATIENT)
Age: 83
End: 2018-11-18

## 2018-11-19 ENCOUNTER — MED ADMIN CHARGE (OUTPATIENT)
Age: 83
End: 2018-11-19

## 2018-11-19 ENCOUNTER — APPOINTMENT (OUTPATIENT)
Dept: FAMILY MEDICINE | Facility: CLINIC | Age: 83
End: 2018-11-19
Payer: MEDICARE

## 2018-11-19 ENCOUNTER — APPOINTMENT (OUTPATIENT)
Dept: RADIOLOGY | Facility: CLINIC | Age: 83
End: 2018-11-19

## 2018-11-19 ENCOUNTER — OUTPATIENT (OUTPATIENT)
Dept: OUTPATIENT SERVICES | Facility: HOSPITAL | Age: 83
LOS: 1 days | End: 2018-11-19
Payer: MEDICARE

## 2018-11-19 VITALS
HEART RATE: 64 BPM | HEIGHT: 63 IN | RESPIRATION RATE: 16 BRPM | SYSTOLIC BLOOD PRESSURE: 130 MMHG | OXYGEN SATURATION: 99 % | DIASTOLIC BLOOD PRESSURE: 60 MMHG | WEIGHT: 148 LBS | BODY MASS INDEX: 26.22 KG/M2

## 2018-11-19 DIAGNOSIS — M89.8X5 OTHER SPECIFIED DISORDERS OF BONE, THIGH: ICD-10-CM

## 2018-11-19 DIAGNOSIS — S80.00XA CONTUSION OF UNSPECIFIED KNEE, INITIAL ENCOUNTER: ICD-10-CM

## 2018-11-19 DIAGNOSIS — Z90.710 ACQUIRED ABSENCE OF BOTH CERVIX AND UTERUS: Chronic | ICD-10-CM

## 2018-11-19 DIAGNOSIS — K76.6 PORTAL HYPERTENSION: ICD-10-CM

## 2018-11-19 DIAGNOSIS — Z98.890 OTHER SPECIFIED POSTPROCEDURAL STATES: Chronic | ICD-10-CM

## 2018-11-19 DIAGNOSIS — Z98.49 CATARACT EXTRACTION STATUS, UNSPECIFIED EYE: Chronic | ICD-10-CM

## 2018-11-19 DIAGNOSIS — M54.5 LOW BACK PAIN: ICD-10-CM

## 2018-11-19 DIAGNOSIS — Z92.29 PERSONAL HISTORY OF OTHER DRUG THERAPY: ICD-10-CM

## 2018-11-19 DIAGNOSIS — R60.0 LOCALIZED EDEMA: ICD-10-CM

## 2018-11-19 DIAGNOSIS — I25.10 ATHEROSCLEROTIC HEART DISEASE OF NATIVE CORONARY ARTERY W/OUT ANGINA PECTORIS: ICD-10-CM

## 2018-11-19 DIAGNOSIS — Z90.49 ACQUIRED ABSENCE OF OTHER SPECIFIED PARTS OF DIGESTIVE TRACT: Chronic | ICD-10-CM

## 2018-11-19 PROCEDURE — 73521 X-RAY EXAM HIPS BI 2 VIEWS: CPT

## 2018-11-19 PROCEDURE — 99214 OFFICE O/P EST MOD 30 MIN: CPT

## 2018-11-19 PROCEDURE — 73521 X-RAY EXAM HIPS BI 2 VIEWS: CPT | Mod: 26

## 2018-11-19 NOTE — REVIEW OF SYSTEMS
[Fever] : no fever [Earache] : no earache [Chest Pain] : no chest pain [Shortness Of Breath] : no shortness of breath [Abdominal Pain] : no abdominal pain [Dysuria] : no dysuria [Headache] : no headache [FreeTextEntry9] : aches in bl hip and legs

## 2018-11-19 NOTE — DATA REVIEWED
[FreeTextEntry1] : reviewed labs recent and increased lft\par reviewed gi consult \par ordered and reviewed bl xray of hips

## 2018-11-19 NOTE — HISTORY OF PRESENT ILLNESS
[FreeTextEntry8] : Pt c/o pain to bi-lat LE s/p fall approx 1 month ago.  Pt states she tripped over a throw rug , landed on her knees.  Pt states pain started a couple of days later, to upper thigh area , radiating around to outer thighs. Pt states pain is worse when walking . Pt also throughout the day with weight bearing.  Pt denies any other pain or injury. pt notes pain in l llateral hip when get up from seated .pt daughter notes mom is being followed w dr verdin for non alcoholic cirrhosis, pt notes l leg most blothersome getting up from seated and walking w pain in l lateral hip, pt reports w recent fall no head trauma, pt notes she did not faint ,

## 2018-11-19 NOTE — HEALTH RISK ASSESSMENT
[0] : 2) Feeling down, depressed, or hopeless: Not at all (0) [] : No [de-identified] : GI [[id number]] : 0

## 2018-11-19 NOTE — PHYSICAL EXAM
[PERRL] : pupils equal round and reactive to light [Normal Oropharynx] : the oropharynx was normal [Soft] : abdomen soft [Non Tender] : non-tender [Normal Anterior Cervical Nodes] : no anterior cervical lymphadenopathy [No CVA Tenderness] : no CVA  tenderness [de-identified] : decent rom hips bl w abd bl, able to flex both hips bl, neg slr bl,

## 2018-11-27 ENCOUNTER — APPOINTMENT (OUTPATIENT)
Dept: GASTROENTEROLOGY | Facility: CLINIC | Age: 83
End: 2018-11-27
Payer: MEDICARE

## 2018-11-27 VITALS — BODY MASS INDEX: 25.87 KG/M2 | OXYGEN SATURATION: 98 % | RESPIRATION RATE: 16 BRPM | HEIGHT: 63 IN | WEIGHT: 146 LBS

## 2018-11-27 VITALS — HEART RATE: 77 BPM | DIASTOLIC BLOOD PRESSURE: 80 MMHG | SYSTOLIC BLOOD PRESSURE: 149 MMHG

## 2018-11-27 DIAGNOSIS — K74.60 UNSPECIFIED CIRRHOSIS OF LIVER: ICD-10-CM

## 2018-11-27 PROCEDURE — 99214 OFFICE O/P EST MOD 30 MIN: CPT

## 2018-12-20 ENCOUNTER — RX RENEWAL (OUTPATIENT)
Age: 83
End: 2018-12-20

## 2018-12-20 RX ORDER — SPIRONOLACTONE 100 MG/1
100 TABLET ORAL DAILY
Qty: 30 | Refills: 1 | Status: ACTIVE | COMMUNITY
Start: 2018-06-14 | End: 1900-01-01

## 2019-01-03 ENCOUNTER — APPOINTMENT (OUTPATIENT)
Dept: FAMILY MEDICINE | Facility: CLINIC | Age: 84
End: 2019-01-03
Payer: MEDICARE

## 2019-01-03 DIAGNOSIS — M54.6 PAIN IN THORACIC SPINE: ICD-10-CM

## 2019-01-03 PROCEDURE — 36415 COLL VENOUS BLD VENIPUNCTURE: CPT

## 2019-01-03 PROCEDURE — 99214 OFFICE O/P EST MOD 30 MIN: CPT | Mod: 25

## 2019-01-04 ENCOUNTER — EMERGENCY (EMERGENCY)
Facility: HOSPITAL | Age: 84
LOS: 1 days | Discharge: HOME | End: 2019-01-04
Attending: EMERGENCY MEDICINE
Payer: MEDICARE

## 2019-01-04 VITALS
TEMPERATURE: 98 F | DIASTOLIC BLOOD PRESSURE: 56 MMHG | SYSTOLIC BLOOD PRESSURE: 122 MMHG | RESPIRATION RATE: 18 BRPM | HEART RATE: 97 BPM | OXYGEN SATURATION: 99 %

## 2019-01-04 VITALS
SYSTOLIC BLOOD PRESSURE: 120 MMHG | RESPIRATION RATE: 26 BRPM | HEIGHT: 63 IN | TEMPERATURE: 98 F | DIASTOLIC BLOOD PRESSURE: 70 MMHG | HEART RATE: 97 BPM | OXYGEN SATURATION: 99 % | WEIGHT: 139.99 LBS

## 2019-01-04 DIAGNOSIS — Z98.49 CATARACT EXTRACTION STATUS, UNSPECIFIED EYE: Chronic | ICD-10-CM

## 2019-01-04 DIAGNOSIS — Z98.890 OTHER SPECIFIED POSTPROCEDURAL STATES: Chronic | ICD-10-CM

## 2019-01-04 DIAGNOSIS — Z90.49 ACQUIRED ABSENCE OF OTHER SPECIFIED PARTS OF DIGESTIVE TRACT: Chronic | ICD-10-CM

## 2019-01-04 DIAGNOSIS — Z90.710 ACQUIRED ABSENCE OF BOTH CERVIX AND UTERUS: Chronic | ICD-10-CM

## 2019-01-04 LAB
ALBUMIN SERPL ELPH-MCNC: 3.2 G/DL — LOW (ref 3.3–5.2)
ALP SERPL-CCNC: 181 U/L — HIGH (ref 40–120)
ALT FLD-CCNC: 28 U/L — SIGNIFICANT CHANGE UP
ANION GAP SERPL CALC-SCNC: 13 MMOL/L — SIGNIFICANT CHANGE UP (ref 5–17)
APTT BLD: 30.8 SEC — SIGNIFICANT CHANGE UP (ref 27.5–36.3)
AST SERPL-CCNC: 47 U/L — HIGH
BASOPHILS # BLD AUTO: 0 K/UL — SIGNIFICANT CHANGE UP (ref 0–0.2)
BILIRUB SERPL-MCNC: 1.3 MG/DL — SIGNIFICANT CHANGE UP (ref 0.4–2)
BUN SERPL-MCNC: 32 MG/DL — HIGH (ref 8–20)
CALCIUM SERPL-MCNC: 9.2 MG/DL — SIGNIFICANT CHANGE UP (ref 8.6–10.2)
CHLORIDE SERPL-SCNC: 108 MMOL/L — HIGH (ref 98–107)
CO2 SERPL-SCNC: 19 MMOL/L — LOW (ref 22–29)
CREAT SERPL-MCNC: 1.42 MG/DL — HIGH (ref 0.5–1.3)
D DIMER BLD IA.RAPID-MCNC: 536 NG/ML DDU — HIGH
EOSINOPHIL # BLD AUTO: 0.1 K/UL — SIGNIFICANT CHANGE UP (ref 0–0.5)
EOSINOPHIL NFR BLD AUTO: 2 % — SIGNIFICANT CHANGE UP (ref 0–5)
GLUCOSE SERPL-MCNC: 156 MG/DL — HIGH (ref 70–115)
HCT VFR BLD CALC: 39.5 % — SIGNIFICANT CHANGE UP (ref 37–47)
HGB BLD-MCNC: 13.4 G/DL — SIGNIFICANT CHANGE UP (ref 12–16)
INR BLD: 1.14 RATIO — SIGNIFICANT CHANGE UP (ref 0.88–1.16)
LYMPHOCYTES # BLD AUTO: 0.4 K/UL — LOW (ref 1–4.8)
LYMPHOCYTES # BLD AUTO: 7 % — LOW (ref 20–55)
MCHC RBC-ENTMCNC: 32.6 PG — HIGH (ref 27–31)
MCHC RBC-ENTMCNC: 33.9 G/DL — SIGNIFICANT CHANGE UP (ref 32–36)
MCV RBC AUTO: 96.1 FL — SIGNIFICANT CHANGE UP (ref 81–99)
MONOCYTES # BLD AUTO: 0.6 K/UL — SIGNIFICANT CHANGE UP (ref 0–0.8)
MONOCYTES NFR BLD AUTO: 13 % — HIGH (ref 3–10)
NEUTROPHILS # BLD AUTO: 3.4 K/UL — SIGNIFICANT CHANGE UP (ref 1.8–8)
NEUTROPHILS NFR BLD AUTO: 76 % — HIGH (ref 37–73)
NEUTS BAND # BLD: 2 % — SIGNIFICANT CHANGE UP (ref 0–8)
PLAT MORPH BLD: NORMAL — SIGNIFICANT CHANGE UP
PLATELET # BLD AUTO: 97 K/UL — LOW (ref 150–400)
POTASSIUM SERPL-MCNC: 5 MMOL/L — SIGNIFICANT CHANGE UP (ref 3.5–5.3)
POTASSIUM SERPL-SCNC: 5 MMOL/L — SIGNIFICANT CHANGE UP (ref 3.5–5.3)
PROT SERPL-MCNC: 6 G/DL — LOW (ref 6.6–8.7)
PROTHROM AB SERPL-ACNC: 13.2 SEC — HIGH (ref 10–12.9)
RBC # BLD: 4.11 M/UL — LOW (ref 4.4–5.2)
RBC # FLD: 14.3 % — SIGNIFICANT CHANGE UP (ref 11–15.6)
RBC BLD AUTO: NORMAL — SIGNIFICANT CHANGE UP
SODIUM SERPL-SCNC: 140 MMOL/L — SIGNIFICANT CHANGE UP (ref 135–145)
TROPONIN T SERPL-MCNC: <0.01 NG/ML — SIGNIFICANT CHANGE UP (ref 0–0.06)
WBC # BLD: 4.5 K/UL — LOW (ref 4.8–10.8)
WBC # FLD AUTO: 4.5 K/UL — LOW (ref 4.8–10.8)

## 2019-01-04 PROCEDURE — 99284 EMERGENCY DEPT VISIT MOD MDM: CPT

## 2019-01-04 PROCEDURE — 85379 FIBRIN DEGRADATION QUANT: CPT

## 2019-01-04 PROCEDURE — 99284 EMERGENCY DEPT VISIT MOD MDM: CPT | Mod: 25

## 2019-01-04 PROCEDURE — 93010 ELECTROCARDIOGRAM REPORT: CPT

## 2019-01-04 PROCEDURE — 85027 COMPLETE CBC AUTOMATED: CPT

## 2019-01-04 PROCEDURE — 80053 COMPREHEN METABOLIC PANEL: CPT

## 2019-01-04 PROCEDURE — 36415 COLL VENOUS BLD VENIPUNCTURE: CPT

## 2019-01-04 PROCEDURE — 71045 X-RAY EXAM CHEST 1 VIEW: CPT

## 2019-01-04 PROCEDURE — 85730 THROMBOPLASTIN TIME PARTIAL: CPT

## 2019-01-04 PROCEDURE — 96374 THER/PROPH/DIAG INJ IV PUSH: CPT

## 2019-01-04 PROCEDURE — 71250 CT THORAX DX C-: CPT | Mod: 26

## 2019-01-04 PROCEDURE — 71250 CT THORAX DX C-: CPT

## 2019-01-04 PROCEDURE — 84484 ASSAY OF TROPONIN QUANT: CPT

## 2019-01-04 PROCEDURE — 93005 ELECTROCARDIOGRAM TRACING: CPT

## 2019-01-04 PROCEDURE — 85610 PROTHROMBIN TIME: CPT

## 2019-01-04 PROCEDURE — 71045 X-RAY EXAM CHEST 1 VIEW: CPT | Mod: 26

## 2019-01-04 RX ORDER — SODIUM CHLORIDE 9 MG/ML
2000 INJECTION INTRAMUSCULAR; INTRAVENOUS; SUBCUTANEOUS ONCE
Qty: 0 | Refills: 0 | Status: COMPLETED | OUTPATIENT
Start: 2019-01-04 | End: 2019-01-04

## 2019-01-04 RX ORDER — OXYCODONE HYDROCHLORIDE 5 MG/1
1 TABLET ORAL
Qty: 28 | Refills: 0 | OUTPATIENT
Start: 2019-01-04 | End: 2019-01-10

## 2019-01-04 RX ORDER — MORPHINE SULFATE 50 MG/1
2 CAPSULE, EXTENDED RELEASE ORAL ONCE
Qty: 0 | Refills: 0 | Status: COMPLETED | OUTPATIENT
Start: 2019-01-04 | End: 2019-01-04

## 2019-01-04 RX ORDER — KETOROLAC TROMETHAMINE 30 MG/ML
15 SYRINGE (ML) INJECTION ONCE
Qty: 0 | Refills: 0 | Status: DISCONTINUED | OUTPATIENT
Start: 2019-01-04 | End: 2019-01-04

## 2019-01-04 RX ADMIN — SODIUM CHLORIDE 2000 MILLILITER(S): 9 INJECTION INTRAMUSCULAR; INTRAVENOUS; SUBCUTANEOUS at 13:07

## 2019-01-04 RX ADMIN — Medication 15 MILLIGRAM(S): at 10:23

## 2019-01-04 RX ADMIN — Medication 15 MILLIGRAM(S): at 10:55

## 2019-01-04 NOTE — ED PROVIDER NOTE - OBJECTIVE STATEMENT
pt comes in for c/o midscapular pain x two month getting worse    pain worse with deep breaths and cough   midline tenderness   no SOB , no CP

## 2019-01-04 NOTE — ED ADULT TRIAGE NOTE - CHIEF COMPLAINT QUOTE
Upper back pain for past 2 days, worsening, states today woke up today and was having difficulty breathing

## 2019-01-04 NOTE — ED ADULT NURSE REASSESSMENT NOTE - NS ED NURSE REASSESS COMMENT FT1
Pt is resting in bed comfortably at this time, no apparent distress noted at this time. pt safety maintained. Pt denies any complaints at this time, when she moves she still feels the pain, but It is diminished after the pain medication. Plan of care explained, pt states understanding and was able to successfully teach back to show proficiency.

## 2019-01-04 NOTE — ED ADULT NURSE NOTE - OBJECTIVE STATEMENT
Pt is an 83YOF who is here for difficulty breathing. Pt states the pain in her back has caused her to have difficulty breathing/shortness of breath. Pt is not in any distress or discomfort at this time.

## 2019-01-04 NOTE — ED PROVIDER NOTE - MEDICAL DECISION MAKING DETAILS
pt with braest mass and likely mets to bone and lung pt with breast mass and likely mets to bone and lung

## 2019-01-04 NOTE — ED ADULT NURSE NOTE - NSIMPLEMENTINTERV_GEN_ALL_ED
Implemented All Universal Safety Interventions:  [place] to call system. Call bell, personal items and telephone within reach. Instruct patient to call for assistance. Room bathroom lighting operational. Non-slip footwear when patient is off stretcher. Physically safe environment: no spills, clutter or unnecessary equipment. Stretcher in lowest position, wheels locked, appropriate side rails in place.

## 2019-01-04 NOTE — ED STATDOCS - PROGRESS NOTE DETAILS
83 y.o F PMHx CAD, Liver Disease and PSHx Stents presents to ED c/o back pain worse with movement x1 week and dyspnea since last night, worse this morning while trying to get out of bed. Pt describes that she "couldn't get any :. Seen by PA yesterday for back pain yesterday, dx possible musculoskeletal issues, lungs were clear at that time as per pt's daughter. Denies chest pain, peripheral edema, calf pain,  Cardiologist: Dr. Zaman  PMD: Dr. Evangelista  Will refer to Main ED for further evaluation.

## 2019-01-07 ENCOUNTER — APPOINTMENT (OUTPATIENT)
Dept: ORTHOPEDIC SURGERY | Facility: CLINIC | Age: 84
End: 2019-01-07

## 2019-01-09 NOTE — ASU PATIENT PROFILE, ADULT - PRO TOBACCO TYPE
cigarettes return to ED if symptoms worsen, persist or questions arise/need for outpatient follow-up

## 2019-01-14 ENCOUNTER — INPATIENT (INPATIENT)
Facility: HOSPITAL | Age: 84
LOS: 13 days | Discharge: HOSPICE | DRG: 477 | End: 2019-01-28
Attending: HOSPITALIST | Admitting: HOSPITALIST
Payer: MEDICARE

## 2019-01-14 VITALS
DIASTOLIC BLOOD PRESSURE: 67 MMHG | OXYGEN SATURATION: 96 % | WEIGHT: 138.89 LBS | HEIGHT: 63 IN | HEART RATE: 77 BPM | SYSTOLIC BLOOD PRESSURE: 122 MMHG | TEMPERATURE: 98 F | RESPIRATION RATE: 18 BRPM

## 2019-01-14 DIAGNOSIS — Z98.49 CATARACT EXTRACTION STATUS, UNSPECIFIED EYE: Chronic | ICD-10-CM

## 2019-01-14 DIAGNOSIS — Z90.710 ACQUIRED ABSENCE OF BOTH CERVIX AND UTERUS: Chronic | ICD-10-CM

## 2019-01-14 DIAGNOSIS — Z98.890 OTHER SPECIFIED POSTPROCEDURAL STATES: Chronic | ICD-10-CM

## 2019-01-14 DIAGNOSIS — Z90.49 ACQUIRED ABSENCE OF OTHER SPECIFIED PARTS OF DIGESTIVE TRACT: Chronic | ICD-10-CM

## 2019-01-14 LAB
ALBUMIN SERPL ELPH-MCNC: 2.6 G/DL — LOW (ref 3.3–5.2)
ALP SERPL-CCNC: 324 U/L — HIGH (ref 40–120)
ALT FLD-CCNC: 57 U/L — HIGH
ANION GAP SERPL CALC-SCNC: 11 MMOL/L — SIGNIFICANT CHANGE UP (ref 5–17)
AST SERPL-CCNC: 139 U/L — HIGH
BILIRUB SERPL-MCNC: 4.1 MG/DL — HIGH (ref 0.4–2)
BUN SERPL-MCNC: 35 MG/DL — HIGH (ref 8–20)
CALCIUM SERPL-MCNC: 10.1 MG/DL — SIGNIFICANT CHANGE UP (ref 8.6–10.2)
CHLORIDE SERPL-SCNC: 103 MMOL/L — SIGNIFICANT CHANGE UP (ref 98–107)
CO2 SERPL-SCNC: 17 MMOL/L — LOW (ref 22–29)
CREAT SERPL-MCNC: 1.4 MG/DL — HIGH (ref 0.5–1.3)
GLUCOSE SERPL-MCNC: 155 MG/DL — HIGH (ref 70–115)
HCT VFR BLD CALC: 36.2 % — LOW (ref 37–47)
HGB BLD-MCNC: 12.4 G/DL — SIGNIFICANT CHANGE UP (ref 12–16)
INR BLD: 1.27 RATIO — HIGH (ref 0.88–1.16)
MAGNESIUM SERPL-MCNC: 2.2 MG/DL — SIGNIFICANT CHANGE UP (ref 1.6–2.6)
MCHC RBC-ENTMCNC: 32.1 PG — HIGH (ref 27–31)
MCHC RBC-ENTMCNC: 34.3 G/DL — SIGNIFICANT CHANGE UP (ref 32–36)
MCV RBC AUTO: 93.8 FL — SIGNIFICANT CHANGE UP (ref 81–99)
NT-PROBNP SERPL-SCNC: 4768 PG/ML — HIGH (ref 0–300)
PLATELET # BLD AUTO: 127 K/UL — LOW (ref 150–400)
POTASSIUM SERPL-MCNC: 5.2 MMOL/L — SIGNIFICANT CHANGE UP (ref 3.5–5.3)
POTASSIUM SERPL-SCNC: 5.2 MMOL/L — SIGNIFICANT CHANGE UP (ref 3.5–5.3)
PROT SERPL-MCNC: 5.7 G/DL — LOW (ref 6.6–8.7)
PROTHROM AB SERPL-ACNC: 14.7 SEC — HIGH (ref 10–12.9)
RBC # BLD: 3.86 M/UL — LOW (ref 4.4–5.2)
RBC # FLD: 14 % — SIGNIFICANT CHANGE UP (ref 11–15.6)
SODIUM SERPL-SCNC: 131 MMOL/L — LOW (ref 135–145)
TROPONIN T SERPL-MCNC: 0.09 NG/ML — HIGH (ref 0–0.06)
WBC # BLD: 9.6 K/UL — SIGNIFICANT CHANGE UP (ref 4.8–10.8)
WBC # FLD AUTO: 9.6 K/UL — SIGNIFICANT CHANGE UP (ref 4.8–10.8)

## 2019-01-14 PROCEDURE — 99285 EMERGENCY DEPT VISIT HI MDM: CPT

## 2019-01-14 PROCEDURE — 93010 ELECTROCARDIOGRAM REPORT: CPT

## 2019-01-14 PROCEDURE — 71045 X-RAY EXAM CHEST 1 VIEW: CPT | Mod: 26

## 2019-01-14 PROCEDURE — 73060 X-RAY EXAM OF HUMERUS: CPT | Mod: 26,LT

## 2019-01-14 RX ORDER — ASPIRIN/CALCIUM CARB/MAGNESIUM 324 MG
325 TABLET ORAL ONCE
Qty: 0 | Refills: 0 | Status: COMPLETED | OUTPATIENT
Start: 2019-01-14 | End: 2019-01-14

## 2019-01-14 RX ORDER — CLOPIDOGREL BISULFATE 75 MG/1
75 TABLET, FILM COATED ORAL DAILY
Qty: 0 | Refills: 0 | Status: DISCONTINUED | OUTPATIENT
Start: 2019-01-14 | End: 2019-01-15

## 2019-01-14 RX ORDER — SODIUM CHLORIDE 9 MG/ML
3 INJECTION INTRAMUSCULAR; INTRAVENOUS; SUBCUTANEOUS ONCE
Qty: 0 | Refills: 0 | Status: COMPLETED | OUTPATIENT
Start: 2019-01-14 | End: 2019-01-14

## 2019-01-14 RX ORDER — MORPHINE SULFATE 50 MG/1
2 CAPSULE, EXTENDED RELEASE ORAL ONCE
Qty: 0 | Refills: 0 | Status: DISCONTINUED | OUTPATIENT
Start: 2019-01-14 | End: 2019-01-14

## 2019-01-14 RX ADMIN — SODIUM CHLORIDE 3 MILLILITER(S): 9 INJECTION INTRAMUSCULAR; INTRAVENOUS; SUBCUTANEOUS at 20:22

## 2019-01-14 RX ADMIN — Medication 325 MILLIGRAM(S): at 21:55

## 2019-01-14 RX ADMIN — CLOPIDOGREL BISULFATE 75 MILLIGRAM(S): 75 TABLET, FILM COATED ORAL at 21:55

## 2019-01-14 RX ADMIN — MORPHINE SULFATE 2 MILLIGRAM(S): 50 CAPSULE, EXTENDED RELEASE ORAL at 22:31

## 2019-01-14 RX ADMIN — MORPHINE SULFATE 2 MILLIGRAM(S): 50 CAPSULE, EXTENDED RELEASE ORAL at 22:30

## 2019-01-14 NOTE — ED PROVIDER NOTE - PROGRESS NOTE DETAILS
Spoke with [facility] ortho regarding patient's condition. Patient's cardiologist and PCP were contacted, still awaiting call back. Spoke with Dr. Evangelista, who will speak with the family directly. Also spoke with Dr. Robles (on call cardiologist for Dr. Zaman) who recommends if it does not seem the patient is going to have the biopsy performed tomorrow to re-initiate the patient's Plavix. If she is admitted, then cardiology will see the patient in the morning. Patient re-evaluated, resting generally comfortably. However, labs are as noted. Given elevated troponin and BNP in the setting of cancer, will assess for acute PE given the patient has no other symptoms. Hospitalist requesting ct head to eval for mets. CT is a noted. Patient admitted. Patient continue to complain of urine urgency without out-put. Bladder scan requested and significant retention (> 700cc's)  noted. Abbasi order placec

## 2019-01-14 NOTE — ED PROVIDER NOTE - OBJECTIVE STATEMENT
82 y/o F with PMHx of aortic stenosis, thrombocytopenia, HLD, HTN and renal insufficiency and PSHx of cardiac stent x2 presents to ED c/o sudden weakness in the DEVAN LE onset today. Children state that while attempting to help the patient get out of the car, they heard a "crack" in her arm, and the patient stated she was unable to move her legs, so they helped her to the ground and activated EMS. Daughter denies the patient falling or hitting her head, or losing consciousness.     As per daughter, the patient was at [facility] 1 week ago for evaluation of back pain and shortness of breath and when obtaining a CT, found a mass on her breast. Since being discharged from [facility], followed up with oncology and is scheduled to have a biopsy of the breast mass tomorrow. Yesterday, was experiencing worsening back pain, despite taking Oxycodone daily and children state "we could not even touch her anywhere without causing her extreme pain", so they took her to [facility] since that is where her oncologist referred them to. She wound up spending the night at [facility] last night, for pain management and while there was found to have a fracture in her L arm with a lesion, that is going to require surgery. She was discharged from [facility] about 30 minutes prior to this episode of sudden weakness occurring.   The patient's family expresses concern over not having the proper resources to care for her.     Oncology: Dr. Presley ([facility])   Cardiologist: Dr. Zaman (was switched from Plavix to baby aspirin 5 days ago)  PCP: Formerly Dr. Deja Evangelista, first appointment with Dr. Solis this week 84 y/o F with PMHx of aortic stenosis, thrombocytopenia, HLD, HTN and renal insufficiency and PSHx of cardiac stent x2 presents to ED c/o sudden weakness in the DEVAN LE onset today. Children state that while attempting to help the patient get out of the car, they heard a "crack" in her arm, and the patient stated she was unable to move her legs, so they helped her to the ground and activated EMS. Daughter denies the patient falling or hitting her head, or losing consciousness. Currently the patient is comfortable, but when the pain comes back, it is an 8/10. Is currently taking extended release Oxycontin for pain.     As per daughter, the patient was at [facility] 1 week ago for evaluation of back pain and shortness of breath and when obtaining a CT, found a mass on her breast. Since being discharged from [facility], followed up with oncology and is scheduled to have a biopsy of the breast mass tomorrow. Yesterday, was experiencing worsening back pain, despite taking Oxycodone daily and children state "we could not even touch her anywhere without causing her extreme pain", so they took her to [facility] since that is where her oncologist referred them to. She wound up spending the night at [facility] last night, for pain management and while there was found to have a fracture in her L arm with a lesion, that is going to require surgery. She was discharged from [facility] about 30 minutes prior to this episode of sudden weakness occurring.     The patient's family expresses concern over not having the proper resources to care for her.     Oncology: Dr. Presley ([facility])   Cardiologist: Dr. Zaman (was switched from Plavix to baby aspirin 5 days ago)  PCP: Formerly Dr. Deja Evangelista, first appointment with Dr. Solis this week 84 y/o F with PMHx of aortic stenosis, thrombocytopenia, HLD, HTN and renal insufficiency and PSHx of cardiac stent x2 presents to ED c/o sudden weakness in the DEVAN LE onset today. Children state that while attempting to help the patient get out of the car, they heard a "crack" in her arm, and the patient stated she was unable to move her legs, so they helped her to the ground and activated EMS. Daughter denies the patient falling or hitting her head, or losing consciousness. Currently the patient is comfortable, but when the pain comes back, it is an 8/10. Is currently taking extended release Oxycontin for pain. Patient is denying all other medical complaints at this time.     As per daughter, the patient was at [facility] 1 week ago for evaluation of back pain and shortness of breath and when obtaining a CT, found a mass on her breast. Since being discharged from [facility], followed up with oncology and is scheduled to have a biopsy of the breast mass tomorrow. Yesterday, was experiencing worsening back pain, despite taking Oxycodone daily and children state "we could not even touch her anywhere without causing her extreme pain", so they took her to [facility] since that is where her oncologist referred them to. She wound up spending the night at [facility] last night, for pain management and while there was found to have a fracture in her L arm with a lesion, that is going to require surgery. She was discharged from [facility] about 30 minutes prior to this episode of sudden weakness occurring.     The patient's family expresses concern over not having the proper resources to care for her.     Oncology: Dr. Presley ([facility])   Cardiologist: Dr. Zaman (was switched from Plavix to baby aspirin 5 days ago)  PCP: Formerly Dr. Deja Evangelista, first appointment with Dr. Solis this week

## 2019-01-14 NOTE — ED PROVIDER NOTE - CARE PLAN
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

## 2019-01-14 NOTE — ED ADULT NURSE NOTE - NSIMPLEMENTINTERV_GEN_ALL_ED
Implemented All Fall with Harm Risk Interventions:  [place] to call system. Call bell, personal items and telephone within reach. Instruct patient to call for assistance. Room bathroom lighting operational. Non-slip footwear when patient is off stretcher. Physically safe environment: no spills, clutter or unnecessary equipment. Stretcher in lowest position, wheels locked, appropriate side rails in place. Provide visual cue, wrist band, yellow gown, etc. Monitor gait and stability. Monitor for mental status changes and reorient to person, place, and time. Review medications for side effects contributing to fall risk. Reinforce activity limits and safety measures with patient and family. Provide visual clues: red socks.

## 2019-01-14 NOTE — ED ADULT NURSE NOTE - CHIEF COMPLAINT QUOTE
Patient BIBA, as per daughter patient was at [facility] today for pain control, hx of bone CA, scheduled for biopsy and on hormone therapy, when patient got home and out of car patient became weak in both legs, daughter also states patient has new fx to left arm.  as per daughter patient never passed out or actually fell to floor, was being held up by daughter and son.

## 2019-01-14 NOTE — ED ADULT TRIAGE NOTE - CHIEF COMPLAINT QUOTE
Patient BIBA, as per daughter patient was at [facility] today for pain control, hx of bone CA, scheduled for biopsy and on hormone therapy, when patient got home and out of car patient became weak in both legs, daughter also states patient has new fx to left arm.  as per daughter patient never passed out or actually fell to floor, was being held up by daughter and son.

## 2019-01-14 NOTE — ED PROVIDER NOTE - MUSCULOSKELETAL, MLM
Spine appears normal. Deformity and tenderness to palpation to mid L humerus, limited ROM secondary to sling and pain. MSK otherwise normal, patient able to move LE extremities.

## 2019-01-15 DIAGNOSIS — I21.4 NON-ST ELEVATION (NSTEMI) MYOCARDIAL INFARCTION: ICD-10-CM

## 2019-01-15 LAB
ANION GAP SERPL CALC-SCNC: 24 MMOL/L — HIGH (ref 5–17)
BUN SERPL-MCNC: 31 MG/DL — HIGH (ref 8–20)
CALCIUM SERPL-MCNC: 9.9 MG/DL — SIGNIFICANT CHANGE UP (ref 8.6–10.2)
CHLORIDE SERPL-SCNC: 98 MMOL/L — SIGNIFICANT CHANGE UP (ref 98–107)
CO2 SERPL-SCNC: 16 MMOL/L — LOW (ref 22–29)
CREAT SERPL-MCNC: 1.5 MG/DL — HIGH (ref 0.5–1.3)
GLUCOSE SERPL-MCNC: 111 MG/DL — SIGNIFICANT CHANGE UP (ref 70–115)
HCT VFR BLD CALC: 36.2 % — LOW (ref 37–47)
HGB BLD-MCNC: 12.2 G/DL — SIGNIFICANT CHANGE UP (ref 12–16)
LIDOCAIN IGE QN: 98 U/L — HIGH (ref 22–51)
MCHC RBC-ENTMCNC: 32.3 PG — HIGH (ref 27–31)
MCHC RBC-ENTMCNC: 33.7 G/DL — SIGNIFICANT CHANGE UP (ref 32–36)
MCV RBC AUTO: 95.8 FL — SIGNIFICANT CHANGE UP (ref 81–99)
PLATELET # BLD AUTO: 123 K/UL — LOW (ref 150–400)
POTASSIUM SERPL-MCNC: 5.4 MMOL/L — HIGH (ref 3.5–5.3)
POTASSIUM SERPL-SCNC: 5.4 MMOL/L — HIGH (ref 3.5–5.3)
RBC # BLD: 3.78 M/UL — LOW (ref 4.4–5.2)
RBC # FLD: 14.4 % — SIGNIFICANT CHANGE UP (ref 11–15.6)
SODIUM SERPL-SCNC: 138 MMOL/L — SIGNIFICANT CHANGE UP (ref 135–145)
TROPONIN T SERPL-MCNC: 0.08 NG/ML — HIGH (ref 0–0.06)
WBC # BLD: 13.7 K/UL — HIGH (ref 4.8–10.8)
WBC # FLD AUTO: 13.7 K/UL — HIGH (ref 4.8–10.8)

## 2019-01-15 PROCEDURE — 71275 CT ANGIOGRAPHY CHEST: CPT | Mod: 26

## 2019-01-15 PROCEDURE — 99223 1ST HOSP IP/OBS HIGH 75: CPT

## 2019-01-15 PROCEDURE — 74177 CT ABD & PELVIS W/CONTRAST: CPT | Mod: 26

## 2019-01-15 PROCEDURE — 99223 1ST HOSP IP/OBS HIGH 75: CPT | Mod: 57

## 2019-01-15 PROCEDURE — 70450 CT HEAD/BRAIN W/O DYE: CPT | Mod: 26

## 2019-01-15 PROCEDURE — 93010 ELECTROCARDIOGRAM REPORT: CPT

## 2019-01-15 RX ORDER — LETROZOLE 2.5 MG/1
2.5 TABLET, FILM COATED ORAL DAILY
Qty: 0 | Refills: 0 | Status: DISCONTINUED | OUTPATIENT
Start: 2019-01-15 | End: 2019-01-18

## 2019-01-15 RX ORDER — SODIUM POLYSTYRENE SULFONATE 4.1 MEQ/G
15 POWDER, FOR SUSPENSION ORAL ONCE
Qty: 0 | Refills: 0 | Status: COMPLETED | OUTPATIENT
Start: 2019-01-15 | End: 2019-01-15

## 2019-01-15 RX ORDER — CARVEDILOL PHOSPHATE 80 MG/1
1 CAPSULE, EXTENDED RELEASE ORAL
Qty: 0 | Refills: 0 | COMMUNITY

## 2019-01-15 RX ORDER — ACETAMINOPHEN 500 MG
650 TABLET ORAL EVERY 6 HOURS
Qty: 0 | Refills: 0 | Status: DISCONTINUED | OUTPATIENT
Start: 2019-01-15 | End: 2019-01-17

## 2019-01-15 RX ORDER — OXYCODONE HYDROCHLORIDE 5 MG/1
5 TABLET ORAL EVERY 4 HOURS
Qty: 0 | Refills: 0 | Status: DISCONTINUED | OUTPATIENT
Start: 2019-01-15 | End: 2019-01-17

## 2019-01-15 RX ORDER — METOPROLOL TARTRATE 50 MG
25 TABLET ORAL DAILY
Qty: 0 | Refills: 0 | Status: DISCONTINUED | OUTPATIENT
Start: 2019-01-15 | End: 2019-01-18

## 2019-01-15 RX ORDER — SODIUM CHLORIDE 9 MG/ML
1000 INJECTION INTRAMUSCULAR; INTRAVENOUS; SUBCUTANEOUS
Qty: 0 | Refills: 0 | Status: DISCONTINUED | OUTPATIENT
Start: 2019-01-15 | End: 2019-01-15

## 2019-01-15 RX ORDER — FUROSEMIDE 40 MG
1 TABLET ORAL
Qty: 0 | Refills: 0 | COMMUNITY

## 2019-01-15 RX ORDER — POLYETHYLENE GLYCOL 3350 17 G/17G
17 POWDER, FOR SOLUTION ORAL DAILY
Qty: 0 | Refills: 0 | Status: DISCONTINUED | OUTPATIENT
Start: 2019-01-15 | End: 2019-01-18

## 2019-01-15 RX ORDER — SPIRONOLACTONE 25 MG/1
100 TABLET, FILM COATED ORAL DAILY
Qty: 0 | Refills: 0 | Status: DISCONTINUED | OUTPATIENT
Start: 2019-01-15 | End: 2019-01-18

## 2019-01-15 RX ORDER — ATORVASTATIN CALCIUM 80 MG/1
40 TABLET, FILM COATED ORAL AT BEDTIME
Qty: 0 | Refills: 0 | Status: DISCONTINUED | OUTPATIENT
Start: 2019-01-15 | End: 2019-01-18

## 2019-01-15 RX ORDER — MORPHINE SULFATE 50 MG/1
2 CAPSULE, EXTENDED RELEASE ORAL EVERY 6 HOURS
Qty: 0 | Refills: 0 | Status: DISCONTINUED | OUTPATIENT
Start: 2019-01-15 | End: 2019-01-17

## 2019-01-15 RX ORDER — DOCUSATE SODIUM 100 MG
100 CAPSULE ORAL
Qty: 0 | Refills: 0 | Status: DISCONTINUED | OUTPATIENT
Start: 2019-01-15 | End: 2019-01-18

## 2019-01-15 RX ORDER — OXYCODONE HYDROCHLORIDE 5 MG/1
10 TABLET ORAL EVERY 4 HOURS
Qty: 0 | Refills: 0 | Status: DISCONTINUED | OUTPATIENT
Start: 2019-01-15 | End: 2019-01-17

## 2019-01-15 RX ORDER — SENNA PLUS 8.6 MG/1
2 TABLET ORAL AT BEDTIME
Qty: 0 | Refills: 0 | Status: DISCONTINUED | OUTPATIENT
Start: 2019-01-15 | End: 2019-01-18

## 2019-01-15 RX ORDER — SODIUM BICARBONATE 1 MEQ/ML
0.24 SYRINGE (ML) INTRAVENOUS
Qty: 150 | Refills: 0 | Status: DISCONTINUED | OUTPATIENT
Start: 2019-01-15 | End: 2019-01-17

## 2019-01-15 RX ADMIN — SODIUM POLYSTYRENE SULFONATE 15 GRAM(S): 4.1 POWDER, FOR SUSPENSION ORAL at 19:10

## 2019-01-15 RX ADMIN — SPIRONOLACTONE 100 MILLIGRAM(S): 25 TABLET, FILM COATED ORAL at 12:47

## 2019-01-15 RX ADMIN — Medication 25 MILLIGRAM(S): at 12:46

## 2019-01-15 RX ADMIN — Medication 100 MILLIGRAM(S): at 22:10

## 2019-01-15 RX ADMIN — Medication 100 MEQ/KG/HR: at 19:11

## 2019-01-15 RX ADMIN — MORPHINE SULFATE 2 MILLIGRAM(S): 50 CAPSULE, EXTENDED RELEASE ORAL at 18:38

## 2019-01-15 RX ADMIN — SENNA PLUS 2 TABLET(S): 8.6 TABLET ORAL at 22:10

## 2019-01-15 RX ADMIN — LETROZOLE 2.5 MILLIGRAM(S): 2.5 TABLET, FILM COATED ORAL at 12:46

## 2019-01-15 RX ADMIN — ATORVASTATIN CALCIUM 40 MILLIGRAM(S): 80 TABLET, FILM COATED ORAL at 22:10

## 2019-01-15 NOTE — H&P ADULT - HISTORY OF PRESENT ILLNESS
History obtained from pt and her daughter/ son at bedside, briefly she is 83y old female with PMHx of HTN, aortic stenosis, chronic thrombocytopenia, CAD stent x 2, CHF, liver cirrhosis, recently diagnosed metastatic cancer likely breast primary but have undergone biopsy, who presents to [facility] ED with weakness for 1 day. She was discharged from [facility] yesterday where she was diagnosed with mid shaft humerus fracture, and general weakness - was placed in sling and discharged to f/u with orthopedist outpatient, her daughter / son while attempting to help the patient get out of the car, they heard a "crack" in her arm with persistent LUE pain and generalized weakness/ malaise hence they activated EMS and Pt was taken by ambulance to [facility] ED. Pt reports left shoulder pain, continuos, 6/10 in severity, with limited ROM. She offers no other complaints but she is very poor historian. No reported fever, chills, nausea, vomiting, headache, dizziness, chest pain, SOB, palpitation. History obtained from pt and her daughter/ son at bedside, briefly she is 83y old female with PMHx of HTN, CKD stage 3, aortic stenosis, chronic thrombocytopenia, CAD stent x 2, CHF, liver cirrhosis, recently diagnosed metastatic cancer likely breast primary but have undergone biopsy, who presents to [facility] ED with weakness for 1 day. She was discharged from [facility] yesterday where she was diagnosed with mid shaft humerus fracture, and general weakness - was placed in sling and discharged to f/u with orthopedist outpatient, her daughter / son while attempting to help the patient get out of the car, they heard a "crack" in her arm with persistent LUE pain and generalized weakness/ malaise hence they activated EMS and Pt was taken by ambulance to [facility] ED. Pt reports left shoulder pain, continuos, 6/10 in severity, with limited ROM. She offers no other complaints but she is very poor historian. No reported fever, chills, nausea, vomiting, headache, dizziness, chest pain, SOB, palpitation.

## 2019-01-15 NOTE — H&P ADULT - ASSESSMENT
She is 83y old female with PMHx of HTN, aortic stenosis, chronic thrombocytopenia, CAD stent x 2, CHF, liver cirrhosis, recently diagnosed metastatic cancer likely breast primary but have not undergone biopsy ([place] [facility] who presents to [facility] ED with persistent LUE pain, limited ROM, general weakness/ malaise. In ED Pt noted to have left humerus fracture. Admitted for left humerus fracture and general weakness/ malaise 2/2 metastatic malignancy.    Left Humeral fracture:   - Seen by ortho, NWB LUE, splint/ sling in place.  - pain control as needed.   - Further plan per ortho on fixation. Possible OR in future per ortho.     Metastatic malignancy likely breast being primary:  - Mets to bone, spine, brain, mediastinum LN, lungs, kidney.   - [place] [facility] [place] was suppose to get biopsy today.   - Will keep on pain medication.   - Will call Dr Presley to discuss further plan.     >Hx HTN- Resume home meds with holding parameters  >Hx HLD- Resume home statins  >Hx CAD- S/p PCI, C/w BB/ Statins, Plavix on hold in case she get biopsy today.   >DVT ppx- Compression device for now. She is 83y old female with PMHx of HTN, aortic stenosis, chronic thrombocytopenia, CAD stent x 2, CHF, liver cirrhosis, recently diagnosed metastatic cancer likely breast primary but have not undergone biopsy (Was suppose to get biopsy today [place] [facility] [place] who presents to [facility] ED with persistent LUE pain, limited ROM, general weakness/ malaise. In ED Pt noted to have left humerus fracture. Admitted for left humerus fracture and general weakness/ malaise 2/2 metastatic malignancy.    >Left Humeral fracture:   - Seen by ortho, NWB LUE, splint/ sling in place.  - pain control as needed.   - Further plan per ortho on fixation. Possible OR in future per ortho.     >Metastatic malignancy likely breast being primary:  - Mets to bone, spine, brain, mediastinum LN, lungs, kidney.   - Pt follows with Dr Presley at [facility] [place] and was suppose to get biopsy today.   - Will keep on pain medication.   - Will call Dr Presley to discuss further plan.     >Troponin elevation/ R/o ACS:  - No CP, SOB or palpitation.   - Get stat EKG, cycle CE/ EKG.  - Discussed with cardiology- no suspicious of acute ischemic event at this time.     >Hx HTN- Resume home meds with holding parameters  >Hx HLD- Resume home statins  >Hx CAD- S/p PCI, C/w BB/ Statins, Plavix on hold in case she get biopsy today.   >DVT ppx- Compression device for now. She is 83y old female with PMHx of HTN, aortic stenosis, chronic thrombocytopenia, CAD stent x 2, CHF, liver cirrhosis, recently diagnosed metastatic cancer likely breast primary but have not undergone biopsy (Was suppose to get biopsy today at [facility] [place]), who presents to [facility] ED with persistent LUE pain, limited ROM, general weakness/ malaise. In ED Pt noted to have left humerus fracture. Admitted for left humerus fracture and general weakness/ malaise 2/2 metastatic malignancy.    >Left Humeral fracture:   - Seen by ortho, NWB LUE, splint/ sling in place.  - pain control as needed.   - Further plan per ortho on fixation. Possible OR in future per ortho.     >Metastatic malignancy likely breast being primary:  - Mets to bone, spine, brain, mediastinum LN, lungs, kidney.   - Pt follows with Dr Presley at [facility] and was suppose to get biopsy today.   - Will keep on pain medication for now.   - Called Dr Presley  [facility] to discuss further plan- message left at reception, awaiting call back.      >Troponin elevation/ R/o ACS:  - No CP, SOB or palpitation.   - Get stat EKG, cycle CE/ EKG.  - Discussed with cardiology- no suspicious of acute ischemic event at this time.     >Hx HTN- Resume home meds with holding parameters  >Hx HLD- Resume home statins  >Hx CAD- S/p PCI, C/w BB/ Statins, Plavix on hold in case she get biopsy today.   >Hx CKD stage 3- Cr at baseline, monitor BMP  >DVT ppx- Compression device for now. She is 83y old female with PMHx of HTN, aortic stenosis, chronic thrombocytopenia, CAD stent x 2, CHF, liver cirrhosis, recently diagnosed metastatic cancer likely breast primary but have not undergone biopsy (Was suppose to get biopsy today at [facility] [place]), who presents to [facility] ED with persistent LUE pain, limited ROM, general weakness/ malaise. In ED Pt noted to have left humerus fracture. Admitted for left humerus fracture and general weakness/ malaise 2/2 metastatic malignancy.    >Pathological Left Humeral fracture:   - Seen by ortho, NWB LUE, splint/ sling in place.  - pain control as needed.   - Further plan per ortho on fixation. Possible OR in future per ortho.     >Metastatic malignancy likely breast being primary:  - Mets to bone, spine, brain, mediastinum LN, lungs, kidney.   - Pt follows with Dr Presley at [facility] [place] and was suppose to get biopsy today.   - Will keep on pain medication for now.   - C/w home Femara  - Called Dr Presley [facility] to discuss further plan- message left at reception, awaiting call back.      >Troponin elevation/ R/o ACS:  - No CP, SOB or palpitation.   - Get stat EKG, cycle CE/ EKG.  - Discussed with cardiology- no suspicious of acute ischemic event at this time.     >Hx HTN- Resume home meds with holding parameters  >Hx HLD- Resume home statins  >Hx CAD- S/p PCI, C/w BB/ Statins, Plavix on hold in case she get biopsy today.   >Hx CKD stage 3- Cr at baseline, monitor BMP  >DVT ppx- Compression device for now. She is 83y old female with PMHx of HTN, aortic stenosis, chronic thrombocytopenia, CAD stent x 2, CHF, liver cirrhosis, recently diagnosed metastatic cancer likely breast primary but have not undergone biopsy ([place] [facility] who presents to [facility] ED with persistent LUE pain, limited ROM, general weakness/ malaise. In ED Pt noted to have left humerus fracture. Admitted for left humerus fracture and general weakness/ malaise 2/2 metastatic malignancy.    >Pathological Left Humeral fracture:   - Seen by ortho, NWB LUE, splint/ sling in place.  - pain control as needed.   - Further plan per ortho on fixation. Possible OR in future per ortho.     >Metastatic malignancy likely breast being primary:  - Mets to bone, spine, brain, mediastinum LN, lungs, kidney.   - Pt [place] [facility] [place] was suppose to get biopsy today.   - Will keep on pain medication for now.   - C/w home Femara  - Called Dr Presley [facility] to discuss further plan- message left at reception, awaiting call back.      >Troponin elevation/ R/o ACS:  - No CP, SOB or palpitation.   - Get stat EKG, cycle CE/ EKG.  - Discussed with cardiology- no suspicious of acute ischemic event at this time.     >Hx HTN- Resume home meds with holding parameters  >Hx HLD- Resume home statins  >Hx CAD- S/p PCI, C/w BB/ Statins, Plavix on hold in case she get biopsy today.   >Hx CKD stage 3- Cr at baseline, monitor BMP  >DVT ppx- Compression device for now.     ADDENDUM:  I spoke with oncologist Dr Presley, Dr Malave, and IR- Per Dr Malave and Sakina breast biopsy is not urgent and can be done as an outpatient but if IR can do it on this admission its ok as well, I spoke with interventional radiologist Dr Ibanez and per IR they do not do breast biopsy here. She is 83y old female with PMHx of HTN, aortic stenosis, chronic thrombocytopenia, CAD stent x 2, CHF, liver cirrhosis, recently diagnosed metastatic cancer likely breast primary but have not undergone biopsy (Was suppose to get [place] today [place] [facility] [place] who presents to [facility] ED with persistent LUE pain, limited ROM, general weakness/ malaise. In ED Pt noted to have left humerus fracture. Admitted for left humerus fracture and general weakness/ malaise 2/2 metastatic malignancy.    >Pathological Left Humeral fracture:   - Seen by ortho, NWB LUE, splint/ sling in place.  - pain control as needed.   - Further plan per ortho on fixation. Possible OR in future per ortho.     >Metastatic malignancy likely breast being primary:  - Mets to bone, spine, brain, mediastinum LN, lungs, kidney.   - Pt [place] with [place] [facility] and was suppose to get biopsy today.   - Will keep on pain medication for now.   - C/w home Femara  - Called Dr Presley [facility] to discuss further plan- message left at reception, awaiting call back.      >Troponin elevation/ R/o ACS:  - No CP, SOB or palpitation.   - Get stat EKG, cycle CE/ EKG.  - Discussed with cardiology- no suspicious of acute ischemic event at this time.     >Hx HTN- Resume home meds with holding parameters  >Hx HLD- Resume home statins  >Hx CAD- S/p PCI, C/w BB/ Statins, Plavix on hold in case she get biopsy today.   >Hx CKD stage 3- Cr at baseline, monitor BMP  >DVT ppx- Compression device for now.     ADDENDUM:  I spoke with oncologist Dr Presley, Dr Malave, and IR- Per Dr Benavides breast biopsy is not urgent and can be done as an outpatient but if IR can do it on this admission its ok as well, I spoke with interventional radiologist Dr Ibanez and per IR they do not do breast biopsy here and needs ot be done by [facility] radiology- Called [facility] radiology and Dr shaniqua drake radiologist is off today- follow tomorrow.

## 2019-01-15 NOTE — CONSULT NOTE ADULT - SUBJECTIVE AND OBJECTIVE BOX
Cardiology Consult    CHIEF COMPLAINT: Non-ST elevation (NSTEMI) myocardial infarction    HISTORY OF PRESENT ILLNESS: TONY FORTE is a 83y old female with a history of essential hypertension,aortic stenosis,thrombocytopenia,coronary stent x 2,breast cancer with mets,renal insufficiency who presents with weakness.She was discharged from [facility] yesterday and became weak getting in her car,her daughter heard a crack in her arm and she was taken by ambulance to [facility] where she is now.Her x-ray reveals a left humerus fracture.She denies chest pain,shortness of breath or palpitations.    PROBLEM LIST:    PAST MEDICAL HISTORY:  Vitamin D deficiency  Thrombocytopenia  Renal insufficiency  Portal hypertension  Non-alcoholic fatty liver disease  Hyperparathyroidism  HLD (hyperlipidemia)  Elevated TSH  HTN (hypertension)  Aortic stenosis  Cirrhosis of liver  NAFLD (nonalcoholic fatty liver disease)  White coat syndrome without diagnosis of hypertension    PAST SURGICAL HISTORY:  S/P coronary angiogram  S/P cataract surgery  S/P cholecystectomy  S/P ANURAG-BSO  History of appendectomy    MEDICATIONS  (STANDING):  clopidogrel Tablet 75 milliGRAM(s) Oral daily    MEDICATIONS  (PRN):    ALLERGIES:  No Known Allergies    SOCIAL HISTORY:  Tobacco: no   Alcohol: no  Drugs: no    FAMILY HISTORY:  No pertinent family history in first degree relatives    REVIEW OF SYSTEMS:  Constitutional: has fatigue, no fevers/chills, no weight change  HEENT: no visual disturbances, no hearing loss  Cardiac: no chest pain, no palpitations, no orthopnea, no PND  Respiratory: no shortness of breath, no cough  GI: no abdominal pain, no blood in the stool, no N/V, no diarrhea  Urological: no dysuria, no hematuria  Hematological: no easy bruising or bleeding  Musculoskeletal: general joint pain, has back pain  Neurological: no headaches, no syncope  Psychiatric: no anxiety, no depression  Skin: no rashes    PHYSICAL EXAM:    T(C): 36.5 (01-15-19 @ 02:52), Max: 36.5 (01-15-19 @ 02:52)  T(F): 97.7 (01-15-19 @ 02:52), Max: 97.7 (01-15-19 @ 02:52)  HR: 91 (01-15-19 @ 06:27) (77 - 100)  BP: 104/58 (01-15-19 @ 06:27) (104/58 - 122/67)  RR: 18 (01-15-19 @ 06:27) (18 - 20)  SpO2: 98% (01-15-19 @ 06:27) (96% - 98%)  Wt(kg): --  Telemetry: sinus rhythm  General: comfortable, in NAD  HEENT: EOMI, normocephalic, atraumatic  Neck: no JVD, no carotid bruits  Heart: +S1S2, RRR, 2/6 systolic murmur at the aortic focus no rubs, no gallops  Lungs: clear to auscultation bilaterally, no wheezes, no rales, no rhonchi  Abdomen: soft, non-tender, non-distended, + bowel sounds  Extremities: no clubbing, no cyanosis, no edema  Vascular: 2+ dorsalis pedis pulses bilaterally  Neuro: A&O x3    EKG: pending    LABS:  Serum Pro-Brain Natriuretic Peptide: 4768 pg/mL ( @ 20:58)    Troponin T, Serum: 0.09 ng/mL ( @ 20:58)                            12.4   9.6   )-----------( 127      ( 2019 20:58 )             36.2         131<L>  |  103  |  35.0<H>  ----------------------------<  155<H>  5.2   |  17.0<L>  |  1.40<H>    Ca    10.1      2019 20:58  Mg     2.2         TPro  5.7<L>  /  Alb  2.6<L>  /  TBili  4.1<H>  /  DBili  x   /  AST  139<H>  /  ALT  57<H>  /  AlkPhos  324<H>      PT/INR - ( 2019 22:02 )   PT: 14.7 sec;   INR: 1.27 ratio           RADIOLOGY & ADDITIONAL TESTS:    ASSESSMENT:  TONY FORTE is a 83y old female with a history of essential hypertension,aortic stenosis,thrombocytopenia,coronary stent x 2,breast cancer with bone mets,renal insufficiency who presented with weakness and left humerus fracture.She has been seen by orthopedics.    Please permit me to suggest the followin. She was to have a breast biopsy today at [facility] in [place].Her Plavix is on hold and arrangements will be made to perform biopsy here.  2.Continue treatment as per orthopedics  3.Follow renal function closely

## 2019-01-15 NOTE — CONSULT NOTE ADULT - SUBJECTIVE AND OBJECTIVE BOX
Pt Name: TONY FORTE    MRN: [id number]    82 y/o F with PMHx of aortic stenosis, thrombocytopenia, HLD, HTN and renal insufficiency and PSHx of cardiac stent x2, recent diagnosis of Breast Ca with bony metastases. Pt daughter at bedside, says patient :heard a crack" in her arm when she was attempting to get out of car, denies trauma, fall, LOC. Pt was seen at [facility] yesterday and diagnosed with mid shaft humerus fracture, and general weakness - was placed in sling and discharged to f/u with orthopedist outpatient. Pt comes to [facility] ED for continuing LUE pain and general weakness/malaise. Pt denies any fall, trauma, numbness or tingling in the extremity, new onset of weakness distally in the extremity. No other orthopedic complaints at this time. Pt and daughter wish to pursue surgical correction of the humerus. Oncologist Dr. Presley ([facility]), Cardiologist Dr. Zaman ([facility]).    REVIEW OF SYSTEMS    General: Alert, responsive, in NAD    Skin/Breast: No rashes, no pruritis   	  Ophthalmologic: No visual changes. No redness.   	  ENMT:	No discharge. No swelling.    Respiratory and Thorax: No difficulty breathing. No cough.  	   Cardiovascular:	No chest pain. No palpitations.    Gastrointestinal:	 No abdominal pain. No diarrhea.     Genitourinary: No dysuria. No bleeding.    Musculoskeletal: SEE HPI.    Neurological: No sensory or motor changes.     Psychiatric: No anxiety or depression.    Hematology/Lymphatics: No swelling.    Endocrine: No Hx of diabetes.    ROS is otherwise negative.    PAST MEDICAL & SURGICAL HISTORY:  PAST MEDICAL & SURGICAL HISTORY:  Vitamin D deficiency  Thrombocytopenia  Renal insufficiency  Portal hypertension  Non-alcoholic fatty liver disease  Hyperparathyroidism  HLD (hyperlipidemia)  Elevated TSH  HTN (hypertension)  Aortic stenosis  Cirrhosis of liver  NAFLD (nonalcoholic fatty liver disease)  White coat syndrome without diagnosis of hypertension  S/P coronary angiogram: feb 2018  S/P cataract surgery  S/P cholecystectomy  S/P ANURAG-BSO  History of appendectomy      Allergies: No Known Allergies      Medications: clopidogrel Tablet 75 milliGRAM(s) Oral daily      FAMILY HISTORY:  No pertinent family history in first degree relatives  : non-contributory    Social History:                12.4   9.6   )-----------( 127      ( 14 Jan 2019 20:58 )             36.2       01-14    131<L>  |  103  |  35.0<H>  ----------------------------<  155<H>  5.2   |  17.0<L>  |  1.40<H>    Ca    10.1      14 Jan 2019 20:58  Mg     2.2     01-14    TPro  5.7<L>  /  Alb  2.6<L>  /  TBili  4.1<H>  /  DBili  x   /  AST  139<H>  /  ALT  57<H>  /  AlkPhos  324<H>  01-14      Vital Signs Last 24 Hrs  T(C): 36.5 (15 Ronni 2019 02:52), Max: 36.5 (15 Ronni 2019 02:52)  T(F): 97.7 (15 Ronni 2019 02:52), Max: 97.7 (15 Ronni 2019 02:52)  HR: 100 (15 Ronni 2019 02:52) (77 - 100)  BP: 122/59 (15 Ronni 2019 02:52) (122/59 - 122/67)  BP(mean): --  RR: 20 (15 Ronni 2019 02:52) (18 - 20)  SpO2: 96% (14 Jan 2019 18:12) (96% - 96%)    Daily Height in cm: 160.02 (14 Jan 2019 18:12)    Daily       PHYSICAL EXAM:      Appearance: Alert, responsive, in no acute distress.    Neurological: Sensation is grossly intact to light touch. No focal deficits or weaknesses found.    Skin: no rash on visible skin. Skin is clean, dry and intact.    Vascular: 2+ distal pulses. Cap refill < 2 sec. No cyanosis.    Musculoskeletal:         Left Upper Extremity: mild effusion, varying stages of ecchymosis, tender to palpation and manipulation of extremity, EPL/FPL+ ROM digits+ wrist flex/ext+, shoulder/elbow ROM limited due to pain, SILT, RP 2+, BCR, no cyanosis, compartments soft    Imaging Studies:    XRAY left humerus: midshaft humerus fracture - pending official read    A/P:  Pt is a  83y Female with found to have left humerus mid shaft pathologic fx    PLAN:   * Admit to medicine  * Pain Control  * NWB LUE - cont. coaptation splint and sling  * Possible OR for surgical fixation in future  * d/w Dr. Wolfe

## 2019-01-15 NOTE — H&P ADULT - NSHPOUTPATIENTPROVIDERS_GEN_ALL_CORE
PMD: Dr Evangelista  Cardiology: Dr Piña  Oncology: Dr Presley, [facility]

## 2019-01-15 NOTE — ED ADULT NURSE REASSESSMENT NOTE - NS ED NURSE REASSESS COMMENT FT1
pt with family at bedside. pt assisted with bedpan during night. pt explained plan of care. pt reoriented as needed. will continue to monitor.

## 2019-01-15 NOTE — H&P ADULT - NSHPPHYSICALEXAM_GEN_ALL_CORE
PHYSICAL EXAM:    Vital Signs Last 24 Hrs  T(C): 36.5 (15 Ronni 2019 02:52), Max: 36.5 (15 Ronni 2019 02:52)  T(F): 97.7 (15 Ronni 2019 02:52), Max: 97.7 (15 Ronni 2019 02:52)  HR: 91 (15 Ronni 2019 06:27) (77 - 100)  BP: 104/58 (15 Ronni 2019 06:27) (104/58 - 122/67)  BP(mean): --  RR: 18 (15 Ronni 2019 06:27) (18 - 20)  SpO2: 98% (15 Ronni 2019 06:27) (96% - 98%)    GENERAL: Pt lying comfortably, NAD.  ENMT: PERRL, +EOMI.  NECK: soft, Supple, No JVD,   CHEST/LUNG: Clear to auscultation bilaterally; No wheezing.  HEART: S1S2+, Regular rate and rhythm; No murmurs.  ABDOMEN: Soft, Nontender, Nondistended; Bowel sounds present.  MUSCULOSKELETAL: Limited ROM left shoulder. Deformity and tenderness to palpation to mid L humerus,   SKIN: No rashes or lesions.  NEURO: AAOX3, no focal deficits, no motor r sensory loss.  PSYCH: normal mood.

## 2019-01-15 NOTE — ED ADULT NURSE REASSESSMENT NOTE - NS ED NURSE REASSESS COMMENT FT1
per MD, bladder scan performed @ bedside, 850mL urine resulted. per MD, chawla inserted without difficulty. dark yellow, te urine draining without diff. will contnue to monitor.

## 2019-01-15 NOTE — H&P ADULT - NSHPLABSRESULTS_GEN_ALL_CORE
LABS:                        12.4   9.6   )-----------( 127      ( 14 Jan 2019 20:58 )             36.2     01-14    131<L>  |  103  |  35.0<H>  ----------------------------<  155<H>  5.2   |  17.0<L>  |  1.40<H>    Ca    10.1      14 Jan 2019 20:58  Mg     2.2     01-14    TPro  5.7<L>  /  Alb  2.6<L>  /  TBili  4.1<H>  /  DBili  x   /  AST  139<H>  /  ALT  57<H>  /  AlkPhos  324<H>  01-14    PT/INR - ( 14 Jan 2019 22:02 )   PT: 14.7 sec;   INR: 1.27 ratio             LIVER FUNCTIONS - ( 14 Jan 2019 20:58 )  Alb: 2.6 g/dL / Pro: 5.7 g/dL / ALK PHOS: 324 U/L / ALT: 57 U/L / AST: 139 U/L / GGT: x               CARDIAC MARKERS ( 14 Jan 2019 20:58 )  x     / 0.09 ng/mL / x     / x     / x

## 2019-01-15 NOTE — ED ADULT NURSE REASSESSMENT NOTE - COMFORT CARE
side rails up/warm blanket provided/darkened lights/plan of care explained/po fluids offered/repositioned

## 2019-01-15 NOTE — CONSULT NOTE ADULT - ATTENDING COMMENTS
Ortho Trauma Attending:  Agree with above PA note.  Note edited where necessary.  Patient has pathologic midshaft left humerus fracture. Fracture can be treated with IMN for palliative stabilization. Will need to coordinate with Medicine/Oncology/Rad Oncology about timing of surgery vs chemo/radiation treatment.  If no plans for chemo/radiation in the next week or two, would plan for IMN later this week pending medical optimization.      Shalom Wolfe MD  Orthopaedic Trauma Surgery

## 2019-01-16 LAB
ANION GAP SERPL CALC-SCNC: 10 MMOL/L — SIGNIFICANT CHANGE UP (ref 5–17)
BUN SERPL-MCNC: 35 MG/DL — HIGH (ref 8–20)
CALCIUM SERPL-MCNC: 9.3 MG/DL — SIGNIFICANT CHANGE UP (ref 8.6–10.2)
CHLORIDE SERPL-SCNC: 104 MMOL/L — SIGNIFICANT CHANGE UP (ref 98–107)
CO2 SERPL-SCNC: 19 MMOL/L — LOW (ref 22–29)
CREAT SERPL-MCNC: 1.68 MG/DL — HIGH (ref 0.5–1.3)
GLUCOSE SERPL-MCNC: 110 MG/DL — SIGNIFICANT CHANGE UP (ref 70–115)
HCT VFR BLD CALC: 31.6 % — LOW (ref 37–47)
HGB BLD-MCNC: 10.5 G/DL — LOW (ref 12–16)
MCHC RBC-ENTMCNC: 31.7 PG — HIGH (ref 27–31)
MCHC RBC-ENTMCNC: 33.2 G/DL — SIGNIFICANT CHANGE UP (ref 32–36)
MCV RBC AUTO: 95.5 FL — SIGNIFICANT CHANGE UP (ref 81–99)
PLATELET # BLD AUTO: 115 K/UL — LOW (ref 150–400)
POTASSIUM SERPL-MCNC: 4.6 MMOL/L — SIGNIFICANT CHANGE UP (ref 3.5–5.3)
POTASSIUM SERPL-SCNC: 4.6 MMOL/L — SIGNIFICANT CHANGE UP (ref 3.5–5.3)
RBC # BLD: 3.31 M/UL — LOW (ref 4.4–5.2)
RBC # FLD: 14.4 % — SIGNIFICANT CHANGE UP (ref 11–15.6)
SODIUM SERPL-SCNC: 133 MMOL/L — LOW (ref 135–145)
WBC # BLD: 18.2 K/UL — HIGH (ref 4.8–10.8)
WBC # FLD AUTO: 18.2 K/UL — HIGH (ref 4.8–10.8)

## 2019-01-16 PROCEDURE — 99232 SBSQ HOSP IP/OBS MODERATE 35: CPT

## 2019-01-16 PROCEDURE — 73552 X-RAY EXAM OF FEMUR 2/>: CPT | Mod: 26,50

## 2019-01-16 PROCEDURE — 99232 SBSQ HOSP IP/OBS MODERATE 35: CPT | Mod: 57

## 2019-01-16 RX ORDER — ASPIRIN/CALCIUM CARB/MAGNESIUM 324 MG
81 TABLET ORAL DAILY
Qty: 0 | Refills: 0 | Status: DISCONTINUED | OUTPATIENT
Start: 2019-01-16 | End: 2019-01-18

## 2019-01-16 RX ADMIN — OXYCODONE HYDROCHLORIDE 10 MILLIGRAM(S): 5 TABLET ORAL at 14:20

## 2019-01-16 RX ADMIN — ATORVASTATIN CALCIUM 40 MILLIGRAM(S): 80 TABLET, FILM COATED ORAL at 21:59

## 2019-01-16 RX ADMIN — MORPHINE SULFATE 2 MILLIGRAM(S): 50 CAPSULE, EXTENDED RELEASE ORAL at 06:30

## 2019-01-16 RX ADMIN — Medication 100 MILLIGRAM(S): at 17:23

## 2019-01-16 RX ADMIN — Medication 81 MILLIGRAM(S): at 11:48

## 2019-01-16 RX ADMIN — OXYCODONE HYDROCHLORIDE 10 MILLIGRAM(S): 5 TABLET ORAL at 13:27

## 2019-01-16 RX ADMIN — SPIRONOLACTONE 100 MILLIGRAM(S): 25 TABLET, FILM COATED ORAL at 06:08

## 2019-01-16 RX ADMIN — LETROZOLE 2.5 MILLIGRAM(S): 2.5 TABLET, FILM COATED ORAL at 11:48

## 2019-01-16 RX ADMIN — MORPHINE SULFATE 2 MILLIGRAM(S): 50 CAPSULE, EXTENDED RELEASE ORAL at 06:23

## 2019-01-16 RX ADMIN — Medication 25 MILLIGRAM(S): at 06:08

## 2019-01-16 RX ADMIN — SENNA PLUS 2 TABLET(S): 8.6 TABLET ORAL at 21:59

## 2019-01-16 RX ADMIN — Medication 100 MILLIGRAM(S): at 06:08

## 2019-01-16 NOTE — PROGRESS NOTE ADULT - SUBJECTIVE AND OBJECTIVE BOX
TONY JANN    [id number]    History: 83y Female with L midshaft humerus fx. Patient is doing well and is comfortable. The patient's pain is controlled using the prescribed pain medications. The patient is mildly lethargic secondary to resent pain medication but arousable. Denies nausea, vomiting, chest pain, shortness of breath, abdominal pain or fever. No new complaints.                            10.5   18.2  )-----------( 115      ( 16 Jan 2019 07:32 )             31.6     01-16    133<L>  |  104  |  35.0<H>  ----------------------------<  110  4.6   |  19.0<L>  |  1.68<H>    Ca    9.3      16 Jan 2019 07:32  Mg     2.2     01-14    TPro  5.7<L>  /  Alb  2.6<L>  /  TBili  4.1<H>  /  DBili  x   /  AST  139<H>  /  ALT  57<H>  /  AlkPhos  324<H>  01-14      MEDICATIONS  (STANDING):  aspirin enteric coated 81 milliGRAM(s) Oral daily  atorvastatin 40 milliGRAM(s) Oral at bedtime  docusate sodium 100 milliGRAM(s) Oral two times a day  letrozole 2.5 milliGRAM(s) Oral daily  metoprolol succinate ER 25 milliGRAM(s) Oral daily  senna 2 Tablet(s) Oral at bedtime  sodium bicarbonate  Infusion 0.238 mEq/kG/Hr (100 mL/Hr) IV Continuous <Continuous>  spironolactone 100 milliGRAM(s) Oral daily    MEDICATIONS  (PRN):  acetaminophen   Tablet .. 650 milliGRAM(s) Oral every 6 hours PRN Temp greater or equal to 38C (100.4F), Mild Pain (1 - 3)  morphine  - Injectable 2 milliGRAM(s) IV Push every 6 hours PRN breakthrough  oxyCODONE    IR 5 milliGRAM(s) Oral every 4 hours PRN Moderate Pain (4 - 6)  oxyCODONE    IR 10 milliGRAM(s) Oral every 4 hours PRN Severe Pain (7 - 10)  polyethylene glycol 3350 17 Gram(s) Oral daily PRN Constipation      Physical exam: Physical exam limited secondary to lethargy    LUE: Raphael brace in place. No erythema is noted. No blistering. No ecchymosis. Sensation to light touch is grossly intact distally. +ROM at wrist/fingers. No wrist drop. 2+ radial pulse. Capillary refill is less than 2 seconds.     Primary Orthopedic Assessment:  • 83y Female with L midshaft humerus fx    Secondary  Orthopedic Assessment(s):   •     Secondary  Medical Assessment(s):   •     Plan:   • Pain control as clinically indicated  • DVT prophylaxis as prescribed, including use of compression devices and ankle pumps  • Non-weightbearing of the left upper extremity, Raphael brace at all times.  • Pre-op for OR 1/18/19 pending clearance.

## 2019-01-16 NOTE — PROGRESS NOTE ADULT - ATTENDING COMMENTS
Ortho Trauma Attending:  Agree with above PA note.  Note edited where necessary.  PLanning for OR this friday. f/u medical optimization    Shalom Wolfe MD  Orthopaedic Trauma Surgery

## 2019-01-16 NOTE — PROGRESS NOTE ADULT - SUBJECTIVE AND OBJECTIVE BOX
Patient: TONY FORTE [id number] 83y Female                           Internal Medicine Hospitalist Progress Note    Initial HPI:  83y old female with PMHx of HTN, aortic stenosis, chronic thrombocytopenia, CAD stent x 2, CHF, liver cirrhosis, recently diagnosed metastatic cancer likely breast primary but have not undergone biopsy (Was suppose to get biopsy today at [facility] [place]), who presents to [facility] ED with persistent LUE pain, limited ROM, general weakness/ malaise. In ED Pt noted to have left humerus fracture. Admitted for left humerus fracture and general weakness/ malaise 2/2 metastatic malignancy.    Interval History:  Seen by ortho and cardiology.  Awaiting IM Nailing 1/18. Per son at bedside, pt and family wish to resolve ortho issues, will followup as outpatient to have treatment at [facility] [place].  No complaints.  Pain controlled with current regimen.     ____________________PHYSICAL EXAM:  Vitals reviewed as indicated below  GENERAL:  NAD alert.   HEENT: NCAT  CARDIOVASCULAR:  S1, S2  LUNGS: CTAB  ABDOMEN:  soft, (-) tenderness, (-) distension, (+) bowel sounds, (-) guarding, (-) rebound (-) rigidity  EXTREMITIES:  no cyanosis / clubbing / edema.   ____________________      BACKGROUND:  HEALTH ISSUES - PROBLEM Dx:        Allergies    No Known Allergies    Intolerances      PAST MEDICAL & SURGICAL HISTORY:  Vitamin D deficiency  Thrombocytopenia  Renal insufficiency  Portal hypertension  Non-alcoholic fatty liver disease  Hyperparathyroidism  HLD (hyperlipidemia)  Elevated TSH  HTN (hypertension)  Aortic stenosis  Cirrhosis of liver  NAFLD (nonalcoholic fatty liver disease)  White coat syndrome without diagnosis of hypertension  S/P coronary angiogram: feb 2018  S/P cataract surgery  S/P cholecystectomy  S/P ANURAG-BSO  History of appendectomy        VITALS:  Vital Signs Last 24 Hrs  T(C): 36.4 (16 Jan 2019 15:53), Max: 37.2 (15 Ronni 2019 18:56)  T(F): 97.5 (16 Jan 2019 15:53), Max: 99 (15 Ronni 2019 18:56)  HR: 66 (16 Jan 2019 15:53) (66 - 72)  BP: 99/62 (16 Jan 2019 15:53) (96/64 - 124/75)  BP(mean): --  RR: 18 (16 Jan 2019 15:53) (18 - 18)  SpO2: 98% (16 Jan 2019 07:30) (92% - 98%) Daily     Daily   CAPILLARY BLOOD GLUCOSE        I&O's Summary        LABS:                        10.5   18.2  )-----------( 115      ( 16 Jan 2019 07:32 )             31.6     01-16    133<L>  |  104  |  35.0<H>  ----------------------------<  110  4.6   |  19.0<L>  |  1.68<H>    Ca    9.3      16 Jan 2019 07:32  Mg     2.2     01-14    TPro  5.7<L>  /  Alb  2.6<L>  /  TBili  4.1<H>  /  DBili  x   /  AST  139<H>  /  ALT  57<H>  /  AlkPhos  324<H>  01-14    PT/INR - ( 14 Jan 2019 22:02 )   PT: 14.7 sec;   INR: 1.27 ratio           LIVER FUNCTIONS - ( 14 Jan 2019 20:58 )  Alb: 2.6 g/dL / Pro: 5.7 g/dL / ALK PHOS: 324 U/L / ALT: 57 U/L / AST: 139 U/L / GGT: x             CARDIAC MARKERS ( 15 Ronni 2019 13:29 )  x     / 0.08 ng/mL / x     / x     / x      CARDIAC MARKERS ( 14 Jan 2019 20:58 )  x     / 0.09 ng/mL / x     / x     / x              MEDICATIONS:  MEDICATIONS  (STANDING):  aspirin enteric coated 81 milliGRAM(s) Oral daily  atorvastatin 40 milliGRAM(s) Oral at bedtime  docusate sodium 100 milliGRAM(s) Oral two times a day  letrozole 2.5 milliGRAM(s) Oral daily  metoprolol succinate ER 25 milliGRAM(s) Oral daily  senna 2 Tablet(s) Oral at bedtime  sodium bicarbonate  Infusion 0.238 mEq/kG/Hr (100 mL/Hr) IV Continuous <Continuous>  spironolactone 100 milliGRAM(s) Oral daily    MEDICATIONS  (PRN):  acetaminophen   Tablet .. 650 milliGRAM(s) Oral every 6 hours PRN Temp greater or equal to 38C (100.4F), Mild Pain (1 - 3)  morphine  - Injectable 2 milliGRAM(s) IV Push every 6 hours PRN breakthrough  oxyCODONE    IR 5 milliGRAM(s) Oral every 4 hours PRN Moderate Pain (4 - 6)  oxyCODONE    IR 10 milliGRAM(s) Oral every 4 hours PRN Severe Pain (7 - 10)  polyethylene glycol 3350 17 Gram(s) Oral daily PRN Constipation

## 2019-01-16 NOTE — PROGRESS NOTE ADULT - ASSESSMENT
>Pathological Left Humeral fracture:  Seen by ortho, NWB LUE, splint/ sling in place.  Pain control as needed.  Planning for IM nailing 1/18.  Pt at elevated risk, but no medical contraindication to proposed surgical procedure.    >Metastatic malignancy likely breast being primary.  Pt and family will f/u with Dr. Presley at [facility] [place].  Will d/w ortho whether biopsy is possible during procedure.    >Hx HTN- Resume home meds with holding parameters  >Hx HLD- Resume home statins  >Hx CAD- S/p PCI, C/w BB/ Statins, Plavix on hold in case she get biopsy today.   >Hx CKD stage 3- Cr at baseline, monitor BMP  >DVT ppx- Compression device for now.

## 2019-01-16 NOTE — PROGRESS NOTE ADULT - ASSESSMENT
TONY FORTE is a 83y old female with a history of CAD s/p stent 4/2018, cirrhosis, ascites, chronic systolic heart failure, breast cancer with mets, renal insufficiency who presents with weakness and back pain s/p L humerus fracture.    1. Troponin elevation, non-specific without anginal symptoms. No intervention at this time.  2. s/p LORELEI placement. Plavix on hold for breast biopsy. Would continue holding if plans are made for a biopsy in the near future. If it is in the distant future, would resume Plavix.  3. Continue atorvastatin and would restart aspirin at least. Continue Toprol and spironolactone. Lasix PRN overload. TONY FORTE is a 83y old female with a history of CAD s/p stent 4/2018, cirrhosis, ascites, chronic systolic heart failure, breast cancer with mets, renal insufficiency who presents with weakness and back pain s/p L humerus fracture.    1. Troponin elevation, non-specific without anginal symptoms. No intervention at this time.  2. s/p LORELEI placement. Plavix on hold for breast biopsy. Would continue holding if plans are made for a biopsy in the near future. If it is in the distant future, would resume Plavix.  3. Continue atorvastatin and would restart aspirin at least. Continue Toprol and spironolactone. Lasix PRN overload.  4. Pt is elevated risk for intermediate risk surgery of Left humerus ORIF. No medication changes or interventions recommended to mitigate risk. She is not in decompensated heart failure, severe valvular stenosis or malignant arrhythmia. She is medically optimized for surgery from cardiac standpoint.

## 2019-01-17 ENCOUNTER — TRANSCRIPTION ENCOUNTER (OUTPATIENT)
Age: 84
End: 2019-01-17

## 2019-01-17 PROCEDURE — 99232 SBSQ HOSP IP/OBS MODERATE 35: CPT

## 2019-01-17 PROCEDURE — 99232 SBSQ HOSP IP/OBS MODERATE 35: CPT | Mod: 57

## 2019-01-17 RX ORDER — OXYCODONE AND ACETAMINOPHEN 5; 325 MG/1; MG/1
2 TABLET ORAL EVERY 6 HOURS
Qty: 0 | Refills: 0 | Status: DISCONTINUED | OUTPATIENT
Start: 2019-01-17 | End: 2019-01-18

## 2019-01-17 RX ORDER — OXYCODONE AND ACETAMINOPHEN 5; 325 MG/1; MG/1
1 TABLET ORAL EVERY 6 HOURS
Qty: 0 | Refills: 0 | Status: DISCONTINUED | OUTPATIENT
Start: 2019-01-17 | End: 2019-01-18

## 2019-01-17 RX ORDER — SODIUM CHLORIDE 9 MG/ML
1000 INJECTION INTRAMUSCULAR; INTRAVENOUS; SUBCUTANEOUS
Qty: 0 | Refills: 0 | Status: DISCONTINUED | OUTPATIENT
Start: 2019-01-17 | End: 2019-01-18

## 2019-01-17 RX ADMIN — Medication 25 MILLIGRAM(S): at 05:34

## 2019-01-17 RX ADMIN — OXYCODONE HYDROCHLORIDE 10 MILLIGRAM(S): 5 TABLET ORAL at 06:06

## 2019-01-17 RX ADMIN — Medication 100 MILLIGRAM(S): at 05:34

## 2019-01-17 RX ADMIN — SODIUM CHLORIDE 100 MILLILITER(S): 9 INJECTION INTRAMUSCULAR; INTRAVENOUS; SUBCUTANEOUS at 23:25

## 2019-01-17 RX ADMIN — OXYCODONE HYDROCHLORIDE 10 MILLIGRAM(S): 5 TABLET ORAL at 05:33

## 2019-01-17 NOTE — PROGRESS NOTE ADULT - ASSESSMENT
>Pathological Left Humeral fracture:  Seen by ortho, NWB LUE, splint/ sling in place.  Pain control as needed.  Planning for IM nailing 1/18.  Pt at elevated risk, but no medical contraindication to proposed surgical procedure.     >Metastatic malignancy likely breast being primary.  Pt and family will f/u with Dr. Presley at [facility] [place].  Discussed with ortho possibility of obtaining bone biopsy during the procedure.  >Cancer related pain - would decrease pain control regimen due to somnolence.  D/w son at bedside, in agreement.  >Hx HTN- Resume home meds with holding parameters  >Hx HLD- Resume home statins  >Hx CAD- S/p PCI, C/w BB/ Statins, Plavix on hold in case she get biopsy today.   >Hx CKD stage 3- Cr at baseline, monitor BMP  >DVT ppx- Compression device for now.     I discussed advanced directives with the patient / family - made aware of limited prognosis if patient were to be intubated or resuscitated, in consideration of age and comorbid conditions.  Son agreed with DNR / DNI, wishes to await consent of mother before signing MOLST.

## 2019-01-17 NOTE — PROGRESS NOTE ADULT - SUBJECTIVE AND OBJECTIVE BOX
Pt Name: TONY FORTE    MRN: [id number]      Patient is a 83 year old Female being followed for  L midshaft humerus fx. Patient seen and examined this morning, son at bedside. Patient is doing well and is comfortable. The patient's pain is controlled using the prescribed pain medications. Denies nausea, vomiting, chest pain, shortness of breath, abdominal pain or fever. No new complaints.    PAST MEDICAL & SURGICAL HISTORY:  PAST MEDICAL & SURGICAL HISTORY:  Vitamin D deficiency  Thrombocytopenia  Renal insufficiency  Portal hypertension  Non-alcoholic fatty liver disease  Hyperparathyroidism  HLD (hyperlipidemia)  Elevated TSH  HTN (hypertension)  Aortic stenosis  Cirrhosis of liver  NAFLD (nonalcoholic fatty liver disease)  White coat syndrome without diagnosis of hypertension  S/P coronary angiogram: feb 2018  S/P cataract surgery  S/P cholecystectomy  S/P ANURAG-BSO  History of appendectomy    Allergies: No Known Allergies    Medications: acetaminophen   Tablet .. 650 milliGRAM(s) Oral every 6 hours PRN  aspirin enteric coated 81 milliGRAM(s) Oral daily  atorvastatin 40 milliGRAM(s) Oral at bedtime  docusate sodium 100 milliGRAM(s) Oral two times a day  letrozole 2.5 milliGRAM(s) Oral daily  metoprolol succinate ER 25 milliGRAM(s) Oral daily  morphine  - Injectable 2 milliGRAM(s) IV Push every 6 hours PRN  oxyCODONE    IR 5 milliGRAM(s) Oral every 4 hours PRN  oxyCODONE    IR 10 milliGRAM(s) Oral every 4 hours PRN  polyethylene glycol 3350 17 Gram(s) Oral daily PRN  senna 2 Tablet(s) Oral at bedtime  sodium bicarbonate  Infusion 0.238 mEq/kG/Hr IV Continuous <Continuous>  spironolactone 100 milliGRAM(s) Oral daily                          10.5   18.2  )-----------( 115      ( 16 Jan 2019 07:32 )             31.6     01-16    133<L>  |  104  |  35.0<H>  ----------------------------<  110  4.6   |  19.0<L>  |  1.68<H>    Ca    9.3      16 Jan 2019 07:32        PHYSICAL EXAM:    Vital Signs Last 24 Hrs  T(C): 36.4 (16 Jan 2019 22:00), Max: 36.4 (16 Jan 2019 15:53)  T(F): 97.5 (16 Jan 2019 22:00), Max: 97.5 (16 Jan 2019 15:53)  HR: 96 (17 Jan 2019 05:30) (66 - 96)  BP: 112/55 (17 Jan 2019 05:30) (99/62 - 112/55)  BP(mean): --  RR: 18 (16 Jan 2019 22:00) (18 - 18)  SpO2: 97% (17 Jan 2019 04:20) (97% - 98%)  Daily     Daily     Appearance: Alert, responsive, in no acute distress.    Neurological: Sensation is grossly intact to light touch. 5/5 motor function of all extremities. No focal deficits or weaknesses found.    Skin: no rash on visible skin. Skin is clean, dry and intact. No bleeding. No abrasions. No ulcerations.    Vascular: 2+ distal pulses. Cap refill < 2 sec. No signs of venous   insufficiency   or stasis. No extremity ulcerations. No cyanosis.    Musculoskeletal:         Left Upper Extremity: Raphael brace in place. Extremity in sling. Diffuse ecchymosis noted. +ROM of fingers. Sensation to light touch grossly intact distally. Radial pulse 2+.       A/P:  Pt is a  83y Female with L midshaft humerus fracture    PLAN:    Pain control as clinically indicated   DVT prophylaxis as prescribed, including use of compression devices and ankle pumps   Non-weightbearing of the left upper extremity, Raphael brace at all times.   Medical clearance appreciated   Pre-op for OR 1/18/19 pending clearance. Pt Name: TONY FORTE    MRN: [id number]      Patient is a 83 year old Female being followed for  L midshaft humerus fx. Patient seen and examined this morning, son at bedside. Patient is doing well and is comfortable. The patient's pain is controlled using the prescribed pain medications. Denies nausea, vomiting, chest pain, shortness of breath, abdominal pain or fever. No new complaints.    PAST MEDICAL & SURGICAL HISTORY:  PAST MEDICAL & SURGICAL HISTORY:  Vitamin D deficiency  Thrombocytopenia  Renal insufficiency  Portal hypertension  Non-alcoholic fatty liver disease  Hyperparathyroidism  HLD (hyperlipidemia)  Elevated TSH  HTN (hypertension)  Aortic stenosis  Cirrhosis of liver  NAFLD (nonalcoholic fatty liver disease)  White coat syndrome without diagnosis of hypertension  S/P coronary angiogram: feb 2018  S/P cataract surgery  S/P cholecystectomy  S/P ANURAG-BSO  History of appendectomy    Allergies: No Known Allergies    Medications: acetaminophen   Tablet .. 650 milliGRAM(s) Oral every 6 hours PRN  aspirin enteric coated 81 milliGRAM(s) Oral daily  atorvastatin 40 milliGRAM(s) Oral at bedtime  docusate sodium 100 milliGRAM(s) Oral two times a day  letrozole 2.5 milliGRAM(s) Oral daily  metoprolol succinate ER 25 milliGRAM(s) Oral daily  morphine  - Injectable 2 milliGRAM(s) IV Push every 6 hours PRN  oxyCODONE    IR 5 milliGRAM(s) Oral every 4 hours PRN  oxyCODONE    IR 10 milliGRAM(s) Oral every 4 hours PRN  polyethylene glycol 3350 17 Gram(s) Oral daily PRN  senna 2 Tablet(s) Oral at bedtime  sodium bicarbonate  Infusion 0.238 mEq/kG/Hr IV Continuous <Continuous>  spironolactone 100 milliGRAM(s) Oral daily                          10.5   18.2  )-----------( 115      ( 16 Jan 2019 07:32 )             31.6     01-16    133<L>  |  104  |  35.0<H>  ----------------------------<  110  4.6   |  19.0<L>  |  1.68<H>    Ca    9.3      16 Jan 2019 07:32        PHYSICAL EXAM:    Vital Signs Last 24 Hrs  T(C): 36.4 (16 Jan 2019 22:00), Max: 36.4 (16 Jan 2019 15:53)  T(F): 97.5 (16 Jan 2019 22:00), Max: 97.5 (16 Jan 2019 15:53)  HR: 96 (17 Jan 2019 05:30) (66 - 96)  BP: 112/55 (17 Jan 2019 05:30) (99/62 - 112/55)  BP(mean): --  RR: 18 (16 Jan 2019 22:00) (18 - 18)  SpO2: 97% (17 Jan 2019 04:20) (97% - 98%)  Daily     Daily     Appearance: Alert, responsive, in no acute distress.    Musculoskeletal:         Left Upper Extremity: Raphael brace in place. Extremity in sling. Diffuse ecchymosis noted. +ROM of fingers. Sensation to light touch grossly intact distally. Radial pulse 2+.       A/P:  Pt is a  83y Female with L midshaft humerus fracture    PLAN:    Pain control as clinically indicated   DVT prophylaxis as prescribed, including use of compression devices and ankle pumps   Non-weightbearing of the left upper extremity, Raphael brace at all times.   Medical clearance appreciated   Pre-op for OR 1/18/19

## 2019-01-17 NOTE — PROGRESS NOTE ADULT - SUBJECTIVE AND OBJECTIVE BOX
Patient: TONY FORTE [id number] 83y Female                           Internal Medicine Hospitalist Progress Note    Initial HPI:  83y old female with PMHx of HTN, aortic stenosis, chronic thrombocytopenia, CAD stent x 2, CHF, liver cirrhosis, recently diagnosed metastatic cancer likely breast primary but have not undergone biopsy (Was suppose to get biopsy today [place] [facility] [place] who presents to [facility] ED with persistent LUE pain, limited ROM, general weakness/ malaise. In ED Pt noted to have left humerus fracture. Admitted for left humerus fracture and general weakness/ malaise 2/2 metastatic malignancy.    Interval History:  Awaiting IMN in am.  Son at bedside reports pt more somnolent today after receiving pain meds.  States she has reported "10/10" arm pain at site of fracture, requiring pain medication.  She denied weakness / numbness at that time.     ____________________PHYSICAL EXAM:  Vitals reviewed as indicated below  GENERAL:  NAD somnolent, moves 4 extremities to voice.    HEENT: NCAT  CARDIOVASCULAR:  S1, S2  LUNGS: CTAB  ABDOMEN:  soft, (-) tenderness, (-) distension, (+) bowel sounds, (-) guarding, (-) rebound (-) rigidity  EXTREMITIES:  no cyanosis / clubbing / edema.   ____________________     VITALS:  Vital Signs Last 24 Hrs  T(C): 36.9 (17 Jan 2019 10:34), Max: 36.9 (17 Jan 2019 10:34)  T(F): 98.4 (17 Jan 2019 10:34), Max: 98.4 (17 Jan 2019 10:34)  HR: 79 (17 Jan 2019 10:34) (66 - 96)  BP: 105/51 (17 Jan 2019 10:34) (99/62 - 112/55)  BP(mean): --  RR: 18 (16 Jan 2019 22:00) (18 - 18)  SpO2: 88% (17 Jan 2019 10:34) (88% - 98%) Daily     Daily   CAPILLARY BLOOD GLUCOSE        I&O's Summary    16 Jan 2019 07:01  -  17 Jan 2019 07:00  --------------------------------------------------------  IN: 0 mL / OUT: 350 mL / NET: -350 mL        LABS:                        10.5   18.2  )-----------( 115      ( 16 Jan 2019 07:32 )             31.6     01-16    133<L>  |  104  |  35.0<H>  ----------------------------<  110  4.6   |  19.0<L>  |  1.68<H>    Ca    9.3      16 Jan 2019 07:32            CARDIAC MARKERS ( 15 Ronni 2019 13:29 )  x     / 0.08 ng/mL / x     / x     / x            MEDICATIONS:  aspirin enteric coated 81 milliGRAM(s) Oral daily  atorvastatin 40 milliGRAM(s) Oral at bedtime  docusate sodium 100 milliGRAM(s) Oral two times a day  letrozole 2.5 milliGRAM(s) Oral daily  metoprolol succinate ER 25 milliGRAM(s) Oral daily  oxyCODONE    5 mG/acetaminophen 325 mG 1 Tablet(s) Oral every 6 hours PRN  oxyCODONE    5 mG/acetaminophen 325 mG 2 Tablet(s) Oral every 6 hours PRN  polyethylene glycol 3350 17 Gram(s) Oral daily PRN  senna 2 Tablet(s) Oral at bedtime  spironolactone 100 milliGRAM(s) Oral daily

## 2019-01-17 NOTE — PROGRESS NOTE ADULT - ASSESSMENT
TONY FORTE is a 83y old female with a history of CAD s/p stent 4/2018, cirrhosis, ascites, chronic systolic heart failure, breast cancer with mets, renal insufficiency who presents with weakness and back pain s/p L humerus fracture.    1. Troponin elevation, non-specific without anginal symptoms. No intervention at this time.  2. s/p LORELEI placement. Plavix on hold for breast biopsy. Would continue holding if plans are made for a breast biopsy in the near future. If it is in the distant future, would resume Plavix.  3. Continue atorvastatin and aspirin restarted yesterday. Continue Toprol and spironolactone. Lasix PRN overload.  4. Pt is elevated risk for intermediate risk surgery of Left humerus ORIF or pinning. No medication changes or interventions recommended to mitigate risk. She is not in decompensated heart failure, severe valvular stenosis or malignant arrhythmia. She is medically optimized for surgery from cardiac standpoint. TONY FORTE is a 83y old female with a history of CAD s/p stent 4/2018, cirrhosis, ascites, chronic systolic heart failure, breast cancer with mets, renal insufficiency who presents with weakness and back pain s/p L humerus fracture.    1. Troponin elevation, non-specific without anginal symptoms. No intervention at this time.  2. s/p LORELEI placement. Plavix on hold for breast biopsy. Would continue holding if plans are made for a breast biopsy in the near future. If it is in the distant future, would resume Plavix.  3. Continue atorvastatin and aspirin restarted yesterday. Continue Toprol and spironolactone. Lasix PRN overload.  4. Pt is elevated risk for intermediate risk surgery of Left humerus ORIF or pinning. No medication changes or interventions recommended to mitigate risk. She is not in decompensated heart failure, severe valvular stenosis or malignant arrhythmia. She is medically optimized for surgery from cardiac standpoint.  5. Pain control per primary team. Agree with reducing IV opiates to reduce somnolence.    Thank you for the courtesy of this consult. We will follow as needed. Feel free to call with questions.

## 2019-01-17 NOTE — PROGRESS NOTE ADULT - SUBJECTIVE AND OBJECTIVE BOX
Cardiology Follow-up    TONY FORTE was seen and examined. No events overnight. The patient denies any chest pain, SOB, palpitations, orthopnea, PND or abdominal pain.    PROBLEM LIST:    PAST MEDICAL HISTORY:  Vitamin D deficiency  Thrombocytopenia  Renal insufficiency  Portal hypertension  Non-alcoholic fatty liver disease  Hyperparathyroidism  HLD (hyperlipidemia)  Elevated TSH  HTN (hypertension)  Aortic stenosis  Cirrhosis of liver  NAFLD (nonalcoholic fatty liver disease)  White coat syndrome without diagnosis of hypertension    PAST SURGICAL HISTORY:  S/P coronary angiogram  S/P cataract surgery  S/P cholecystectomy  S/P ANURAG-BSO  History of appendectomy    MEDICATIONS  (STANDING):  aspirin enteric coated 81 milliGRAM(s) Oral daily  atorvastatin 40 milliGRAM(s) Oral at bedtime  docusate sodium 100 milliGRAM(s) Oral two times a day  letrozole 2.5 milliGRAM(s) Oral daily  metoprolol succinate ER 25 milliGRAM(s) Oral daily  senna 2 Tablet(s) Oral at bedtime  spironolactone 100 milliGRAM(s) Oral daily    MEDICATIONS  (PRN):  oxyCODONE    5 mG/acetaminophen 325 mG 1 Tablet(s) Oral every 6 hours PRN mild - moderate pain  oxyCODONE    5 mG/acetaminophen 325 mG 2 Tablet(s) Oral every 6 hours PRN Severe Pain (7 - 10)  polyethylene glycol 3350 17 Gram(s) Oral daily PRN Constipation    ALLERGIES:  No Known Allergies    I&O's Summary    16 Jan 2019 07:01  -  17 Jan 2019 07:00  --------------------------------------------------------  IN: 0 mL / OUT: 350 mL / NET: -350 mL      PHYSICAL EXAM:  T(F): 98.4 (01-17-19 @ 10:34), Max: 98.4 (01-17-19 @ 10:34)  HR: 79 (01-17-19 @ 10:34) (66 - 96)  BP: 105/51 (01-17-19 @ 10:34) (99/62 - 112/55)  RR: 18 (01-16-19 @ 22:00) (18 - 18)  SpO2: 88% (01-17-19 @ 10:34) (88% - 98%)  Wt(kg): --  Weight (kg): 63 (01-14 @ 18:12)  Telemetry: SR  General: NAD, frail and chronically ill appearing  Heart: +S1S2, RRR, 1-2/6 systolic murmur RUSB, no rubs, no gallops  Lungs: clear to auscultation bilaterally, no wheezes, no rales, no rhonchi  Abdomen: soft, +distended, + bowel sounds  Extremities: no clubbing, no cyanosis, no edema  Neuro: sleepy, arouseable      LABS:    Troponin T, Serum: 0.08 ng/mL (01-15 @ 13:29)  Troponin T, Serum: 0.09 ng/mL (01-14 @ 20:58)        CBC:            10.5   18.2  >-----------< 115    01-16 @ 07:32            31.6               12.2   13.7  >-----------< 123    01-15 @ 13:29            36.2               12.4   9.6   >-----------< 127    01-14 @ 20:58            36.2       CHEMISTRY:   133   |  104    |  35.0   ----------------------------< 110     01-16 @ 07:32    4.6   |  19.0   |  1.68     eGFR non-  28     eGFR   32      138   |  98     |  31.0   ----------------------------< 111     01-15 @ 13:29    5.4   |  16.0   |  1.50     eGFR non-  32     eGFR   37      131   |  103    |  35.0   ----------------------------< 155     01-14 @ 20:58    5.2   |  17.0   |  1.40     eGFR non-  35     eGFR   40         TPro --    / Alb 2.6   / TBili 4.1   / DBili --    /    / ALT 57    / AlkPhos --     01-14 @ 20:58        RADIOLOGY & ADDITIONAL TESTS:  1. Large right breast mass compatible with reported history of suspected   breast cancer.  2. Spiculated nodules within the left lung and associated mediastinal and   bilateral hilar adenopathy. Findings may reflect primary lung neoplasm   versus metastatic disease. No pulmonary embolism.  3. Cirrhosis with associated splenomegaly and small ascites. Attenuation   of the right portal vein branch is noted without discrete thrombus   identified on this exam.  4. Wall thickening of the ascending colon may reflect colitis.  5. Multiple  soft tissue densities abutting the bilateral kidneys may   reflect metastatic implants.  6. Pathologic fracture of the left proximal humerus. Diffuse metastatic   disease within the spine with associated pathologic fractures at T8, L1   and L4.    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 Cardiology Follow-up    TONY FORTE was seen and examined. No events overnight. She is somnolent.    PROBLEM LIST:    PAST MEDICAL HISTORY:  Vitamin D deficiency  Thrombocytopenia  Renal insufficiency  Portal hypertension  Non-alcoholic fatty liver disease  Hyperparathyroidism  HLD (hyperlipidemia)  Elevated TSH  HTN (hypertension)  Aortic stenosis  Cirrhosis of liver  NAFLD (nonalcoholic fatty liver disease)  White coat syndrome without diagnosis of hypertension    PAST SURGICAL HISTORY:  S/P coronary angiogram  S/P cataract surgery  S/P cholecystectomy  S/P ANURAG-BSO  History of appendectomy    MEDICATIONS  (STANDING):  aspirin enteric coated 81 milliGRAM(s) Oral daily  atorvastatin 40 milliGRAM(s) Oral at bedtime  docusate sodium 100 milliGRAM(s) Oral two times a day  letrozole 2.5 milliGRAM(s) Oral daily  metoprolol succinate ER 25 milliGRAM(s) Oral daily  senna 2 Tablet(s) Oral at bedtime  spironolactone 100 milliGRAM(s) Oral daily    MEDICATIONS  (PRN):  oxyCODONE    5 mG/acetaminophen 325 mG 1 Tablet(s) Oral every 6 hours PRN mild - moderate pain  oxyCODONE    5 mG/acetaminophen 325 mG 2 Tablet(s) Oral every 6 hours PRN Severe Pain (7 - 10)  polyethylene glycol 3350 17 Gram(s) Oral daily PRN Constipation    ALLERGIES:  No Known Allergies    I&O's Summary    16 Jan 2019 07:01  -  17 Jan 2019 07:00  --------------------------------------------------------  IN: 0 mL / OUT: 350 mL / NET: -350 mL      PHYSICAL EXAM:  T(F): 98.4 (01-17-19 @ 10:34), Max: 98.4 (01-17-19 @ 10:34)  HR: 79 (01-17-19 @ 10:34) (66 - 96)  BP: 105/51 (01-17-19 @ 10:34) (99/62 - 112/55)  RR: 18 (01-16-19 @ 22:00) (18 - 18)  SpO2: 88% (01-17-19 @ 10:34) (88% - 98%)  Wt(kg): --  Weight (kg): 63 (01-14 @ 18:12)  Telemetry: SR  General: NAD, frail and chronically ill appearing  Heart: +S1S2, RRR, 2/6 systolic murmur RUSB, no rubs, no gallops  Lungs: clear to auscultation bilaterally, no wheezes, no rales, no rhonchi  Abdomen: soft, +distended, + bowel sounds  Extremities: no clubbing, no cyanosis, no edema  Neuro: sleepy, arouseable      LABS:    Troponin T, Serum: 0.08 ng/mL (01-15 @ 13:29)  Troponin T, Serum: 0.09 ng/mL (01-14 @ 20:58)        CBC:            10.5   18.2  >-----------< 115    01-16 @ 07:32            31.6               12.2   13.7  >-----------< 123    01-15 @ 13:29            36.2               12.4   9.6   >-----------< 127    01-14 @ 20:58            36.2       CHEMISTRY:   133   |  104    |  35.0   ----------------------------< 110     01-16 @ 07:32    4.6   |  19.0   |  1.68     eGFR non-  28     eGFR   32      138   |  98     |  31.0   ----------------------------< 111     01-15 @ 13:29    5.4   |  16.0   |  1.50     eGFR non-  32     eGFR   37      131   |  103    |  35.0   ----------------------------< 155     01-14 @ 20:58    5.2   |  17.0   |  1.40     eGFR non-  35     eGFR   40         TPro --    / Alb 2.6   / TBili 4.1   / DBili --    /    / ALT 57    / AlkPhos --     01-14 @ 20:58        RADIOLOGY & ADDITIONAL TESTS:  1. Large right breast mass compatible with reported history of suspected   breast cancer.  2. Spiculated nodules within the left lung and associated mediastinal and   bilateral hilar adenopathy. Findings may reflect primary lung neoplasm   versus metastatic disease. No pulmonary embolism.  3. Cirrhosis with associated splenomegaly and small ascites. Attenuation   of the right portal vein branch is noted without discrete thrombus   identified on this exam.  4. Wall thickening of the ascending colon may reflect colitis.  5. Multiple  soft tissue densities abutting the bilateral kidneys may   reflect metastatic implants.  6. Pathologic fracture of the left proximal humerus. Diffuse metastatic   disease within the spine with associated pathologic fractures at T8, L1   and L4.    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

## 2019-01-18 ENCOUNTER — RESULT REVIEW (OUTPATIENT)
Age: 84
End: 2019-01-18

## 2019-01-18 LAB
BLD GP AB SCN SERPL QL: SIGNIFICANT CHANGE UP
TYPE + AB SCN PNL BLD: SIGNIFICANT CHANGE UP

## 2019-01-18 PROCEDURE — 24516 TX HUMRL SHAFT FX IMED IMPLT: CPT | Mod: LT

## 2019-01-18 PROCEDURE — 99232 SBSQ HOSP IP/OBS MODERATE 35: CPT

## 2019-01-18 PROCEDURE — 88341 IMHCHEM/IMCYTCHM EA ADD ANTB: CPT | Mod: 26

## 2019-01-18 PROCEDURE — 20245 BONE BIOPSY OPEN DEEP: CPT | Mod: LT

## 2019-01-18 PROCEDURE — 88342 IMHCHEM/IMCYTCHM 1ST ANTB: CPT | Mod: 26

## 2019-01-18 PROCEDURE — 71045 X-RAY EXAM CHEST 1 VIEW: CPT | Mod: 26

## 2019-01-18 PROCEDURE — 88307 TISSUE EXAM BY PATHOLOGIST: CPT | Mod: 26

## 2019-01-18 PROCEDURE — 88331 PATH CONSLTJ SURG 1 BLK 1SPC: CPT | Mod: 26

## 2019-01-18 RX ORDER — OXYCODONE AND ACETAMINOPHEN 5; 325 MG/1; MG/1
2 TABLET ORAL EVERY 6 HOURS
Qty: 0 | Refills: 0 | Status: DISCONTINUED | OUTPATIENT
Start: 2019-01-18 | End: 2019-01-19

## 2019-01-18 RX ORDER — SODIUM CHLORIDE 9 MG/ML
1000 INJECTION, SOLUTION INTRAVENOUS
Qty: 0 | Refills: 0 | Status: DISCONTINUED | OUTPATIENT
Start: 2019-01-18 | End: 2019-01-18

## 2019-01-18 RX ORDER — POLYETHYLENE GLYCOL 3350 17 G/17G
17 POWDER, FOR SOLUTION ORAL DAILY
Qty: 0 | Refills: 0 | Status: DISCONTINUED | OUTPATIENT
Start: 2019-01-18 | End: 2019-01-24

## 2019-01-18 RX ORDER — CEFAZOLIN SODIUM 1 G
2000 VIAL (EA) INJECTION EVERY 8 HOURS
Qty: 0 | Refills: 0 | Status: COMPLETED | OUTPATIENT
Start: 2019-01-18 | End: 2019-01-19

## 2019-01-18 RX ORDER — FENTANYL CITRATE 50 UG/ML
25 INJECTION INTRAVENOUS
Qty: 0 | Refills: 0 | Status: DISCONTINUED | OUTPATIENT
Start: 2019-01-18 | End: 2019-01-18

## 2019-01-18 RX ORDER — DOCUSATE SODIUM 100 MG
100 CAPSULE ORAL
Qty: 0 | Refills: 0 | Status: DISCONTINUED | OUTPATIENT
Start: 2019-01-18 | End: 2019-01-24

## 2019-01-18 RX ORDER — TAMSULOSIN HYDROCHLORIDE 0.4 MG/1
0.4 CAPSULE ORAL AT BEDTIME
Qty: 0 | Refills: 0 | Status: DISCONTINUED | OUTPATIENT
Start: 2019-01-18 | End: 2019-01-24

## 2019-01-18 RX ORDER — TAMSULOSIN HYDROCHLORIDE 0.4 MG/1
0.4 CAPSULE ORAL AT BEDTIME
Qty: 0 | Refills: 0 | Status: DISCONTINUED | OUTPATIENT
Start: 2019-01-18 | End: 2019-01-18

## 2019-01-18 RX ORDER — OXYCODONE AND ACETAMINOPHEN 5; 325 MG/1; MG/1
1 TABLET ORAL EVERY 6 HOURS
Qty: 0 | Refills: 0 | Status: DISCONTINUED | OUTPATIENT
Start: 2019-01-18 | End: 2019-01-19

## 2019-01-18 RX ORDER — HEPARIN SODIUM 5000 [USP'U]/ML
5000 INJECTION INTRAVENOUS; SUBCUTANEOUS EVERY 12 HOURS
Qty: 0 | Refills: 0 | Status: DISCONTINUED | OUTPATIENT
Start: 2019-01-18 | End: 2019-01-23

## 2019-01-18 RX ORDER — SPIRONOLACTONE 25 MG/1
100 TABLET, FILM COATED ORAL DAILY
Qty: 0 | Refills: 0 | Status: DISCONTINUED | OUTPATIENT
Start: 2019-01-18 | End: 2019-01-24

## 2019-01-18 RX ORDER — METOPROLOL TARTRATE 50 MG
25 TABLET ORAL DAILY
Qty: 0 | Refills: 0 | Status: DISCONTINUED | OUTPATIENT
Start: 2019-01-18 | End: 2019-01-24

## 2019-01-18 RX ORDER — ASPIRIN/CALCIUM CARB/MAGNESIUM 324 MG
81 TABLET ORAL DAILY
Qty: 0 | Refills: 0 | Status: DISCONTINUED | OUTPATIENT
Start: 2019-01-18 | End: 2019-01-23

## 2019-01-18 RX ORDER — ATORVASTATIN CALCIUM 80 MG/1
40 TABLET, FILM COATED ORAL AT BEDTIME
Qty: 0 | Refills: 0 | Status: DISCONTINUED | OUTPATIENT
Start: 2019-01-18 | End: 2019-01-22

## 2019-01-18 RX ORDER — LETROZOLE 2.5 MG/1
2.5 TABLET, FILM COATED ORAL DAILY
Qty: 0 | Refills: 0 | Status: DISCONTINUED | OUTPATIENT
Start: 2019-01-18 | End: 2019-01-24

## 2019-01-18 RX ORDER — SENNA PLUS 8.6 MG/1
2 TABLET ORAL AT BEDTIME
Qty: 0 | Refills: 0 | Status: DISCONTINUED | OUTPATIENT
Start: 2019-01-18 | End: 2019-01-24

## 2019-01-18 RX ORDER — ONDANSETRON 8 MG/1
4 TABLET, FILM COATED ORAL ONCE
Qty: 0 | Refills: 0 | Status: DISCONTINUED | OUTPATIENT
Start: 2019-01-18 | End: 2019-01-18

## 2019-01-18 RX ORDER — SODIUM CHLORIDE 9 MG/ML
1000 INJECTION INTRAMUSCULAR; INTRAVENOUS; SUBCUTANEOUS
Qty: 0 | Refills: 0 | Status: DISCONTINUED | OUTPATIENT
Start: 2019-01-18 | End: 2019-01-19

## 2019-01-18 RX ADMIN — SODIUM CHLORIDE 100 MILLILITER(S): 9 INJECTION, SOLUTION INTRAVENOUS at 17:34

## 2019-01-18 RX ADMIN — FENTANYL CITRATE 25 MICROGRAM(S): 50 INJECTION INTRAVENOUS at 14:30

## 2019-01-18 RX ADMIN — Medication 100 MILLIGRAM(S): at 22:45

## 2019-01-18 RX ADMIN — FENTANYL CITRATE 25 MICROGRAM(S): 50 INJECTION INTRAVENOUS at 14:40

## 2019-01-18 NOTE — INPATIENT CERTIFICATION FOR MEDICARE PATIENTS - THE STATUS OF COMORBIDITIES.
----- Message from Michelle Brown MD sent at 6/21/2018  2:18 PM CDT -----  Cbc, tsh , hep negative.   Lipid with high ldl/ total cholesterol increase Crestor to 10 mg daily.   cmp with IFG and isolated elevated bili (all other liver enzymes normal)  recheck lipid and cmp in 3 months to check on lipid/ bili   2. The status of comorbities. (See ED/admit documents)

## 2019-01-18 NOTE — BRIEF OPERATIVE NOTE - POST-OP DX
Closed displaced comminuted fracture of shaft of left humerus, initial encounter  01/18/2019    Active  Shalom Wolfe

## 2019-01-18 NOTE — CHART NOTE - NSCHARTNOTEFT_GEN_A_CORE
discussed dvt ppx with dr meza. Originally restricted due to brain mass. As per Dr meza and risk assessment for DVT due to recent orthopedic surgery will start heparin SQ

## 2019-01-18 NOTE — BRIEF OPERATIVE NOTE - PROCEDURE
<<-----Click on this checkbox to enter Procedure Humerus fracture surgery with implant  01/18/2019  IMN left pathologic humerus fracture  Active  MLINN

## 2019-01-18 NOTE — PROGRESS NOTE ADULT - SUBJECTIVE AND OBJECTIVE BOX
Patient: TONY FORTE [id number] 83y Female                           Internal Medicine Hospitalist Progress Note    Initial HPI:  83y old female with PMHx of HTN, aortic stenosis, chronic thrombocytopenia, CAD stent x 2, CHF, liver cirrhosis, recently diagnosed metastatic cancer likely breast primary but have not undergone biopsy [place] [facility] [place] [facility] to [facility] ED with persistent LUE pain, limited ROM, general weakness/ malaise. In ED Pt noted to have left humerus fracture. Admitted for left humerus fracture and general weakness/ malaise 2/2 metastatic malignancy.    Interval History:  Awaiting OR for IMN vs ORIF.  Son at bedside reports pt more awake today.  She appears comfortable, despite reporting "10/10" pain at site of fracture.  No chest pain / palpitations. No SOB.  No additional complaints.    ____________________PHYSICAL EXAM:  Vitals reviewed as indicated below  GENERAL:  NAD alert, oriented to person.  Confused.   HEENT: NCAT  CARDIOVASCULAR:  S1, S2  LUNGS: CTAB  ABDOMEN:  soft, (-) tenderness, (-) distension, (+) bowel sounds, (-) guarding, (-) rebound (-) rigidity  EXTREMITIES:  no cyanosis / clubbing / edema.   NEURO: strength symmetric, sensation intact, limited by LUE brace.   ____________________     VITALS:  Vital Signs Last 24 Hrs  T(C): 36.6 (2019 07:39), Max: 37.2 (2019 23:38)  T(F): 97.9 (2019 07:39), Max: 99 (2019 23:38)  HR: 72 (2019 07:39) (69 - 94)  BP: 107/67 (2019 07:39) (102/50 - 116/68)  BP(mean): --  RR: 18 (2019 07:39) (17 - 18)  SpO2: 98% (2019 07:39) (88% - 100%) Daily     Daily Weight in k.5 (2019 16:23)  CAPILLARY BLOOD GLUCOSE        I&O's Summary    2019 07:01  -  2019 07:00  --------------------------------------------------------  IN: 700 mL / OUT: 200 mL / NET: 500 mL        LABS:                      MEDICATIONS:  aspirin enteric coated 81 milliGRAM(s) Oral daily  atorvastatin 40 milliGRAM(s) Oral at bedtime  docusate sodium 100 milliGRAM(s) Oral two times a day  letrozole 2.5 milliGRAM(s) Oral daily  metoprolol succinate ER 25 milliGRAM(s) Oral daily  oxyCODONE    5 mG/acetaminophen 325 mG 1 Tablet(s) Oral every 6 hours PRN  oxyCODONE    5 mG/acetaminophen 325 mG 2 Tablet(s) Oral every 6 hours PRN  polyethylene glycol 3350 17 Gram(s) Oral daily PRN  senna 2 Tablet(s) Oral at bedtime  sodium chloride 0.9%. 1000 milliLiter(s) IV Continuous <Continuous>  spironolactone 100 milliGRAM(s) Oral daily

## 2019-01-18 NOTE — CHART NOTE - NSCHARTNOTEFT_GEN_A_CORE
Plan of care d/w pt's son and daughter.  They agree with DVT prophylaxis with Heparin, acknowledging risks /benefits and atlernatives.

## 2019-01-18 NOTE — PROGRESS NOTE ADULT - ASSESSMENT
>Pathological Left Humeral fracture:  NWB LUE, splint/ sling in place.  She appears comfortable with current pain control.  Awaiting OR for IM Nailing vs orif.  Pt at elevated risk, but no medical contraindication to proposed surgical procedure.     >Metastatic malignancy likely breast being primary.  [place] and [place] [facility] [place]  Discussed with ortho to attempt bone biopsy during the procedure.  >Cancer related pain - Continue current pain control regimen.    >Essential HTN - Monitor BP.  Continue oral antihypertensives.  >Diabetes Type II on longterm Insulin Therapy - monitor fingersticks.  Insulin coverage for hyperglycemia.  >Hx CAD- On BBlocker, ASA, Statin.    >Hx CKD stage 3- Cr at baseline, monitor BMP  >DVT ppx- b/l LE ICDs.  Son reports her workup had revealed a brain lesion.  Will hold off on Lovenox for DVT prophylaxis for now.      Son and daughter to decide on MOLST for patient.

## 2019-01-18 NOTE — PROGRESS NOTE ADULT - SUBJECTIVE AND OBJECTIVE BOX
The patient is a 83y old female who presents with a complaint of humerus fracture and  weakness. She is scheduled to have an INTRAMEDULLARY NAIL, LEFT HUMEROUS FRACTURE.    (17 Jan 2019 13:01)      PAST MEDICAL HISTORY:  L.V.E.F. = 45 to 50% TTE OF 3/15/2018  Right Breast Cancer 4.0cm  x 3.8 cm with mets   Hypertension x 10 years  Thrombocytopenia  Renal insufficiency  Portal hypertension  Non-alcoholic fatty liver disease  Hyperparathyroidism  Aortic stenosis  Cirrhosis of liver      PAST SURGICAL HISTORY:  Cataract surgery  Cholecystectomy  Hysterectomy  Appendectomy  2 Cardiac Stents placed in April 2018    MEDICATIONS  (STANDING):  aspirin enteric coated 81 milliGRAM(s) Oral daily  atorvastatin 40 milliGRAM(s) Oral at bedtime  docusate sodium 100 milliGRAM(s) Oral two times a day  letrozole 2.5 milliGRAM(s) Oral daily  metoprolol succinate ER 25 milliGRAM(s) Oral daily  senna 2 Tablet(s) Oral at bedtime  sodium chloride 0.9%. 1000 milliLiter(s) (100 mL/Hr) IV Continuous <Continuous>  spironolactone 100 milliGRAM(s) Oral daily    MEDICATIONS  (PRN):  oxyCODONE    5 mG/acetaminophen 325 mG 1 Tablet(s) Oral every 6 hours PRN mild - moderate pain  oxyCODONE    5 mG/acetaminophen 325 mG 2 Tablet(s) Oral every 6 hours PRN Severe Pain (7 - 10)  polyethylene glycol 3350 17 Gram(s) Oral daily PRN Constipation      Allergies:    No Known Drug Allergies      SOCIAL HISTORY:  She doesn't drink alcohol or use illicit drugs.  She quit smoking 49 years                             ago.                      10.5   18.2  )-----------( 115      ( 16 Jan 2019 07:32 )             31.6     PT/INR - ( 14 Jan 2019 22:02 )   PT: 14.7 sec;   INR: 1.27 ratio       PTT - ( 04 Jan 2019 09:24 )  PTT:30.8 sec    01-16    133<L>  |  104  |  35.0<H>  ----------------------------<  110  4.6   |  19.0<L>  |  1.68<H>    Ca    9.3      16 Jan 2019 07:32    EKG  -  1/16/2019  Normal sinus rhythm  Low voltage QRS  Incomplete right bundle branch block  Cannot rule out Anterior infarct , age undetermined  T wave abnormality, consider inferior ischemia  Abnormal ECG    TT ECHO:  -  3/15/2019  Summary:   1. Severely enlarged left atrium.   2. Segmental wall motion abnormalities in distal LAD territory.   3. Left ventricular ejection fraction, by visual estimation, is 45 to        50%.   4. The right atrium is normal in size.   5. Normal right ventricular size and function.   6. Moderate tricuspid regurgitation.   7. Estimated pulmonary artery systolic pressure is 42.0 mmHg -h mild        pulmonary hypertension.   8. Trivial pericardial effusion.    CHEST X-RAY  -  1/14/2019  FINDINGS:    The lungs  are clear.  No pleural abnormality is seen.       The heart and mediastinum are within normal limits.       Visualized osseous structures are intact.    ASA # = 4  Mallampati # = unable to assess The patient is a 83y old female who presents with a complaint of humerus fracture and  weakness. She is scheduled to have an INTRAMEDULLARY NAIL, LEFT HUMEROUS FRACTURE.    (17 Jan 2019 13:01)      PAST MEDICAL HISTORY:  L.V.E.F. = 45 to 50% TTE OF 3/15/2018  Right Breast Cancer 4.0cm  x 3.8 cm with mets   Hypertension x 10 years  Thrombocytopenia  Renal insufficiency  Portal hypertension  Non-alcoholic fatty liver disease  Hyperparathyroidism  Aortic stenosis  Cirrhosis of liver      PAST SURGICAL HISTORY:  Cataract surgery  Cholecystectomy  Hysterectomy  Appendectomy  2 Cardiac Stents placed in April 2018    MEDICATIONS  (STANDING):  aspirin enteric coated 81 milliGRAM(s) Oral daily  atorvastatin 40 milliGRAM(s) Oral at bedtime  docusate sodium 100 milliGRAM(s) Oral two times a day  letrozole 2.5 milliGRAM(s) Oral daily  metoprolol succinate ER 25 milliGRAM(s) Oral daily  senna 2 Tablet(s) Oral at bedtime  sodium chloride 0.9%. 1000 milliLiter(s) (100 mL/Hr) IV Continuous <Continuous>  spironolactone 100 milliGRAM(s) Oral daily    MEDICATIONS  (PRN):  oxyCODONE    5 mG/acetaminophen 325 mG 1 Tablet(s) Oral every 6 hours PRN mild - moderate pain  oxyCODONE    5 mG/acetaminophen 325 mG 2 Tablet(s) Oral every 6 hours PRN Severe Pain (7 - 10)  polyethylene glycol 3350 17 Gram(s) Oral daily PRN Constipation      Allergies:    No Known Drug Allergies      SOCIAL HISTORY:  She doesn't drink alcohol or use illicit drugs.  She quit smoking 49 years                             ago.                      10.5   18.2  )-----------( 115      ( 16 Jan 2019 07:32 )             31.6     PT/INR - ( 14 Jan 2019 22:02 )   PT: 14.7 sec;   INR: 1.27 ratio       PTT - ( 04 Jan 2019 09:24 )  PTT:30.8 sec    01-16    133<L>  |  104  |  35.0<H>  ----------------------------<  110  4.6   |  19.0<L>  |  1.68<H>    Ca    9.3      16 Jan 2019 07:32    EKG  -  1/16/2019  Normal sinus rhythm  Low voltage QRS  Incomplete right bundle branch block  Cannot rule out Anterior infarct , age undetermined  T wave abnormality, consider inferior ischemia  Abnormal ECG    TT ECHO:  -  3/15/2019  Summary:   1. Severely enlarged left atrium.   2. Segmental wall motion abnormalities in distal LAD territory.   3. Left ventricular ejection fraction, by visual estimation, is 45 to        50%.   4. The right atrium is normal in size.   5. Normal right ventricular size and function.   6. Moderate tricuspid regurgitation.   7. Estimated pulmonary artery systolic pressure is 42.0 mmHg -h mild        pulmonary hypertension.   8. Trivial pericardial effusion.    CHEST X-RAY  -  1/14/2019  FINDINGS:    The lungs  are clear.  No pleural abnormality is seen.       The heart and mediastinum are within normal limits.       Visualized osseous structures are intact.    ASA # = 4  Mallampati # = unable to assess    Johnny Kessler (son)  [phone]  & (daughter) [phone]

## 2019-01-19 ENCOUNTER — OUTPATIENT (OUTPATIENT)
Dept: OUTPATIENT SERVICES | Facility: HOSPITAL | Age: 84
LOS: 1 days | Discharge: HOME | End: 2019-01-19

## 2019-01-19 DIAGNOSIS — Z90.710 ACQUIRED ABSENCE OF BOTH CERVIX AND UTERUS: Chronic | ICD-10-CM

## 2019-01-19 DIAGNOSIS — R91.8 OTHER NONSPECIFIC ABNORMAL FINDING OF LUNG FIELD: ICD-10-CM

## 2019-01-19 DIAGNOSIS — Z98.49 CATARACT EXTRACTION STATUS, UNSPECIFIED EYE: Chronic | ICD-10-CM

## 2019-01-19 DIAGNOSIS — Z98.890 OTHER SPECIFIED POSTPROCEDURAL STATES: Chronic | ICD-10-CM

## 2019-01-19 DIAGNOSIS — Z90.49 ACQUIRED ABSENCE OF OTHER SPECIFIED PARTS OF DIGESTIVE TRACT: Chronic | ICD-10-CM

## 2019-01-19 DIAGNOSIS — N63.0 UNSPECIFIED LUMP IN UNSPECIFIED BREAST: ICD-10-CM

## 2019-01-19 PROCEDURE — 99232 SBSQ HOSP IP/OBS MODERATE 35: CPT

## 2019-01-19 PROCEDURE — 99497 ADVNCD CARE PLAN 30 MIN: CPT

## 2019-01-19 RX ORDER — ACETAMINOPHEN 500 MG
650 TABLET ORAL EVERY 6 HOURS
Qty: 0 | Refills: 0 | Status: COMPLETED | OUTPATIENT
Start: 2019-01-19 | End: 2019-01-21

## 2019-01-19 RX ADMIN — ATORVASTATIN CALCIUM 40 MILLIGRAM(S): 80 TABLET, FILM COATED ORAL at 23:33

## 2019-01-19 RX ADMIN — LETROZOLE 2.5 MILLIGRAM(S): 2.5 TABLET, FILM COATED ORAL at 12:55

## 2019-01-19 RX ADMIN — Medication 100 MILLIGRAM(S): at 05:02

## 2019-01-19 RX ADMIN — HEPARIN SODIUM 5000 UNIT(S): 5000 INJECTION INTRAVENOUS; SUBCUTANEOUS at 17:03

## 2019-01-19 RX ADMIN — Medication 81 MILLIGRAM(S): at 12:55

## 2019-01-19 RX ADMIN — SODIUM CHLORIDE 100 MILLILITER(S): 9 INJECTION INTRAMUSCULAR; INTRAVENOUS; SUBCUTANEOUS at 05:02

## 2019-01-19 RX ADMIN — OXYCODONE AND ACETAMINOPHEN 1 TABLET(S): 5; 325 TABLET ORAL at 09:00

## 2019-01-19 RX ADMIN — OXYCODONE AND ACETAMINOPHEN 1 TABLET(S): 5; 325 TABLET ORAL at 08:11

## 2019-01-19 RX ADMIN — TAMSULOSIN HYDROCHLORIDE 0.4 MILLIGRAM(S): 0.4 CAPSULE ORAL at 23:33

## 2019-01-19 RX ADMIN — Medication 650 MILLIGRAM(S): at 23:33

## 2019-01-19 RX ADMIN — Medication 650 MILLIGRAM(S): at 16:59

## 2019-01-19 RX ADMIN — HEPARIN SODIUM 5000 UNIT(S): 5000 INJECTION INTRAVENOUS; SUBCUTANEOUS at 05:04

## 2019-01-19 RX ADMIN — Medication 650 MILLIGRAM(S): at 18:00

## 2019-01-19 NOTE — PROGRESS NOTE ADULT - SUBJECTIVE AND OBJECTIVE BOX
Pt Name: TONY FORTE    MRN: [id number]      Patient is a 83 year old Female status post Left humerus IMN on 1/18/19, POD #1. Patient seen and examined at bedside this afternoon. Patient lethargic and difficult to arouse, multiple family members at bedside. Patient did not respond to questions for exam.       PAST MEDICAL & SURGICAL HISTORY:  PAST MEDICAL & SURGICAL HISTORY:  Vitamin D deficiency  Thrombocytopenia  Renal insufficiency  Portal hypertension  Non-alcoholic fatty liver disease  Hyperparathyroidism  HLD (hyperlipidemia)  Elevated TSH  HTN (hypertension)  Aortic stenosis  Cirrhosis of liver  NAFLD (nonalcoholic fatty liver disease)  White coat syndrome without diagnosis of hypertension  S/P coronary angiogram: feb 2018  S/P cataract surgery  S/P cholecystectomy  S/P ANURAG-BSO  History of appendectomy    Allergies: No Known Allergies    Medications: aspirin enteric coated 81 milliGRAM(s) Oral daily  atorvastatin 40 milliGRAM(s) Oral at bedtime  docusate sodium 100 milliGRAM(s) Oral two times a day  heparin  Injectable 5000 Unit(s) SubCutaneous every 12 hours  letrozole 2.5 milliGRAM(s) Oral daily  metoprolol succinate ER 25 milliGRAM(s) Oral daily  oxyCODONE    5 mG/acetaminophen 325 mG 1 Tablet(s) Oral every 6 hours PRN  oxyCODONE    5 mG/acetaminophen 325 mG 2 Tablet(s) Oral every 6 hours PRN  polyethylene glycol 3350 17 Gram(s) Oral daily PRN  senna 2 Tablet(s) Oral at bedtime  spironolactone 100 milliGRAM(s) Oral daily  tamsulosin 0.4 milliGRAM(s) Oral at bedtime      PHYSICAL EXAM:    Vital Signs Last 24 Hrs  T(C): 36.6 (19 Jan 2019 07:30), Max: 37.1 (18 Jan 2019 15:30)  T(F): 97.8 (19 Jan 2019 07:30), Max: 98.8 (18 Jan 2019 15:30)  HR: 90 (19 Jan 2019 07:30) (66 - 103)  BP: 115/62 (19 Jan 2019 07:30) (90/55 - 138/49)  BP(mean): --  RR: 18 (19 Jan 2019 07:30) (12 - 24)  SpO2: 99% (19 Jan 2019 07:30) (93% - 99%)  Daily     Daily     Appearance: Alert, responsive, in no acute distress.    Left upper extremity: Proximal surgical dressing C/D/I. Distal dressing with moderate serosanguineous fluid- removed and replaced with xeroform, sterile gauze and tegaderm. Staples intact, non dehiscence, no periwound erythema, no blistering. Mild swelling noted. No able to determine sensation and motor due to patient's mental status.       A/P:  Pt is a  83y Female s/p Left humerus IMN POD #1    PLAN:   -Pain control  -DVTppx: as per primary team  -WBAT LUE  -Sling for comfort. At bedside.  -Dressing changes as needed  -Rest of care as per primary team

## 2019-01-19 NOTE — PROGRESS NOTE ADULT - ASSESSMENT
>Pathological Left Humeral fracture:  s/p IMN, seen by ortho.  OOB with PT.  Likely NINA placement  >Metabolic Encephalopathy - due to pain, opiates.  Discussed pain control with patient and family.    >Metastatic malignancy likely breast being primary.  Pt and family will f/u [facility].  Awaiting path sent by ortho.   >Cancer related pain - Continue current pain control regimen.    >Essential HTN - Monitor BP.  Continue oral antihypertensives.  >Diabetes Type II on longterm Insulin Therapy - monitor fingersticks.  Insulin coverage for hyperglycemia.  >Hx CAD- On BBlocker, ASA, Statin.    >Hx CKD stage 3- Cr at baseline, monitor BMP  >DVT ppx- b/l LE ICDs.  Son reports her workup had revealed a brain lesion.  Will hold off on Lovenox for DVT prophylaxis for now.      > Goals of care - I had a lengthy conversation with pt / primary caregiver.  We discussed the comorbid conditions, hospital care, and prognosis.  I discussed advanced directives with the patient / family - made aware of limited prognosis if patient were to be intubated or resuscitated, in consideration of age and comorbid conditions.  They agreed with DNR / DNI.  MOLST completed.  Total time spent on goals of care discussion = 25 minutes.

## 2019-01-19 NOTE — PHYSICAL THERAPY INITIAL EVALUATION ADULT - PLANNED THERAPY INTERVENTIONS, PT EVAL
balance training/bed mobility training/transfer training/gait training/wheelchair management/propulsion training

## 2019-01-19 NOTE — PHYSICAL THERAPY INITIAL EVALUATION ADULT - GENERAL OBSERVATIONS, REHAB EVAL
Patient was lying on bed with  2.0 lit O2, +Abbasi, +VCD, , +Monitoring, , + dressing on left Humerus,

## 2019-01-19 NOTE — PHYSICAL THERAPY INITIAL EVALUATION ADULT - PERTINENT HX OF CURRENT PROBLEM, REHAB EVAL
S/P IMN left closed displaced comminuted fracture of shaft of left Humerus, due to Metastatic CA, NSTE MI, Metastatic C

## 2019-01-19 NOTE — PROGRESS NOTE ADULT - SUBJECTIVE AND OBJECTIVE BOX
Patient: TONY FORTE [id number] 83y Female                           Internal Medicine Hospitalist Progress Note    Initial HPI:  83y old female with PMHx of HTN, aortic stenosis, chronic thrombocytopenia, CAD stent x 2, CHF, liver cirrhosis, recently diagnosed metastatic cancer likely breast primary but have not undergone biopsy (Was suppose to get biopsy at [facility] [place]), who presents to [facility] ED with persistent LUE pain, limited ROM, general weakness/ malaise. In ED Pt noted to have left humerus fracture. Admitted for left humerus fracture and general weakness/ malaise 2/2 metastatic malignancy.  Seen by ortho, s/p IMN on 1/18/19.  Samples sent to pathology.      Interval History:  Pt much more alert.  Denies complaints.  Pain controlled.  Took small amount of po.  No chest pain / palpitations.     ____________________PHYSICAL EXAM:  Vitals reviewed as indicated below  GENERAL:  NAD alert, oriented to person.  Confused.   HEENT: NCAT  CARDIOVASCULAR:  S1, S2  LUNGS: CTAB  ABDOMEN:  soft, (-) tenderness, (-) distension, (+) bowel sounds, (-) guarding, (-) rebound (-) rigidity  EXTREMITIES:  no cyanosis / clubbing / edema.   NEURO: strength symmetric, sensation intact.  ____________________     VITALS:  Vital Signs Last 24 Hrs  T(C): 36.6 (19 Jan 2019 07:30), Max: 37.1 (18 Jan 2019 15:30)  T(F): 97.8 (19 Jan 2019 07:30), Max: 98.8 (18 Jan 2019 15:30)  HR: 90 (19 Jan 2019 07:30) (66 - 103)  BP: 115/62 (19 Jan 2019 07:30) (90/55 - 138/49)  BP(mean): --  RR: 18 (19 Jan 2019 07:30) (12 - 24)  SpO2: 99% (19 Jan 2019 07:30) (93% - 99%) Daily     Daily   CAPILLARY BLOOD GLUCOSE        I&O's Summary    18 Jan 2019 07:01  -  19 Jan 2019 07:00  --------------------------------------------------------  IN: 1275 mL / OUT: 645 mL / NET: 630 mL        LABS:                      MEDICATIONS:  aspirin enteric coated 81 milliGRAM(s) Oral daily  atorvastatin 40 milliGRAM(s) Oral at bedtime  docusate sodium 100 milliGRAM(s) Oral two times a day  heparin  Injectable 5000 Unit(s) SubCutaneous every 12 hours  letrozole 2.5 milliGRAM(s) Oral daily  metoprolol succinate ER 25 milliGRAM(s) Oral daily  oxyCODONE    5 mG/acetaminophen 325 mG 1 Tablet(s) Oral every 6 hours PRN  oxyCODONE    5 mG/acetaminophen 325 mG 2 Tablet(s) Oral every 6 hours PRN  polyethylene glycol 3350 17 Gram(s) Oral daily PRN  senna 2 Tablet(s) Oral at bedtime  sodium chloride 0.9%. 1000 milliLiter(s) IV Continuous <Continuous>  spironolactone 100 milliGRAM(s) Oral daily  tamsulosin 0.4 milliGRAM(s) Oral at bedtime

## 2019-01-19 NOTE — PHYSICAL THERAPY INITIAL EVALUATION ADULT - DID THE PATIENT HAVE SURGERY?
yes/S/P IMN left closed displaced comminuted fracture of shaft of left Humerus, due to Metastatic CA, NSTE MI, Metastatic CA

## 2019-01-19 NOTE — PHYSICAL THERAPY INITIAL EVALUATION ADULT - IMPAIRMENTS FOUND, PT EVAL
gait, locomotion, and balance/arousal, attention, and cognition/cognitive impairment/muscle strength/aerobic capacity/endurance

## 2019-01-19 NOTE — PHYSICAL THERAPY INITIAL EVALUATION ADULT - CRITERIA FOR SKILLED THERAPEUTIC INTERVENTIONS
risk reduction/prevention/rehab potential/anticipated equipment needs at discharge/functional limitations in following categories/impairments found/therapy frequency/predicted duration of therapy intervention/anticipated discharge recommendation

## 2019-01-20 LAB
ANION GAP SERPL CALC-SCNC: 16 MMOL/L — SIGNIFICANT CHANGE UP (ref 5–17)
BUN SERPL-MCNC: 85 MG/DL — HIGH (ref 8–20)
CALCIUM SERPL-MCNC: 7.9 MG/DL — LOW (ref 8.6–10.2)
CHLORIDE SERPL-SCNC: 110 MMOL/L — HIGH (ref 98–107)
CO2 SERPL-SCNC: 16 MMOL/L — LOW (ref 22–29)
CREAT SERPL-MCNC: 2.47 MG/DL — HIGH (ref 0.5–1.3)
GLUCOSE SERPL-MCNC: 191 MG/DL — HIGH (ref 70–115)
HCT VFR BLD CALC: 31.6 % — LOW (ref 37–47)
HGB BLD-MCNC: 10.3 G/DL — LOW (ref 12–16)
MCHC RBC-ENTMCNC: 31.3 PG — HIGH (ref 27–31)
MCHC RBC-ENTMCNC: 32.6 G/DL — SIGNIFICANT CHANGE UP (ref 32–36)
MCV RBC AUTO: 96 FL — SIGNIFICANT CHANGE UP (ref 81–99)
PLATELET # BLD AUTO: 85 K/UL — LOW (ref 150–400)
POTASSIUM SERPL-MCNC: 4.6 MMOL/L — SIGNIFICANT CHANGE UP (ref 3.5–5.3)
POTASSIUM SERPL-SCNC: 4.6 MMOL/L — SIGNIFICANT CHANGE UP (ref 3.5–5.3)
RBC # BLD: 3.29 M/UL — LOW (ref 4.4–5.2)
RBC # FLD: 14.8 % — SIGNIFICANT CHANGE UP (ref 11–15.6)
SODIUM SERPL-SCNC: 142 MMOL/L — SIGNIFICANT CHANGE UP (ref 135–145)
WBC # BLD: 12.4 K/UL — HIGH (ref 4.8–10.8)
WBC # FLD AUTO: 12.4 K/UL — HIGH (ref 4.8–10.8)

## 2019-01-20 PROCEDURE — 99233 SBSQ HOSP IP/OBS HIGH 50: CPT

## 2019-01-20 PROCEDURE — 70450 CT HEAD/BRAIN W/O DYE: CPT | Mod: 26

## 2019-01-20 RX ORDER — ZINC OXIDE 200 MG/G
1 OINTMENT TOPICAL
Qty: 0 | Refills: 0 | Status: DISCONTINUED | OUTPATIENT
Start: 2019-01-20 | End: 2019-01-28

## 2019-01-20 RX ORDER — SODIUM CHLORIDE 9 MG/ML
1000 INJECTION, SOLUTION INTRAVENOUS
Qty: 0 | Refills: 0 | Status: DISCONTINUED | OUTPATIENT
Start: 2019-01-20 | End: 2019-01-22

## 2019-01-20 RX ADMIN — Medication 650 MILLIGRAM(S): at 00:30

## 2019-01-20 RX ADMIN — Medication 650 MILLIGRAM(S): at 06:00

## 2019-01-20 RX ADMIN — Medication 650 MILLIGRAM(S): at 05:30

## 2019-01-20 RX ADMIN — Medication 25 MILLIGRAM(S): at 05:30

## 2019-01-20 RX ADMIN — SPIRONOLACTONE 100 MILLIGRAM(S): 25 TABLET, FILM COATED ORAL at 05:30

## 2019-01-20 RX ADMIN — SODIUM CHLORIDE 75 MILLILITER(S): 9 INJECTION, SOLUTION INTRAVENOUS at 14:01

## 2019-01-20 RX ADMIN — HEPARIN SODIUM 5000 UNIT(S): 5000 INJECTION INTRAVENOUS; SUBCUTANEOUS at 18:11

## 2019-01-20 RX ADMIN — ZINC OXIDE 1 APPLICATION(S): 200 OINTMENT TOPICAL at 18:13

## 2019-01-20 NOTE — PROGRESS NOTE ADULT - SUBJECTIVE AND OBJECTIVE BOX
Patient: TONY FORTE [id number] 83y Female                           Internal Medicine Hospitalist Progress Note    Initial HPI:  83y old female with PMHx of HTN, aortic stenosis, chronic thrombocytopenia, CAD stent x 2, CHF, liver cirrhosis, recently diagnosed metastatic cancer likely breast primary but have not undergone biopsy (Was suppose to get biopsy at [facility] [place]), who presents to [facility] ED with persistent LUE pain, limited ROM, general weakness/ malaise. In ED Pt noted to have left humerus fracture. Admitted for left humerus fracture and general weakness/ malaise 2/2 metastatic malignancy.  Seen by ortho, s/p IMN on 1/18/19.  Samples sent to pathology.      Interval History:  She has had waxing and waning mental status since admission, initially attributed to opiate medication.  Percocet stopped, yet per RN and family, she continues to be awake at times, followed by periodic lethargy.  Per son and daughter at bedside, pain appears to be well controlled.  Seen with ortho PA at bedside.      ____________________PHYSICAL EXAM:  Vitals reviewed as indicated below  GENERAL:  NAD, lethargic, difficult to arouse, responsive to noxious stimuli.    HEENT: NCAT.  PERRL   CARDIOVASCULAR:  S1, S2  LUNGS: CTAB  ABDOMEN:  soft, (-) tenderness, (-) distension, (+) bowel sounds, (-) guarding, (-) rebound (-) rigidity  EXTREMITIES:  no cyanosis / clubbing / edema.   NEURO: Squeezes both hands symmetrically to command.   ____________________     VITALS:  Vital Signs Last 24 Hrs  T(C): 36.3 (20 Jan 2019 07:40), Max: 36.4 (19 Jan 2019 15:36)  T(F): 97.4 (20 Jan 2019 07:40), Max: 97.5 (19 Jan 2019 15:36)  HR: 72 (20 Jan 2019 07:40) (71 - 102)  BP: 102/63 (20 Jan 2019 07:40) (102/63 - 119/70)  BP(mean): --  RR: 18 (20 Jan 2019 07:40) (18 - 18)  SpO2: 96% (20 Jan 2019 07:40) (96% - 100%) Daily     Daily   CAPILLARY BLOOD GLUCOSE        I&O's Summary      LABS:                      MEDICATIONS:  acetaminophen   Tablet .. 650 milliGRAM(s) Oral every 6 hours  aspirin enteric coated 81 milliGRAM(s) Oral daily  atorvastatin 40 milliGRAM(s) Oral at bedtime  docusate sodium 100 milliGRAM(s) Oral two times a day  heparin  Injectable 5000 Unit(s) SubCutaneous every 12 hours  letrozole 2.5 milliGRAM(s) Oral daily  metoprolol succinate ER 25 milliGRAM(s) Oral daily  polyethylene glycol 3350 17 Gram(s) Oral daily PRN  senna 2 Tablet(s) Oral at bedtime  spironolactone 100 milliGRAM(s) Oral daily  tamsulosin 0.4 milliGRAM(s) Oral at bedtime  zinc oxide 40% Ointment 1 Application(s) Topical two times a day

## 2019-01-20 NOTE — PROGRESS NOTE ADULT - SUBJECTIVE AND OBJECTIVE BOX
Patient seen and examined at bedside with Dr. White. Patient currently unresponsive, does not awaken with sternal friction rub. slight movement with removal of LUE dressings.    Vital Signs Last 24 Hrs  T(C): 36.3 (20 Jan 2019 07:40), Max: 36.4 (19 Jan 2019 15:36)  T(F): 97.4 (20 Jan 2019 07:40), Max: 97.5 (19 Jan 2019 15:36)  HR: 72 (20 Jan 2019 07:40) (71 - 102)  BP: 102/63 (20 Jan 2019 07:40) (102/63 - 119/70)  BP(mean): --  RR: 18 (20 Jan 2019 07:40) (18 - 18)  SpO2: 96% (20 Jan 2019 07:40) (96% - 100%)    LUE: Dressings C/D/I. + diffuse ecchymoses noted. Removed, no active drainage. no erythema. radial pulse intact. Comparments soft throughout. Unable to assess motor/sensory exam seconary to patient's current mental state.    A/P: 83 y.o F s/p left humerus IMN POD #2  - dressings changed  - WBAT LUE  - DVTP as per primary team  - Dr. White aware of current mental state, will manage  - [facility] care primary team

## 2019-01-20 NOTE — PROVIDER CONTACT NOTE (MEDICATION) - RECOMMENDATIONS
PA made aware. NNO at this time. Recommended to delay administration of Heparin subQ until later in the day.

## 2019-01-20 NOTE — PROVIDER CONTACT NOTE (MEDICATION) - SITUATION
VSS, patient made 3 small soft bowel movements this shift. Last BM was brown with no blood noted. But a small amount of conner red blood came out of rectum. No clots noted.

## 2019-01-20 NOTE — PROGRESS NOTE ADULT - ASSESSMENT
>Pathological Left Humeral fracture:  s/p IMN, followed by ortho.  OOB with PT.  Likely NINA placement  >Metabolic Encephalopathy - Still with periodic lethargy despite cessation of opiates.  This has been consistent over past 4 days.  Known brain metastasis.  CT head requested.     >Metastatic malignancy likely breast being primary.  [place] [facility] [place]  Awaiting path sent by ortho.   >Cancer related pain - Appears controlled on Tylenol.  Will hold off on opiates.    >Essential HTN - Monitor BP.  Continue oral antihypertensives.  >Diabetes Type II on longterm Insulin Therapy - monitor fingersticks.  Insulin coverage for hyperglycemia.  >Hx CAD- On BBlocker, ASA, Statin.    >Hx CKD stage 3- Cr at baseline, monitor BMP  >DVT ppx- b/l LE ICDs.  restart Heparin if CT head unremarkable.

## 2019-01-21 LAB
ANION GAP SERPL CALC-SCNC: 14 MMOL/L — SIGNIFICANT CHANGE UP (ref 5–17)
BUN SERPL-MCNC: 83 MG/DL — HIGH (ref 8–20)
CALCIUM SERPL-MCNC: 7.8 MG/DL — LOW (ref 8.6–10.2)
CHLORIDE SERPL-SCNC: 108 MMOL/L — HIGH (ref 98–107)
CO2 SERPL-SCNC: 16 MMOL/L — LOW (ref 22–29)
CREAT SERPL-MCNC: 1.87 MG/DL — HIGH (ref 0.5–1.3)
GLUCOSE SERPL-MCNC: 136 MG/DL — HIGH (ref 70–115)
HCT VFR BLD CALC: 23.5 % — LOW (ref 37–47)
HGB BLD-MCNC: 8 G/DL — LOW (ref 12–16)
MCHC RBC-ENTMCNC: 33.5 PG — HIGH (ref 27–31)
MCHC RBC-ENTMCNC: 34 G/DL — SIGNIFICANT CHANGE UP (ref 32–36)
MCV RBC AUTO: 98.3 FL — SIGNIFICANT CHANGE UP (ref 81–99)
PLATELET # BLD AUTO: 60 K/UL — LOW (ref 150–400)
POTASSIUM SERPL-MCNC: 4.9 MMOL/L — SIGNIFICANT CHANGE UP (ref 3.5–5.3)
POTASSIUM SERPL-SCNC: 4.9 MMOL/L — SIGNIFICANT CHANGE UP (ref 3.5–5.3)
RBC # BLD: 2.39 M/UL — LOW (ref 4.4–5.2)
RBC # FLD: 15.1 % — SIGNIFICANT CHANGE UP (ref 11–15.6)
SODIUM SERPL-SCNC: 138 MMOL/L — SIGNIFICANT CHANGE UP (ref 135–145)
WBC # BLD: 9.2 K/UL — SIGNIFICANT CHANGE UP (ref 4.8–10.8)
WBC # FLD AUTO: 9.2 K/UL — SIGNIFICANT CHANGE UP (ref 4.8–10.8)

## 2019-01-21 PROCEDURE — 99232 SBSQ HOSP IP/OBS MODERATE 35: CPT

## 2019-01-21 RX ORDER — PANTOPRAZOLE SODIUM 20 MG/1
40 TABLET, DELAYED RELEASE ORAL
Qty: 0 | Refills: 0 | Status: DISCONTINUED | OUTPATIENT
Start: 2019-01-21 | End: 2019-01-22

## 2019-01-21 RX ADMIN — ZINC OXIDE 1 APPLICATION(S): 200 OINTMENT TOPICAL at 07:04

## 2019-01-21 RX ADMIN — SODIUM CHLORIDE 75 MILLILITER(S): 9 INJECTION, SOLUTION INTRAVENOUS at 07:03

## 2019-01-21 RX ADMIN — Medication 100 MILLIGRAM(S): at 16:42

## 2019-01-21 RX ADMIN — HEPARIN SODIUM 5000 UNIT(S): 5000 INJECTION INTRAVENOUS; SUBCUTANEOUS at 07:03

## 2019-01-21 RX ADMIN — SENNA PLUS 2 TABLET(S): 8.6 TABLET ORAL at 21:30

## 2019-01-21 RX ADMIN — ATORVASTATIN CALCIUM 40 MILLIGRAM(S): 80 TABLET, FILM COATED ORAL at 21:30

## 2019-01-21 RX ADMIN — TAMSULOSIN HYDROCHLORIDE 0.4 MILLIGRAM(S): 0.4 CAPSULE ORAL at 21:29

## 2019-01-21 RX ADMIN — HEPARIN SODIUM 5000 UNIT(S): 5000 INJECTION INTRAVENOUS; SUBCUTANEOUS at 16:43

## 2019-01-21 RX ADMIN — LETROZOLE 2.5 MILLIGRAM(S): 2.5 TABLET, FILM COATED ORAL at 16:42

## 2019-01-21 RX ADMIN — ZINC OXIDE 1 APPLICATION(S): 200 OINTMENT TOPICAL at 16:39

## 2019-01-21 NOTE — PROGRESS NOTE ADULT - SUBJECTIVE AND OBJECTIVE BOX
Patient: TONY FORTE [id number] 83y Female                           Internal Medicine Hospitalist Progress Note    Initial HPI:  83y old female with PMHx of HTN, aortic stenosis, chronic thrombocytopenia, CAD stent x 2, CHF, liver cirrhosis, recently diagnosed metastatic cancer likely breast primary but have not undergone biopsy (Was suppose to get [place] [facility] [place] who presents to [facility] ED with persistent LUE pain, limited ROM, general weakness/ malaise. In ED Pt noted to have left humerus fracture. Admitted for left humerus fracture and general weakness/ malaise 2/2 metastatic malignancy.  Seen by ortho, s/p IMN on 1/18/19.  Samples sent to pathology.      Interval History: patient is not answering question or opening eyes,. as per RN she spoke to night RN. as per daughter and son at bedside patient drank shakes yesterday. but not communicating today and continues to have waxing and waning MS. off narcotic    ROS: limited.    Vital Signs Last 24 Hrs  T(C): 36.2 (21 Jan 2019 09:30), Max: 36.4 (20 Jan 2019 23:45)  T(F): 97.2 (21 Jan 2019 09:30), Max: 97.6 (20 Jan 2019 23:45)  HR: 70 (21 Jan 2019 09:30) (61 - 70)  BP: 126/69 (21 Jan 2019 09:30) (107/70 - 126/69)  BP(mean): --  RR: 16 (21 Jan 2019 09:30) (16 - 18)  SpO2: --    CAPILLARY BLOOD GLUCOSE      I&O's Detail    20 Jan 2019 07:01  -  21 Jan 2019 07:00  --------------------------------------------------------  IN:    sodium chloride 0.45%.: 825 mL  Total IN: 825 mL    OUT:    Indwelling Catheter - Urethral: 400 mL  Total OUT: 400 mL    Total NET: 425 mL      21 Jan 2019 07:01  -  21 Jan 2019 15:38  --------------------------------------------------------  IN:  Total IN: 0 mL    OUT:    Indwelling Catheter - Urethral: 400 mL  Total OUT: 400 mL    Total NET: -400 mL    GENERAL: Not in distress. lethargic. not answering qs. respond to noxious stimuli  HEENT:  Normocephalic and atraumatic. PEARLA,  NECK: Supple.  No JVD.    CARDIOVASCULAR: RRR S1, S2. No murmur/rubs/gallop  LUNGS: BLAE+, no rales, no wheezing, no rhonchi.    ABDOMEN: ND. Soft,  NT, no guarding / rebound / rigidity. BS normoactive. No CVA tenderness.    BACK: No spine tenderness.  EXTREMITIES: no cyanosis, no clubbing, no edema.   SKIN: no rash. No skin break or ulcer. No cellulitis.   NEUROLOGIC: lethargic  PSYCHIATRIC: Calm.  No agitation.    LABS:                          8.0    9.2   )-----------( 60       ( 21 Jan 2019 06:51 )             23.5       01-21    138  |  108<H>  |  83.0<H>  ----------------------------<  136<H>  4.9   |  16.0<L>  |  1.87<H>    Ca    7.8<L>      21 Jan 2019 09:35    MEDICATIONS  (STANDING):  aspirin enteric coated 81 milliGRAM(s) Oral daily  atorvastatin 40 milliGRAM(s) Oral at bedtime  docusate sodium 100 milliGRAM(s) Oral two times a day  heparin  Injectable 5000 Unit(s) SubCutaneous every 12 hours  letrozole 2.5 milliGRAM(s) Oral daily  metoprolol succinate ER 25 milliGRAM(s) Oral daily  senna 2 Tablet(s) Oral at bedtime  sodium chloride 0.45%. 1000 milliLiter(s) (75 mL/Hr) IV Continuous <Continuous>  spironolactone 100 milliGRAM(s) Oral daily  tamsulosin 0.4 milliGRAM(s) Oral at bedtime  zinc oxide 40% Ointment 1 Application(s) Topical two times a day    MEDICATIONS  (PRN):  polyethylene glycol 3350 17 Gram(s) Oral daily PRN Constipation

## 2019-01-21 NOTE — PROGRESS NOTE ADULT - SUBJECTIVE AND OBJECTIVE BOX
TONY FORTE    [id number]    History: 83y Female is status post L humerus IM nail POD #3. Patient has been lethargic post-operatively but arousable. The patients vital signs are stable. The patient is in no apparent distress.                         8.0    9.2   )-----------( 60       ( 21 Jan 2019 06:51 )             23.5     01-21    138  |  108<H>  |  83.0<H>  ----------------------------<  136<H>  4.9   |  16.0<L>  |  1.87<H>    Ca    7.8<L>      21 Jan 2019 09:35        MEDICATIONS  (STANDING):  acetaminophen   Tablet .. 650 milliGRAM(s) Oral every 6 hours  aspirin enteric coated 81 milliGRAM(s) Oral daily  atorvastatin 40 milliGRAM(s) Oral at bedtime  docusate sodium 100 milliGRAM(s) Oral two times a day  heparin  Injectable 5000 Unit(s) SubCutaneous every 12 hours  letrozole 2.5 milliGRAM(s) Oral daily  metoprolol succinate ER 25 milliGRAM(s) Oral daily  senna 2 Tablet(s) Oral at bedtime  sodium chloride 0.45%. 1000 milliLiter(s) (75 mL/Hr) IV Continuous <Continuous>  spironolactone 100 milliGRAM(s) Oral daily  tamsulosin 0.4 milliGRAM(s) Oral at bedtime  zinc oxide 40% Ointment 1 Application(s) Topical two times a day    MEDICATIONS  (PRN):  polyethylene glycol 3350 17 Gram(s) Oral daily PRN Constipation      Physical exam: Physical exam limited secondary to lethargy. Patient not participating in physical exam secondary to lethargy The dressing is clean, dry and intact. No drainage or discharge. Ecchymosis and LUE edema present. Sensation to light touch is grossly intact distally.  2+ radial pulse. Capillary refill is less than 2 seconds. No cyanosis.    Primary Orthopedic Assessment:  •  83y Female is status post L humerus IM nail POD #3    Secondary  Orthopedic Assessment(s):   •     Secondary  Medical Assessment(s):   •     Plan:   • Pain control as clinically indicated. Will hold narcotics secondary to lethargy   • DVT prophylaxis as prescribed, including use of compression devices and ankle pumps  • Continue physical therapy  • Weightbearing as tolerated of the left upper extremity   • Incentive spirometry encouraged  • Discharge planning as per primary team

## 2019-01-21 NOTE — PROGRESS NOTE ADULT - ASSESSMENT
>Pathological Left Humeral fracture:  s/p IMN POD#3, followed by ortho.  OOB with PT.  Likely NINA placement. PT  >Metabolic Encephalopathy - Still with periodic lethargy despite cessation of opiates.  This has been consistent over past 4 days.  Known brain metastasis.  CT head neg. consider neuro eval/MRI brain . place on . aspiration/fall precautions  >Metastatic malignancy likely breast being primary.  Pt and family will f/u with Dr. Presley at [facility] [place].  Awaiting path sent by ortho.   >Cancer related pain - Appears controlled on Tylenol.  Will hold off on opiates.    >Essential HTN - Monitor BP.  Continue oral antihypertensives.  >Diabetes Type II on longterm Insulin Therapy - monitor fingersticks.  Insulin coverage for hyperglycemia.  >Hx CAD- On BBlocker, ASA, Statin.    >Hx CKD stage 3- Cr at baseline, monitor BMP  >DVT ppx- b/l LE ICDs.ankle pump . heparin >Pathological Left Humeral fracture:  s/p IMN POD#3, followed by ortho.  OOB with PT.  Likely NINA placement. PT  >Metabolic Encephalopathy - Still with periodic lethargy despite cessation of opiates.  This has been consistent over past 4 days.  Known brain metastasis.  CT head neg. consider neuro eval/MRI brain . place on . aspiration/fall precautions  >Metastatic malignancy likely breast being primary.  Pt and family will f/u with Dr. Presley at [facility] [place] [place].  Awaiting path sent by ortho.   >Cancer related pain - Appears controlled on Tylenol.  Will hold off on opiates.    >Essential HTN - Monitor BP.  Continue oral antihypertensives.  >Diabetes Type II on longterm Insulin Therapy - monitor fingersticks.  Insulin coverage for hyperglycemia.  >Hx CAD- On BBlocker, ASA, Statin.    >Hx CKD stage 3- Cr at baseline, monitor BMP  > anemia: hb dropped from 10 to 8. no ssx of bleeding. monitor. FOBT. PPI  >DVT ppx- b/l LE ICDs.ankle pump . heparin

## 2019-01-22 LAB
ANION GAP SERPL CALC-SCNC: 14 MMOL/L — SIGNIFICANT CHANGE UP (ref 5–17)
BUN SERPL-MCNC: 82 MG/DL — HIGH (ref 8–20)
CALCIUM SERPL-MCNC: 7.7 MG/DL — LOW (ref 8.6–10.2)
CHLORIDE SERPL-SCNC: 107 MMOL/L — SIGNIFICANT CHANGE UP (ref 98–107)
CO2 SERPL-SCNC: 17 MMOL/L — LOW (ref 22–29)
CREAT SERPL-MCNC: 1.74 MG/DL — HIGH (ref 0.5–1.3)
FERRITIN SERPL-MCNC: 668 NG/ML — HIGH (ref 15–150)
GLUCOSE SERPL-MCNC: 142 MG/DL — HIGH (ref 70–115)
HCT VFR BLD CALC: 34.4 % — LOW (ref 37–47)
HGB BLD-MCNC: 11.4 G/DL — LOW (ref 12–16)
IRON SATN MFR SERPL: 14 % — SIGNIFICANT CHANGE UP (ref 14–50)
IRON SATN MFR SERPL: 32 UG/DL — LOW (ref 37–145)
MAGNESIUM SERPL-MCNC: 2.2 MG/DL — SIGNIFICANT CHANGE UP (ref 1.6–2.6)
MCHC RBC-ENTMCNC: 31.8 PG — HIGH (ref 27–31)
MCHC RBC-ENTMCNC: 33.1 G/DL — SIGNIFICANT CHANGE UP (ref 32–36)
MCV RBC AUTO: 95.8 FL — SIGNIFICANT CHANGE UP (ref 81–99)
PHOSPHATE SERPL-MCNC: 2.3 MG/DL — LOW (ref 2.4–4.7)
PLATELET # BLD AUTO: 89 K/UL — LOW (ref 150–400)
POTASSIUM SERPL-MCNC: 5.1 MMOL/L — SIGNIFICANT CHANGE UP (ref 3.5–5.3)
POTASSIUM SERPL-SCNC: 5.1 MMOL/L — SIGNIFICANT CHANGE UP (ref 3.5–5.3)
RBC # BLD: 3.59 M/UL — LOW (ref 4.4–5.2)
RBC # FLD: 14.9 % — SIGNIFICANT CHANGE UP (ref 11–15.6)
SODIUM SERPL-SCNC: 138 MMOL/L — SIGNIFICANT CHANGE UP (ref 135–145)
TIBC SERPL-MCNC: 225 UG/DL — SIGNIFICANT CHANGE UP (ref 220–430)
TRANSFERRIN SERPL-MCNC: 157 MG/DL — LOW (ref 192–382)
WBC # BLD: 16.9 K/UL — HIGH (ref 4.8–10.8)
WBC # FLD AUTO: 16.9 K/UL — HIGH (ref 4.8–10.8)

## 2019-01-22 PROCEDURE — 99232 SBSQ HOSP IP/OBS MODERATE 35: CPT

## 2019-01-22 RX ORDER — SODIUM BICARBONATE 1 MEQ/ML
0.24 SYRINGE (ML) INTRAVENOUS
Qty: 150 | Refills: 0 | Status: DISCONTINUED | OUTPATIENT
Start: 2019-01-22 | End: 2019-01-23

## 2019-01-22 RX ORDER — ACETAMINOPHEN 500 MG
1000 TABLET ORAL ONCE
Qty: 0 | Refills: 0 | Status: COMPLETED | OUTPATIENT
Start: 2019-01-22 | End: 2019-01-22

## 2019-01-22 RX ORDER — PANTOPRAZOLE SODIUM 20 MG/1
40 TABLET, DELAYED RELEASE ORAL
Qty: 0 | Refills: 0 | Status: DISCONTINUED | OUTPATIENT
Start: 2019-01-22 | End: 2019-01-24

## 2019-01-22 RX ADMIN — TAMSULOSIN HYDROCHLORIDE 0.4 MILLIGRAM(S): 0.4 CAPSULE ORAL at 22:10

## 2019-01-22 RX ADMIN — LETROZOLE 2.5 MILLIGRAM(S): 2.5 TABLET, FILM COATED ORAL at 13:04

## 2019-01-22 RX ADMIN — HEPARIN SODIUM 5000 UNIT(S): 5000 INJECTION INTRAVENOUS; SUBCUTANEOUS at 05:48

## 2019-01-22 RX ADMIN — ZINC OXIDE 1 APPLICATION(S): 200 OINTMENT TOPICAL at 05:45

## 2019-01-22 RX ADMIN — ZINC OXIDE 1 APPLICATION(S): 200 OINTMENT TOPICAL at 17:51

## 2019-01-22 RX ADMIN — Medication 1000 MILLIGRAM(S): at 18:07

## 2019-01-22 RX ADMIN — Medication 400 MILLIGRAM(S): at 03:45

## 2019-01-22 RX ADMIN — Medication 81 MILLIGRAM(S): at 13:03

## 2019-01-22 RX ADMIN — SODIUM CHLORIDE 75 MILLILITER(S): 9 INJECTION, SOLUTION INTRAVENOUS at 03:47

## 2019-01-22 RX ADMIN — Medication 400 MILLIGRAM(S): at 17:49

## 2019-01-22 RX ADMIN — Medication 100 MILLIGRAM(S): at 05:48

## 2019-01-22 RX ADMIN — PANTOPRAZOLE SODIUM 40 MILLIGRAM(S): 20 TABLET, DELAYED RELEASE ORAL at 05:48

## 2019-01-22 RX ADMIN — PANTOPRAZOLE SODIUM 40 MILLIGRAM(S): 20 TABLET, DELAYED RELEASE ORAL at 18:03

## 2019-01-22 RX ADMIN — Medication 10 MILLIGRAM(S): at 18:08

## 2019-01-22 RX ADMIN — SENNA PLUS 2 TABLET(S): 8.6 TABLET ORAL at 22:10

## 2019-01-22 RX ADMIN — HEPARIN SODIUM 5000 UNIT(S): 5000 INJECTION INTRAVENOUS; SUBCUTANEOUS at 18:02

## 2019-01-22 RX ADMIN — Medication 1000 MILLIGRAM(S): at 04:00

## 2019-01-22 RX ADMIN — Medication 100 MILLIGRAM(S): at 17:50

## 2019-01-22 RX ADMIN — Medication 100 MEQ/KG/HR: at 09:56

## 2019-01-22 NOTE — PROGRESS NOTE ADULT - SUBJECTIVE AND OBJECTIVE BOX
Patient: TONY FORTE [id number] 83y Female                           Internal Medicine Hospitalist Progress Note    Initial HPI:  83y old female with PMHx of HTN, aortic stenosis, chronic thrombocytopenia, CAD stent x 2, CHF, liver cirrhosis, recently diagnosed metastatic cancer likely breast primary but have not undergone biopsy (Was suppose [place] get [place] [facility] [place] who presents to [facility] ED with persistent LUE pain, limited ROM, general weakness/ malaise. In ED Pt noted to have left humerus fracture. Admitted for left humerus fracture and general weakness/ malaise 2/2 metastatic malignancy.  Seen by ortho, s/p IMN on 1/18/19.  Samples sent to pathology.      Interval History: patient is more alert today and answering question. wanted to go home. reports epigastric discomfort and bloating. moved bowel this am, normal stool. no nausea/vomiting/constipation. no fever/chills/dysuria/flank pain. reports some back pain, relates it to laying down longtime. admits left hip pain and weakness of hip. no other limb weakness. son at bedside. reported patient looks better today.     ROS: as per HPI    Vital Signs Last 24 Hrs  T(C): 34.7 (22 Jan 2019 08:41), Max: 36.1 (21 Jan 2019 23:36)  T(F): 94.4 (22 Jan 2019 08:41), Max: 97 (21 Jan 2019 23:36)  HR: 62 (22 Jan 2019 08:41) (60 - 72)  BP: 118/72 (22 Jan 2019 08:41) (109/71 - 132/71)  BP(mean): 91 (21 Jan 2019 20:17) (91 - 91)  RR: 20 (22 Jan 2019 08:41) (18 - 20)  SpO2: 94% (22 Jan 2019 08:41) (94% - 99%)  CAPILLARY BLOOD GLUCOSE    CAPILLARY BLOOD GLUCOSE      I&O's Detail    21 Jan 2019 07:01  -  22 Jan 2019 07:00  --------------------------------------------------------  IN:  Total IN: 0 mL    OUT:    Indwelling Catheter - Urethral: 600 mL  Total OUT: 600 mL    Total NET: -600 mL      22 Jan 2019 07:01  -  22 Jan 2019 11:57  --------------------------------------------------------  IN:    sodium bicarbonate  Infusion: 200 mL  Total IN: 200 mL    OUT:  Total OUT: 0 mL    Total NET: 200 mL      GENERAL: Not in distress. alert.   HEENT:  Normocephalic and atraumatic. PEARLA, EOMI  NECK: Supple.  No JVD.    CARDIOVASCULAR: RRR S1, S2. No murmur/rubs/gallop  LUNGS: BLAE+, no rales, no wheezing, no rhonchi.    ABDOMEN: ND. Soft,  mild epigastric tenderness+, no guarding / rebound / rigidity. BS normoactive. No CVA tenderness.    BACK: No spine tenderness.  EXTREMITIES: no cyanosis, no clubbing, no edema.   SKIN: no rash. No skin break or ulcer. No cellulitis.   NEUROLOGIC: AAO*3. grossly intact except some giveaway weakness LLE  PSYCHIATRIC: Calm.  No agitation.    LABS:                                     11.4   16.9  )-----------( 89       ( 22 Jan 2019 08:26 )             34.4       01-22    138  |  107  |  82.0<H>  ----------------------------<  142<H>  5.1   |  17.0<L>  |  1.74<H>    Ca    7.7<L>      22 Jan 2019 08:26  Phos  2.3     01-22  Mg     2.2     01-22    MEDICATIONS  (STANDING):  aspirin enteric coated 81 milliGRAM(s) Oral daily  atorvastatin 40 milliGRAM(s) Oral at bedtime  docusate sodium 100 milliGRAM(s) Oral two times a day  heparin  Injectable 5000 Unit(s) SubCutaneous every 12 hours  letrozole 2.5 milliGRAM(s) Oral daily  metoprolol succinate ER 25 milliGRAM(s) Oral daily  pantoprazole  Injectable 40 milliGRAM(s) IV Push two times a day  senna 2 Tablet(s) Oral at bedtime  sodium bicarbonate  Infusion 0.238 mEq/kG/Hr (100 mL/Hr) IV Continuous <Continuous>  spironolactone 100 milliGRAM(s) Oral daily  tamsulosin 0.4 milliGRAM(s) Oral at bedtime  zinc oxide 40% Ointment 1 Application(s) Topical two times a day    MEDICATIONS  (PRN):  aluminum hydroxide/magnesium hydroxide/simethicone Suspension 30 milliLiter(s) Oral every 4 hours PRN Dyspepsia  polyethylene glycol 3350 17 Gram(s) Oral daily PRN Constipation

## 2019-01-22 NOTE — PROGRESS NOTE ADULT - SUBJECTIVE AND OBJECTIVE BOX
Pt Name: TONY FORTE    MRN: [id number]    Patient is a being followed for left humerus IMN POD#4. Pt was obtunded and narcotics d/c as of yesterday as per PT note, and other provider notes. Son at bedside, says mother is much more awake today since they d/c narcotics. Pt states she is tired and feels lousy in general, but her pain is under control and has no acute complaints at this time to report. Denies numbness or tingling, or changes in motor distally.     PAST MEDICAL & SURGICAL HISTORY:  PAST MEDICAL & SURGICAL HISTORY:  Vitamin D deficiency  Thrombocytopenia  Renal insufficiency  Portal hypertension  Non-alcoholic fatty liver disease  Hyperparathyroidism  HLD (hyperlipidemia)  Elevated TSH  HTN (hypertension)  Aortic stenosis  Cirrhosis of liver  NAFLD (nonalcoholic fatty liver disease)  White coat syndrome without diagnosis of hypertension  S/P coronary angiogram: feb 2018  S/P cataract surgery  S/P cholecystectomy  S/P ANURAG-BSO  History of appendectomy      Allergies: No Known Allergies      Medications: aspirin enteric coated 81 milliGRAM(s) Oral daily  atorvastatin 40 milliGRAM(s) Oral at bedtime  docusate sodium 100 milliGRAM(s) Oral two times a day  heparin  Injectable 5000 Unit(s) SubCutaneous every 12 hours  letrozole 2.5 milliGRAM(s) Oral daily  metoprolol succinate ER 25 milliGRAM(s) Oral daily  pantoprazole    Tablet 40 milliGRAM(s) Oral before breakfast  polyethylene glycol 3350 17 Gram(s) Oral daily PRN  senna 2 Tablet(s) Oral at bedtime  sodium bicarbonate  Infusion 0.238 mEq/kG/Hr IV Continuous <Continuous>  spironolactone 100 milliGRAM(s) Oral daily  tamsulosin 0.4 milliGRAM(s) Oral at bedtime  zinc oxide 40% Ointment 1 Application(s) Topical two times a day               11.4   16.9  )-----------( 89       ( 22 Jan 2019 08:26 )             34.4     01-22    138  |  107  |  82.0<H>  ----------------------------<  142<H>  5.1   |  17.0<L>  |  1.74<H>    Ca    7.7<L>      22 Jan 2019 08:26  Phos  2.3     01-22  Mg     2.2     01-22        PHYSICAL EXAM:    Vital Signs Last 24 Hrs  T(C): 34.7 (22 Jan 2019 08:41), Max: 36.1 (21 Jan 2019 23:36)  T(F): 94.4 (22 Jan 2019 08:41), Max: 97 (21 Jan 2019 23:36)  HR: 62 (22 Jan 2019 08:41) (60 - 72)  BP: 118/72 (22 Jan 2019 08:41) (109/71 - 132/71)  BP(mean): 91 (21 Jan 2019 20:17) (91 - 91)  RR: 20 (22 Jan 2019 08:41) (18 - 20)  SpO2: 94% (22 Jan 2019 08:41) (94% - 99%)  Daily     Daily     Appearance: Alert, responsive, in no acute distress.    Neurological: Sensation is grossly intact to light touch. No focal deficits or weaknesses found.    Skin: see MSK    Vascular: 2+ distal pulses. Cap refill < 2 sec. No cyanosis.    Musculoskeletal:         Left Upper Extremity: proximal shoulder dressing c/d/i. Distal dressing mild serous staining, intact. Diffuse ecchymoses of varying stages throughout the LUE. Moderate effusion of the LUE, effusion of the hand noted - pt reports improving. EPL/FPL+ digits+ wrist ext/flex+ elbow flex/ext+, shoulder ROM minimal due to pain at this time. SILT. RP 2+. BCR, no cyanosis, compartments soft and compressible.    A/P:  Pt is a  83y Female with left humerus IMN POD#4    PLAN:   * PT/OT, WBAT LUE  * Pain Control  * DVT ppx  * Cont. care per medical team

## 2019-01-22 NOTE — OCCUPATIONAL THERAPY INITIAL EVALUATION ADULT - DIAGNOSIS, OT EVAL
Functional decline S/P IMN left closed displaced comminuted fracture of shaft of left Humerus, due to Metastatic CA, NSTE MI, Metastatic C

## 2019-01-22 NOTE — PROGRESS NOTE ADULT - ASSESSMENT
>Pathological Left Humeral fracture:  s/p IMN POD#4, followed by ortho.  OOB with PT.  Likely NINA placement. PT eval called  >Metabolic Encephalopathy - Still with periodic lethargy despite cessation of opiates.  better today. Known brain metastasis.  CT head neg. consider neuro eval/MRI brain if persistently lethargic . place on . aspiration/fall precautions  >Metastatic malignancy likely breast being primary.  Pt and family will f/u with Dr. Presley at [facility] [place].  Awaiting path sent by ortho.   >Cancer related pain - Appears controlled on Tylenol.  Will hold off on opiates.    >Essential HTN - Monitor BP.  Continue oral antihypertensives.  >Diabetes Type II on longterm Insulin Therapy - monitor fingersticks.  Insulin coverage for hyperglycemia.  >Hx CAD- On BBlocker, ASA, Statin.    >Hx CKD stage 3- Cr at baseline, monitor BMP. CO2 low. started on Bicarb drip for 16 hrs.   > anemia: no ssx of bleeding. monitor. FOBT. PPI. iron panel reviewed. high ferritin. possible ACD  > abd pain and epigastric tenderness. PPI changed to IV BID. added maalox. monitor CBC.   > back pain: PTN ambulate. send UA,UC to r/o UTI as WBC elevated today. monitor temp/fever  >DVT ppx- b/l LE ICDs.ankle pump . heparin  > GOC: palliative on board. DNR/DNI

## 2019-01-23 DIAGNOSIS — I21.4 NON-ST ELEVATION (NSTEMI) MYOCARDIAL INFARCTION: ICD-10-CM

## 2019-01-23 DIAGNOSIS — R53.81 OTHER MALAISE: ICD-10-CM

## 2019-01-23 DIAGNOSIS — C79.9 SECONDARY MALIGNANT NEOPLASM OF UNSPECIFIED SITE: ICD-10-CM

## 2019-01-23 DIAGNOSIS — N18.9 CHRONIC KIDNEY DISEASE, UNSPECIFIED: ICD-10-CM

## 2019-01-23 DIAGNOSIS — K59.01 SLOW TRANSIT CONSTIPATION: ICD-10-CM

## 2019-01-23 DIAGNOSIS — R18.8 OTHER ASCITES: ICD-10-CM

## 2019-01-23 DIAGNOSIS — K59.00 CONSTIPATION, UNSPECIFIED: ICD-10-CM

## 2019-01-23 DIAGNOSIS — D72.829 ELEVATED WHITE BLOOD CELL COUNT, UNSPECIFIED: ICD-10-CM

## 2019-01-23 DIAGNOSIS — C50.919 MALIGNANT NEOPLASM OF UNSPECIFIED SITE OF UNSPECIFIED FEMALE BREAST: ICD-10-CM

## 2019-01-23 DIAGNOSIS — K56.41 FECAL IMPACTION: ICD-10-CM

## 2019-01-23 DIAGNOSIS — Z51.5 ENCOUNTER FOR PALLIATIVE CARE: ICD-10-CM

## 2019-01-23 DIAGNOSIS — K74.69 OTHER CIRRHOSIS OF LIVER: ICD-10-CM

## 2019-01-23 DIAGNOSIS — S42.309A UNSPECIFIED FRACTURE OF SHAFT OF HUMERUS, UNSPECIFIED ARM, INITIAL ENCOUNTER FOR CLOSED FRACTURE: ICD-10-CM

## 2019-01-23 LAB
ALBUMIN SERPL ELPH-MCNC: 2.3 G/DL — LOW (ref 3.3–5.2)
ALP SERPL-CCNC: 280 U/L — HIGH (ref 40–120)
ALT FLD-CCNC: 10 U/L — SIGNIFICANT CHANGE UP
ANION GAP SERPL CALC-SCNC: 15 MMOL/L — SIGNIFICANT CHANGE UP (ref 5–17)
APPEARANCE UR: CLEAR — SIGNIFICANT CHANGE UP
AST SERPL-CCNC: 112 U/L — HIGH
BACTERIA # UR AUTO: ABNORMAL
BILIRUB SERPL-MCNC: 2.4 MG/DL — HIGH (ref 0.4–2)
BILIRUB UR-MCNC: ABNORMAL
BUN SERPL-MCNC: 80 MG/DL — HIGH (ref 8–20)
CALCIUM SERPL-MCNC: 7.8 MG/DL — LOW (ref 8.6–10.2)
CHLORIDE SERPL-SCNC: 102 MMOL/L — SIGNIFICANT CHANGE UP (ref 98–107)
CO2 SERPL-SCNC: 19 MMOL/L — LOW (ref 22–29)
COLOR SPEC: YELLOW — SIGNIFICANT CHANGE UP
CREAT SERPL-MCNC: 1.73 MG/DL — HIGH (ref 0.5–1.3)
DIFF PNL FLD: ABNORMAL
EPI CELLS # UR: SIGNIFICANT CHANGE UP
GLUCOSE SERPL-MCNC: 175 MG/DL — HIGH (ref 70–115)
GLUCOSE UR QL: NEGATIVE MG/DL — SIGNIFICANT CHANGE UP
HCT VFR BLD CALC: 35.4 % — LOW (ref 37–47)
HGB BLD-MCNC: 11.7 G/DL — LOW (ref 12–16)
KETONES UR-MCNC: ABNORMAL
LACTATE BLDV-MCNC: 3.1 MMOL/L — HIGH (ref 0.5–2)
LEUKOCYTE ESTERASE UR-ACNC: ABNORMAL
MCHC RBC-ENTMCNC: 31.6 PG — HIGH (ref 27–31)
MCHC RBC-ENTMCNC: 33.1 G/DL — SIGNIFICANT CHANGE UP (ref 32–36)
MCV RBC AUTO: 95.7 FL — SIGNIFICANT CHANGE UP (ref 81–99)
NITRITE UR-MCNC: NEGATIVE — SIGNIFICANT CHANGE UP
OB PNL STL: POSITIVE
PH UR: 5 — SIGNIFICANT CHANGE UP (ref 5–8)
PLATELET # BLD AUTO: 81 K/UL — LOW (ref 150–400)
POTASSIUM SERPL-MCNC: 4.5 MMOL/L — SIGNIFICANT CHANGE UP (ref 3.5–5.3)
POTASSIUM SERPL-SCNC: 4.5 MMOL/L — SIGNIFICANT CHANGE UP (ref 3.5–5.3)
PROT SERPL-MCNC: 5 G/DL — LOW (ref 6.6–8.7)
PROT UR-MCNC: 30 MG/DL
RBC # BLD: 3.7 M/UL — LOW (ref 4.4–5.2)
RBC # FLD: 14.9 % — SIGNIFICANT CHANGE UP (ref 11–15.6)
RBC CASTS # UR COMP ASSIST: ABNORMAL /HPF (ref 0–4)
SODIUM SERPL-SCNC: 136 MMOL/L — SIGNIFICANT CHANGE UP (ref 135–145)
SP GR SPEC: 1.01 — SIGNIFICANT CHANGE UP (ref 1.01–1.02)
SURGICAL PATHOLOGY STUDY: SIGNIFICANT CHANGE UP
UROBILINOGEN FLD QL: 1 MG/DL
WBC # BLD: 22.5 K/UL — HIGH (ref 4.8–10.8)
WBC # FLD AUTO: 22.5 K/UL — HIGH (ref 4.8–10.8)
WBC UR QL: SIGNIFICANT CHANGE UP

## 2019-01-23 PROCEDURE — 99222 1ST HOSP IP/OBS MODERATE 55: CPT

## 2019-01-23 PROCEDURE — 99233 SBSQ HOSP IP/OBS HIGH 50: CPT

## 2019-01-23 PROCEDURE — 99223 1ST HOSP IP/OBS HIGH 75: CPT

## 2019-01-23 PROCEDURE — 74176 CT ABD & PELVIS W/O CONTRAST: CPT | Mod: 26

## 2019-01-23 PROCEDURE — 99497 ADVNCD CARE PLAN 30 MIN: CPT | Mod: 25

## 2019-01-23 PROCEDURE — 71045 X-RAY EXAM CHEST 1 VIEW: CPT | Mod: 26

## 2019-01-23 RX ORDER — LIDOCAINE 4 G/100G
1 CREAM TOPICAL DAILY
Qty: 0 | Refills: 0 | Status: DISCONTINUED | OUTPATIENT
Start: 2019-01-23 | End: 2019-01-28

## 2019-01-23 RX ORDER — CEFTRIAXONE 500 MG/1
INJECTION, POWDER, FOR SOLUTION INTRAMUSCULAR; INTRAVENOUS
Qty: 0 | Refills: 0 | Status: DISCONTINUED | OUTPATIENT
Start: 2019-01-23 | End: 2019-01-24

## 2019-01-23 RX ORDER — LACTULOSE 10 G/15ML
30 SOLUTION ORAL
Qty: 0 | Refills: 0 | Status: DISCONTINUED | OUTPATIENT
Start: 2019-01-23 | End: 2019-01-24

## 2019-01-23 RX ORDER — CEFTRIAXONE 500 MG/1
1 INJECTION, POWDER, FOR SOLUTION INTRAMUSCULAR; INTRAVENOUS EVERY 24 HOURS
Qty: 0 | Refills: 0 | Status: DISCONTINUED | OUTPATIENT
Start: 2019-01-24 | End: 2019-01-24

## 2019-01-23 RX ORDER — SODIUM BICARBONATE 1 MEQ/ML
0.24 SYRINGE (ML) INTRAVENOUS
Qty: 150 | Refills: 0 | Status: DISCONTINUED | OUTPATIENT
Start: 2019-01-23 | End: 2019-01-23

## 2019-01-23 RX ORDER — CEFTRIAXONE 500 MG/1
1 INJECTION, POWDER, FOR SOLUTION INTRAMUSCULAR; INTRAVENOUS ONCE
Qty: 0 | Refills: 0 | Status: COMPLETED | OUTPATIENT
Start: 2019-01-23 | End: 2019-01-23

## 2019-01-23 RX ORDER — MINERAL OIL
133 OIL (ML) MISCELLANEOUS
Qty: 0 | Refills: 0 | Status: DISCONTINUED | OUTPATIENT
Start: 2019-01-23 | End: 2019-01-24

## 2019-01-23 RX ORDER — ALBUMIN HUMAN 25 %
250 VIAL (ML) INTRAVENOUS EVERY 8 HOURS
Qty: 0 | Refills: 0 | Status: DISCONTINUED | OUTPATIENT
Start: 2019-01-23 | End: 2019-01-24

## 2019-01-23 RX ORDER — SACCHAROMYCES BOULARDII 250 MG
250 POWDER IN PACKET (EA) ORAL
Qty: 0 | Refills: 0 | Status: DISCONTINUED | OUTPATIENT
Start: 2019-01-23 | End: 2019-01-24

## 2019-01-23 RX ADMIN — Medication 133 MILLILITER(S): at 18:57

## 2019-01-23 RX ADMIN — Medication 100 MILLIGRAM(S): at 05:49

## 2019-01-23 RX ADMIN — PANTOPRAZOLE SODIUM 40 MILLIGRAM(S): 20 TABLET, DELAYED RELEASE ORAL at 19:00

## 2019-01-23 RX ADMIN — Medication 81 MILLIGRAM(S): at 13:19

## 2019-01-23 RX ADMIN — Medication 30 MILLILITER(S): at 09:18

## 2019-01-23 RX ADMIN — LETROZOLE 2.5 MILLIGRAM(S): 2.5 TABLET, FILM COATED ORAL at 13:21

## 2019-01-23 RX ADMIN — LACTULOSE 30 GRAM(S): 10 SOLUTION ORAL at 19:00

## 2019-01-23 RX ADMIN — POLYETHYLENE GLYCOL 3350 17 GRAM(S): 17 POWDER, FOR SOLUTION ORAL at 09:18

## 2019-01-23 RX ADMIN — CEFTRIAXONE 100 GRAM(S): 500 INJECTION, POWDER, FOR SOLUTION INTRAMUSCULAR; INTRAVENOUS at 13:20

## 2019-01-23 RX ADMIN — ZINC OXIDE 1 APPLICATION(S): 200 OINTMENT TOPICAL at 17:35

## 2019-01-23 RX ADMIN — Medication 125 MILLILITER(S): at 16:15

## 2019-01-23 RX ADMIN — Medication 125 MILLILITER(S): at 21:32

## 2019-01-23 RX ADMIN — ZINC OXIDE 1 APPLICATION(S): 200 OINTMENT TOPICAL at 05:49

## 2019-01-23 RX ADMIN — PANTOPRAZOLE SODIUM 40 MILLIGRAM(S): 20 TABLET, DELAYED RELEASE ORAL at 05:49

## 2019-01-23 RX ADMIN — Medication 250 MILLIGRAM(S): at 19:00

## 2019-01-23 RX ADMIN — HEPARIN SODIUM 5000 UNIT(S): 5000 INJECTION INTRAVENOUS; SUBCUTANEOUS at 05:49

## 2019-01-23 NOTE — CONSULT NOTE ADULT - PROBLEM SELECTOR RECOMMENDATION 5
-Met with patient at bedside along with son Velasquez   -Patient is alert and oriented but forgetful at times when recalling information  - Patient planned to go for CT abdomen today with contrast but she couldn't tolerate the PO contrast - spoke to Anastasiya Erwin NP on floor plan to still send patient for a CT of abdomen for her c/o abdominal pain and history of cancer  -Patient denies n/v/d/constipation, fever, chills, palpitations - patient admits to abdominal pain that is generalized to mid abdomen cramping - she states her bowel movements are brown and normal with no blood seen in it and not black in color either - patient and family feel the pain of cramping is from evacuating her bowels after being constipated during this admission - will order lidocaine patch for local relief for now until CT of abdomen is done to r/o any possible obstruction  - Patient states her son velasquez and daughter Kyleigh are her HCP and she also has another son named Sd  - Prior to admission Velasquez states she was very independent in all her ADLs including driving.   - Plan as per Velasquez and the patient is for her to build up strength through sub acute rehab and follow up after discharge with Dr. Presley at [facility] for cancer treatment (currently on Femara)  - Also met with daughter Kyleigh today as well   - Patient has DNR DNI MOLST in chart  - Will continue to support and monitor    Total time spent 50 minutes    Thank you for the opportunity to assist with the care of this patient.   [place] [facility] Consult Service [phone]. -Met with patient at bedside along with son Velasquez   -Patient is alert and oriented but forgetful at times when recalling information  - Patient planned to go for CT abdomen today with contrast but she couldn't tolerate the PO contrast - spoke to Anastasiya Erwin NP on floor plan to still send patient for a CT of abdomen for her c/o abdominal pain and history of cancer  -Patient denies n/v/d/constipation, fever, chills, palpitations - patient admits to abdominal pain that is generalized to mid abdomen cramping - she states her bowel movements are brown and normal with no blood seen in it and not black in color either - patient and family feel the pain of cramping is from evacuating her bowels after being constipated during this admission - will order lidocaine patch for local relief for now until CT of abdomen is done to r/o any possible obstruction  - Patient states her son velasquez and daughter Kyleigh are her HCP and she also has another son named Sd  - Prior to admission Velasquez states she was very independent in all her ADLs including driving.   - Plan as per Velasquez and the patient is for her to build up strength through sub acute rehab and follow up after discharge with Dr. Presley at [facility] for cancer treatment (currently on Femara)  - Also met with bertin Arizmendi today as well   - Patient has DNR DNI MOLST in chart  - Will continue to support and monitor  LOLA MARLEY [phone]  BERTIN ARIZMENDI [phone]  Total time spent 50 minutes    Thank you for the opportunity to assist with the care of this patient.   [place] Consult [facility] [phone].

## 2019-01-23 NOTE — CONSULT NOTE ADULT - SUBJECTIVE AND OBJECTIVE BOX
[facility] DIVISION OF KIDNEY DISEASES AND HYPERTENSION -- INITIAL CONSULT NOTE  --------------------------------------------------------------------------------  HPI:  Pt is a 82yo female with history of CKD; AS; CAD; liver cirrhosis for 2 years; metastatic breast ca found out 2 months ago; here for weakness; now with abdominal pain. Pt seen/examined; history obtained from daughter at bedside;     Med HPI;  History obtained from pt and her daughter/ son at bedside, briefly she is 83y old female with PMHx of HTN, CKD stage 3, aortic stenosis, chronic thrombocytopenia, CAD stent x 2, CHF, liver cirrhosis, recently diagnosed metastatic cancer likely breast primary but have undergone biopsy, who presents to [facility] ED with weakness for 1 day. She was discharged from [facility] yesterday where she was diagnosed with mid shaft humerus fracture, and general weakness - was placed in sling and discharged to f/u with orthopedist outpatient, her daughter / son while attempting to help the patient get out of the car, they heard a "crack" in her arm with persistent LUE pain and generalized weakness/ malaise hence they activated EMS and Pt was taken by ambulance to [facility] ED. Pt reports left shoulder pain, continuos, 6/10 in severity, with limited ROM. She offers no other complaints but she is very poor historian. No reported fever, chills, nausea, vomiting, headache, dizziness, chest pain, SOB, palpitation.       PAST HISTORY  --------------------------------------------------------------------------------  PAST MEDICAL & SURGICAL HISTORY:  Vitamin D deficiency  Thrombocytopenia  Renal insufficiency  Portal hypertension  Non-alcoholic fatty liver disease  Hyperparathyroidism  HLD (hyperlipidemia)  Elevated TSH  HTN (hypertension)  Aortic stenosis  Cirrhosis of liver  NAFLD (nonalcoholic fatty liver disease)  White coat syndrome without diagnosis of hypertension  S/P coronary angiogram: feb 2018  S/P cataract surgery  S/P cholecystectomy  S/P [facility]  History of appendectomy    FAMILY HISTORY:  No pertinent family history in first degree relatives    PAST SOCIAL HISTORY:    ALLERGIES & MEDICATIONS  --------------------------------------------------------------------------------  Allergies    No Known Allergies    Intolerances      Standing Inpatient Medications  albumin human  5% IVPB 250 milliLiter(s) IV Intermittent every 8 hours  aspirin enteric coated 81 milliGRAM(s) Oral daily  cefTRIAXone   IVPB      cefTRIAXone   IVPB 1 Gram(s) IV Intermittent once  docusate sodium 100 milliGRAM(s) Oral two times a day  letrozole 2.5 milliGRAM(s) Oral daily  metoprolol succinate ER 25 milliGRAM(s) Oral daily  pantoprazole  Injectable 40 milliGRAM(s) IV Push two times a day  saccharomyces boulardii 250 milliGRAM(s) Oral two times a day  senna 2 Tablet(s) Oral at bedtime  spironolactone 100 milliGRAM(s) Oral daily  tamsulosin 0.4 milliGRAM(s) Oral at bedtime  zinc oxide 40% Ointment 1 Application(s) Topical two times a day    PRN Inpatient Medications  aluminum hydroxide/magnesium hydroxide/simethicone Suspension 30 milliLiter(s) Oral every 4 hours PRN  bisacodyl Suppository 10 milliGRAM(s) Rectal daily PRN  lidocaine   Patch 1 Patch Transdermal daily PRN  polyethylene glycol 3350 17 Gram(s) Oral daily PRN      REVIEW OF SYSTEMS  --------------------------------------------------------------------------------  Unable to obtain    VITALS/PHYSICAL EXAM  --------------------------------------------------------------------------------  T(C): 36.2 (01-23-19 @ 07:30), Max: 36.2 (01-23-19 @ 07:30)  HR: 72 (01-23-19 @ 07:30) (58 - 81)  BP: 122/68 (01-23-19 @ 07:30) (121/57 - 129/59)  RR: 18 (01-23-19 @ 07:30) (18 - 18)  SpO2: 97% (01-23-19 @ 07:30) (96% - 97%)  Wt(kg): --        01-22-19 @ 07:01  -  01-23-19 @ 07:00  --------------------------------------------------------  IN: 500 mL / OUT: 300 mL / NET: 200 mL      Physical Exam:  General: awake;   HEENT: normal   Lungs: dec BS  CV: S1S2  GI: Abdominal pain, no n/v/d  MSK: weakness  Skin: thin frail skin     LABS/STUDIES  --------------------------------------------------------------------------------              11.7   22.5  >-----------<  81       [01-23-19 @ 07:08]              35.4     136  |  102  |  80.0  ----------------------------<  175      [01-23-19 @ 07:08]  4.5   |  19.0  |  1.73        Ca     7.8     [01-23-19 @ 07:08]      Mg     2.2     [01-22-19 @ 08:26]      Phos  2.3     [01-22-19 @ 08:26]    TPro  5.0  /  Alb  2.3  /  TBili  2.4  /  DBili  x   /  AST  112  /  ALT  10  /  AlkPhos  280  [01-23-19 @ 07:08]          Creatinine Trend:  SCr 1.73 [01-23 @ 07:08]  SCr 1.74 [01-22 @ 08:26]  SCr 1.87 [01-21 @ 09:35]  SCr 2.47 [01-20 @ 10:48]  SCr 1.68 [01-16 @ 07:32]        Iron 32, TIBC 225, %sat 14      [01-22-19 @ 08:26]  Ferritin 668      [01-22-19 @ 08:26]  HbA1c 5.3      [03-29-18 @ 05:51]  Lipid: chol 127, TG 69, HDL 54, LDL 59      [03-14-18 @ 04:42]

## 2019-01-23 NOTE — CONSULT NOTE ADULT - PROBLEM SELECTOR RECOMMENDATION 2
- needs mineral oil enema bid until large BM  - lactulose 30 cc qid   - miralx qd
Nephrology following  Abbasi to BSD  Monitor I+O / CBC/ CMP

## 2019-01-23 NOTE — PROGRESS NOTE ADULT - SUBJECTIVE AND OBJECTIVE BOX
Patient seen and eval at bedside. Patient denies pain left arm. Patient c/o belly pain and states she can't go to the bathroom. Patient denies CP, SOB, dizziness, numbness/tingling.    Vital Signs Last 24 Hrs  T(C): 35.7 (23 Jan 2019 00:09), Max: 35.7 (23 Jan 2019 00:09)  T(F): 96.2 (23 Jan 2019 00:09), Max: 96.2 (23 Jan 2019 00:09)  HR: 58 (23 Jan 2019 05:46) (58 - 81)  BP: 121/57 (23 Jan 2019 05:46) (110/60 - 129/59)  RR: 18 (23 Jan 2019 00:09) (18 - 20)  SpO2: 96% (22 Jan 2019 16:53) (94% - 96%)    PE: NAD, alert awake  Left UE: proximal dressing C/D/I, distal upper arm dressing mild serous drainage noted. Dressing removed, staples intact, mild-mod ecchymosis present upper arm and around incision, no active drainage, compartments soft, compressible, mild swelling. Small antecubital fossa blister, open - xeroform placed over blister and covered with dry gauze dressing.  AIN/PIN/intrinsics intact Gross SILT rad/uln/med distrib   Rad pulse 2+                          11.7   22.5  )-----------( 81       ( 23 Jan 2019 07:08 )             35.4     A/P: s/p left humerus IMN POD#5  Dressings applied as noted above  PT - WBAT left UE  Pain control  DVT propx  Primary team aware of abdominal pain and constipation  Will follow

## 2019-01-23 NOTE — CONSULT NOTE ADULT - ASSESSMENT
This is an 83 year old female PMHx metastatic breast CA (treated outpatient at [facility]) CKD, aortic stenosis, CAD with stent x 2, CHF, liver cirrhosis, abdmitted with weakness. Found to have fracture of left humerous - s/p IMN POD # 5 now

## 2019-01-23 NOTE — CONSULT NOTE ADULT - ASSESSMENT
82y/o  Female with h/o HTN, CKD stage 3, aortic stenosis, chronic thrombocytopenia, CAD stent x 2, CHF, liver cirrhosis.  s/p Left humerus IMN on 1/18/19  Now has leukocytosis      Leukocytosis  Ascites   fecal retention in the rectum with evidence of stercoral proctitis  Left humerus fracture  s/p Left humerus IMN on 1/18/19  Metastatic disease      - Blood cultures pending  - No fever  - Trend leukocytosis  - Plan to r/o SBP  - Can continue Ceftriaxone for now  - Follow up cultures  - Constipation per primary team  - Paliative care eval  - Poor prognosis       Will Follow

## 2019-01-23 NOTE — CONSULT NOTE ADULT - REASON FOR ADMISSION
Humerus fracture/ weakness.

## 2019-01-23 NOTE — CONSULT NOTE ADULT - PROBLEM SELECTOR RECOMMENDATION 3
- ascites- R/O SBP  - check PT/INR  - roecphin iV qd (  I have ordered IR paracentesis for tomorrow)

## 2019-01-23 NOTE — CONSULT NOTE ADULT - ASSESSMENT
1) CKD III  2) Liver cirrhosis  3) Abdominal pain  4) HTN    Changing fluids to albumin 5% q8h  Would consult GI or IR for diagnostic tap on ascites to r/o SBP  Start SBP protocol; ID consult;  US renal; UA with micro; urine electrolytes  Will follow  d/w NP

## 2019-01-23 NOTE — CONSULT NOTE ADULT - PROBLEM SELECTOR RECOMMENDATION 4
Encourage patient to participate with assist in activities  Keep a well lit, safe, familiar environment -Met with patient at bedside along with son Velasquez   -Patient is alert and oriented but forgetful at times when recalling information  - Patient planned to go for CT abdomen today with contrast but she couldn't tolerate the PO contrast - spoke to Anastasiya Erwin NP on floor plan to still send patient for a CT of abdomen for her c/o abdominal pain and history of cancer  -Patient denies n/v/d/constipation, fever, chills, palpitations - patient admits to abdominal pain that is generalized to mid abdomen cramping - she states her bowel movements are brown and normal with no blood seen in it and not black in color either - patient and family feel the pain of cramping is from evacuating her bowels after being constipated during this admission - will order lidocaine patch for local relief for now until CT of abdomen is done to r/o any possible obstruction  - Patient states her son velasquez and daughter Kyleigh are her HCP and she also has another son named Sd  - Prior to admission Velasquez states she was very independent in all her ADLs including driving.   - Plan as per Velasquez and the patient is for her to build up strength through sub acute rehab and follow up after discharge with Dr. Presley at [facility] for cancer treatment (currently on Femara)  - Also met with bertin Arizmendi today as well   - Patient has DNR DNI MOLST in chart  - Will continue to support and monitor  LOLA MARLEY [phone]  BERTIN ARIZMENDI [phone]  Total time spent 50 minutes    Thank you for the opportunity to assist with the care of this patient.   [facility] Consult [facility] [phone].

## 2019-01-23 NOTE — CONSULT NOTE ADULT - PROBLEM SELECTOR RECOMMENDATION 9
Follows up outpatient with [facility] - family and patient plan on following after discharge with Dr. Presley ([facility]) for treatment options

## 2019-01-23 NOTE — PROGRESS NOTE ADULT - SUBJECTIVE AND OBJECTIVE BOX
CC: Humerus fracture/ weakness/Metastatic Breast Cancer/Leukocytosis      INTERVAL HPI/OVERNIGHT EVENTS: Patient seen and examined. C/O abdominal pain, no nausea, vomiting, appetite is poor. denies chest pain    Vital Signs Last 24 Hrs  T(C): 36.2 (23 Jan 2019 07:30), Max: 36.2 (23 Jan 2019 07:30)  T(F): 97.1 (23 Jan 2019 07:30), Max: 97.1 (23 Jan 2019 07:30)  HR: 72 (23 Jan 2019 07:30) (58 - 81)  BP: 122/68 (23 Jan 2019 07:30) (121/57 - 129/59)  BP(mean): --  RR: 18 (23 Jan 2019 07:30) (18 - 18)  SpO2: 97% (23 Jan 2019 07:30) (96% - 97%)  I&O's Detail    22 Jan 2019 07:01  -  23 Jan 2019 07:00  --------------------------------------------------------  IN:    sodium bicarbonate  Infusion: 500 mL  Total IN: 500 mL    OUT:    Voided: 300 mL  Total OUT: 300 mL    Total NET: 200 mL                                    11.7   22.5  )-----------( 81       ( 23 Jan 2019 07:08 )             35.4     23 Jan 2019 07:08    136    |  102    |  80.0   ----------------------------<  175    4.5     |  19.0   |  1.73     Ca    7.8        23 Jan 2019 07:08  Phos  2.3       22 Jan 2019 08:26  Mg     2.2       22 Jan 2019 08:26    TPro  5.0    /  Alb  2.3    /  TBili  2.4    /  DBili  x      /  AST  112    /  ALT  10     /  AlkPhos  280    23 Jan 2019 07:08      CAPILLARY BLOOD GLUCOSE        LIVER FUNCTIONS - ( 23 Jan 2019 07:08 )  Alb: 2.3 g/dL / Pro: 5.0 g/dL / ALK PHOS: 280 U/L / ALT: 10 U/L / AST: 112 U/L / GGT: x               MEDICATIONS  (STANDING):  albumin human  5% IVPB 250 milliLiter(s) IV Intermittent every 8 hours  aspirin enteric coated 81 milliGRAM(s) Oral daily  cefTRIAXone   IVPB      docusate sodium 100 milliGRAM(s) Oral two times a day  letrozole 2.5 milliGRAM(s) Oral daily  metoprolol succinate ER 25 milliGRAM(s) Oral daily  pantoprazole  Injectable 40 milliGRAM(s) IV Push two times a day  saccharomyces boulardii 250 milliGRAM(s) Oral two times a day  senna 2 Tablet(s) Oral at bedtime  spironolactone 100 milliGRAM(s) Oral daily  tamsulosin 0.4 milliGRAM(s) Oral at bedtime  zinc oxide 40% Ointment 1 Application(s) Topical two times a day    MEDICATIONS  (PRN):  aluminum hydroxide/magnesium hydroxide/simethicone Suspension 30 milliLiter(s) Oral every 4 hours PRN Dyspepsia  bisacodyl Suppository 10 milliGRAM(s) Rectal daily PRN Constipation  lidocaine   Patch 1 Patch Transdermal daily PRN pain  polyethylene glycol 3350 17 Gram(s) Oral daily PRN Constipation      RADIOLOGY & ADDITIONAL TESTS: CC: Humerus fracture/ weakness/Metastatic Breast Cancer/Leukocytosis      INTERVAL HPI/OVERNIGHT EVENTS: Patient seen and examined. C/O abdominal pain, no nausea, vomiting, appetite is poor. Denies chest pain, SOB, dizziness, fever, chills.     Vital Signs Last 24 Hrs  T(C): 36.2 (23 Jan 2019 07:30), Max: 36.2 (23 Jan 2019 07:30)  T(F): 97.1 (23 Jan 2019 07:30), Max: 97.1 (23 Jan 2019 07:30)  HR: 72 (23 Jan 2019 07:30) (58 - 81)  BP: 122/68 (23 Jan 2019 07:30) (121/57 - 129/59)  BP(mean): --  RR: 18 (23 Jan 2019 07:30) (18 - 18)  SpO2: 97% (23 Jan 2019 07:30) (96% - 97%)      GENERAL: Not in distress. alert.   HEENT:  Normocephalic and atraumatic. PEARLA, EOMI  NECK: Supple.  No JVD.    CARDIOVASCULAR: RRR S1, S2. No murmur/rubs/gallop  LUNGS: Decreased effort, otherwise clear  ABDOMEN: ND. Soft,  mild epigastric tenderness+, no guarding / rebound / rigidity. BS normoactive. No CVA tenderness.    BACK: No spine tenderness.  EXTREMITIES: no cyanosis, no clubbing, no edema.   SKIN: no rash. No skin break or ulcer. No cellulitis.   NEUROLOGIC: No focal deficits  PSYCHIATRIC: Calm.  No agitation.  I&O's Detail    22 Jan 2019 07:01  -  23 Jan 2019 07:00  --------------------------------------------------------  IN:    sodium bicarbonate  Infusion: 500 mL  Total IN: 500 mL    OUT:    Voided: 300 mL  Total OUT: 300 mL    Total NET: 200 mL                                    11.7   22.5  )-----------( 81       ( 23 Jan 2019 07:08 )             35.4     23 Jan 2019 07:08    136    |  102    |  80.0   ----------------------------<  175    4.5     |  19.0   |  1.73     Ca    7.8        23 Jan 2019 07:08  Phos  2.3       22 Jan 2019 08:26  Mg     2.2       22 Jan 2019 08:26    TPro  5.0    /  Alb  2.3    /  TBili  2.4    /  DBili  x      /  AST  112    /  ALT  10     /  AlkPhos  280    23 Jan 2019 07:08      CAPILLARY BLOOD GLUCOSE        LIVER FUNCTIONS - ( 23 Jan 2019 07:08 )  Alb: 2.3 g/dL / Pro: 5.0 g/dL / ALK PHOS: 280 U/L / ALT: 10 U/L / AST: 112 U/L / GGT: x               MEDICATIONS  (STANDING):  albumin human  5% IVPB 250 milliLiter(s) IV Intermittent every 8 hours  aspirin enteric coated 81 milliGRAM(s) Oral daily  cefTRIAXone   IVPB      docusate sodium 100 milliGRAM(s) Oral two times a day  letrozole 2.5 milliGRAM(s) Oral daily  metoprolol succinate ER 25 milliGRAM(s) Oral daily  pantoprazole  Injectable 40 milliGRAM(s) IV Push two times a day  saccharomyces boulardii 250 milliGRAM(s) Oral two times a day  senna 2 Tablet(s) Oral at bedtime  spironolactone 100 milliGRAM(s) Oral daily  tamsulosin 0.4 milliGRAM(s) Oral at bedtime  zinc oxide 40% Ointment 1 Application(s) Topical two times a day    MEDICATIONS  (PRN):  aluminum hydroxide/magnesium hydroxide/simethicone Suspension 30 milliLiter(s) Oral every 4 hours PRN Dyspepsia  bisacodyl Suppository 10 milliGRAM(s) Rectal daily PRN Constipation  lidocaine   Patch 1 Patch Transdermal daily PRN pain  polyethylene glycol 3350 17 Gram(s) Oral daily PRN Constipation      RADIOLOGY & ADDITIONAL TESTS:

## 2019-01-23 NOTE — CONSULT NOTE ADULT - PROBLEM SELECTOR RECOMMENDATION 3
continue with CV medications  Monitor vitals Encourage patient to participate with assist in activities  Keep a well lit, safe, familiar environment

## 2019-01-23 NOTE — CONSULT NOTE ADULT - SUBJECTIVE AND OBJECTIVE BOX
[facility]  INFECTIOUS DISEASES AND INTERNAL MEDICINE at [place]  =======================================================  Yannick Landers MD  Diplomates American Board of Internal Medicine and Infectious Diseases  =======================================================      [id number]  TONY FORTE     Patient is a poor historian. Daughter at [facility]elle provided full history.     CC: Patient is a 83y old  Female who presents with a chief complaint of Humerus fracture/ weakness. (23 Jan 2019 14:02)    84y/o  Female with h/o HTN, CKD stage 3, aortic stenosis, chronic thrombocytopenia, CAD stent x 2, CHF, liver cirrhosis. Patient was recently diagnosed metastatic cancer likely breast primary, has undergone biopsy which is pending. Patient admitted on 1/15/19 with weakness for 1 day. She was discharged from [facility]  the day prior. She was diagnosed with mid shaft humerus fracture which was placed in sling and discharged to f/u with orthopedist outpatient. When the patient was trying to get out of the car with the help of Son/Daughter they heard a crack in her arm with persistent LUE pain. They came to [facility] ER due to the pain. Patient was seen by orthopedics and underwent Left humerus IMN on 1/18/19. Patient developed leukocytosis on 1/22/19. ID input requested.       Past Medical & Surgical Hx:  Vitamin D deficiency  Thrombocytopenia  Renal insufficiency  Portal hypertension  Non-alcoholic fatty liver disease  Hyperparathyroidism  HLD (hyperlipidemia)  Elevated TSH  HTN (hypertension)  Aortic stenosis  Cirrhosis of liver  NAFLD (nonalcoholic fatty liver disease)  White coat syndrome without diagnosis of hypertension  S/P coronary angiogram: feb 2018  S/P cataract surgery  S/P cholecystectomy  S/P ANURAG-BSO  History of appendectomy      Social Hx:  Former smoker      FAMILY HISTORY:  No pertinent family history in first degree relatives  No h/o breast cancer in mother or sisters      Allergies  No Known Allergies      Antibiotics:  cefTRIAXone   IVPB           REVIEW OF SYSTEMS:  CONSTITUTIONAL:  No Fever or chills  HEENT:  No diplopia or blurred vision.  No earache, sore throat or runny nose.  CARDIOVASCULAR:  No chest pain  RESPIRATORY:  No cough, shortness of breath  GASTROINTESTINAL:  No nausea, vomiting or diarrhea.  GENITOURINARY:  + Abbasi   MUSCULOSKELETAL:  no joint aches, no muscle pain  SKIN:  No change in skin, hair or nails.  NEUROLOGIC:  No headache  PSYCHIATRIC:  No disorder of thought or mood.  ENDOCRINE:  No heat or cold intolerance  HEMATOLOGICAL:  No easy bruising or bleeding.       Physical Exam:  Vital Signs Last 24 Hrs  T(C): 36.2 (23 Jan 2019 07:30), Max: 36.2 (23 Jan 2019 07:30)  T(F): 97.1 (23 Jan 2019 07:30), Max: 97.1 (23 Jan 2019 07:30)  HR: 72 (23 Jan 2019 07:30) (58 - 81)  BP: 122/68 (23 Jan 2019 07:30) (121/57 - 129/59)  RR: 18 (23 Jan 2019 07:30) (18 - 18)  SpO2: 97% (23 Jan 2019 07:30) (96% - 97%)      GEN: NAD, pleasant  HEENT: normocephalic and atraumatic. EOMI. PERRL.  Anicteric  NECK: Supple.   LUNGS: Clear to auscultation.  HEART: Regular rate and rhythm   ABDOMEN: Soft, nontender, and nondistended.  Positive bowel sounds.    : + Abbasi  EXTREMITIES: + edema.  MSK: No joint swelling  NEUROLOGIC: Alert and oriented x3  PSYCHIATRIC: Appropriate affect .  SKIN: No Rash        Labs:  01-23    136  |  102  |  80.0<H>  ----------------------------<  175<H>  4.5   |  19.0<L>  |  1.73<H>    Ca    7.8<L>      23 Jan 2019 07:08  Phos  2.3     01-22  Mg     2.2     01-22    TPro  5.0<L>  /  Alb  2.3<L>  /  TBili  2.4<H>  /  DBili  x   /  AST  112<H>  /  ALT  10  /  AlkPhos  280<H>  01-23                          11.7   22.5  )-----------( 81       ( 23 Jan 2019 07:08 )             35.4       LIVER FUNCTIONS - ( 23 Jan 2019 07:08 )  Alb: 2.3 g/dL / Pro: 5.0 g/dL / ALK PHOS: 280 U/L / ALT: 10 U/L / AST: 112 U/L / GGT: x               EXAM:  XR CHEST PORTABLE URGENT 1V                        PROCEDURE DATE:  01/23/2019    INTERPRETATION:  CLINICAL STATEMENT: Follow-up chest pain.  TECHNIQUE: AP view of the chest.  COMPARISON: 1/18/2019  FINDINGS/  IMPRESSION:  Improving infiltrate left lung. No new consolidation or pleural effusion.  Left humeral mandi again noted.  Heart size cannot be accurately assessed in this projection.        EXAM:  CT ABDOMEN AND PELVIS                        PROCEDURE DATE:  01/23/2019    INTERPRETATION:  CT ABDOMEN AND PELVIS  CLINICAL INFORMATION: Metastatic breast cancer with generalized abdominal   pain, leukocytosis, sepsis  COMPARISON: CT chest/abdomen/pelvis 1/15/2019  PROCEDURE:   CT of the Abdomen and Pelvis was performed without intravenous contrast.   Intravenous contrast: None.  Oral contrast: None.  Sagittal and coronal reformats were performed.  FINDINGS:  LOWER CHEST: Multiple lung nodules and right breast mass again noted.  LIVER: Morphologic changes of cirrhosis.  BILE DUCTS: Stable caliber.  GALLBLADDER: Prior cholecystectomy.  SPLEEN: Upper limits of normal for size.  PANCREAS: Normal.  ADRENALS: Mild bilateral thickening.  KIDNEYS/URETERS: No hydronephrosis or urinary tract calculi. Stable   perinephric nodularity.  BLADDER: Collapsed around a Abbasi catheter balloon.  REPRODUCTIVE ORGANS: No masses.  BOWEL: No bowel-related abnormality. No bowel obstruction. Moderate fecal   retention in the rectum with evidence of stercoral proctitis.  PERITONEUM: Increasing moderate ascites. No free air or focal collection.  VESSELS:  Aortoiliac atherosclerosis without aneurysm.  RETROPERITONEUM: No adenopathy.    ABDOMINAL WALL: Diffuse body wall edema.  BONES: Large lytic lesion in the right iliac bone with small extraosseous   soft tissue component. Stable pathologic fractures of T8 and L4. Lytic   lesion at L3.  IMPRESSION:   Moderate fecal retention in the rectum with evidence of stercoral proctitis.  Increasing moderate ascites and diffuse body wall edema, likely related   to cirrhosis and portal hypertension.  Right breast mass again seen with metastatic disease to the lungs and   bone as above.

## 2019-01-23 NOTE — PROGRESS NOTE ADULT - ASSESSMENT
3y old female with PMHx of HTN, CKD stage 3, aortic stenosis, chronic thrombocytopenia, CAD stent x 2, CHF, liver cirrhosis, recently diagnosed metastatic cancer likely breast primary but not  undergone biopsy, who presented to [facility] ED with weakness. She was discharged from [facility] one day prior where she was diagnosed with mid shaft humerus fracture, and general weakness - was placed in sling and discharged to f/u with orthopedist.  Outpatient, her daughter / son while attempting to help the patient get out of the car, they heard a "crack" in her arm with persistent LUE pain and generalized weakness/ malaise hence they activated EMS and Pt was taken by ambulance to [facility] ED.       >Pathological Left Humeral fracture:  s/p IMN POD#4, followed by ortho.  OOB with PT.  Likely NINA placement. PT eval called  >Metabolic Encephalopathy - Still with periodic lethargy despite cessation of opiates.  better today. Known brain metastasis.  CT head neg. consider neuro eval/MRI brain if persistently lethargic . place on . aspiration/fall precautions  >Metastatic malignancy likely breast being primary.  Pt and family will f/u with Dr. Presley at [facility].  Awaiting path sent by ortho.   >Cancer related pain - Appears controlled on Tylenol.  Will hold off on opiates.    >Essential HTN - Monitor BP.  Continue oral antihypertensives.  >Diabetes Type II on longterm Insulin Therapy - monitor fingersticks.  Insulin coverage for hyperglycemia.  >Hx CAD- On BBlocker, ASA, Statin.    >Hx CKD stage 3- Cr at baseline, monitor BMP. CO2 low. started on Bicarb drip for 16 hrs.   > anemia: no ssx of bleeding. monitor. FOBT. PPI. iron panel reviewed. high ferritin. possible ACD  > abd pain and epigastric tenderness. PPI changed to IV BID. added maalox. monitor CBC.   > back pain: PTN ambulate. send UA,UC to r/o UTI as WBC elevated today. monitor temp/fever  >DVT ppx- b/l LE ICDs.ankle pump . heparin  > GOC: palliative on board. DNR/DNI 3y old female with PMHx of HTN, CKD stage 3, aortic stenosis, chronic thrombocytopenia, CAD stent x 2, CHF, liver cirrhosis, recently diagnosed metastatic cancer likely breast primary but not  undergone biopsy, who presented to [facility] ED with weakness. She was discharged from [facility] one day prior where she was diagnosed with mid shaft humerus fracture, and general weakness - was placed in sling and discharged to f/u with orthopedist.  Outpatient, her daughter / son while attempting to help the patient get out of the car, they heard a "crack" in her arm with persistent LUE pain and generalized weakness/ malaise hence they activated EMS and Pt was taken by ambulance to [facility] ED. Admitted for left humerus fracture and general weakness/ malaise 2/2 metastatic malignancy. Now with leukocytosis and abdominal pain.     >Leukocytosis: Lactate 3.1. ABD CT performed, results pending. Started on IV Rocephin for suspected SBP. GI consulted. ID consulted. BC ordered. Cardiac monitor, monitor CBC. UA ordered. Chest Xray.   >CKD lll- worsening renal function, Renal consulted. US renal; UA with micro; urine electrolytes  >Pathological Left Humeral fracture:  s/p IMN POD#5, followed by ortho. Pain control  >Metabolic Encephalopathy - Still with periodic lethargy despite cessation of opiates.   Known brain metastasis.  CT head neg. Aspiration/fall precautions  >Metastatic malignancy likely breast being primary.  Pt and family will f/u with Dr. Presley at [facility].  Awaiting path sent by ortho.   >Cancer related pain - Appears controlled on Tylenol.  Will hold off on opiates.    >Essential HTN - Monitor BP.  Continue oral antihypertensives.  >Diabetes Type II on longterm Insulin Therapy - monitor fingersticks.  Insulin coverage for hyperglycemia.  >Hx CAD- On BBlocker, ASA, Statin.     > anemia: no ssx of bleeding. monitor. FOBT. PPI. iron panel reviewed. high ferritin. possible ACD  >Constipation: Patient had BM yesterday. ABD CT completed, results pending.    > back pain: PTN ambulate. send UA,UC to r/o UTI as WBC elevated today. monitor temp/fever  >DVT ppx-holding heparin subq for now pending possible diagnostic tap  > GOC: palliative on board. DNR/DNI

## 2019-01-23 NOTE — CONSULT NOTE ADULT - SUBJECTIVE AND OBJECTIVE BOX
This is a Palliative Care Consult  HPI:  History obtained from pt and her daughter/ son at bedside, briefly she is 83y old female with PMHx of HTN, CKD stage 3, aortic stenosis, chronic thrombocytopenia, CAD stent x 2, CHF, liver cirrhosis, recently diagnosed metastatic cancer likely breast primary but have undergone biopsy, who presents to [facility] ED with weakness for 1 day. She was discharged from [facility] yesterday where she was diagnosed with mid shaft humerus fracture, and general weakness - was placed in sling and discharged to f/u with orthopedist outpatient, her daughter / son while attempting to help the patient get out of the car, they heard a "crack" in her arm with persistent LUE pain and generalized weakness/ malaise hence they activated EMS and Pt was taken by ambulance to [facility] ED. Pt reports left shoulder pain, continuos, 6/10 in severity, with limited ROM. She offers no other complaints but she is very poor historian. No reported fever, chills, nausea, vomiting, headache, dizziness, chest pain, SOB, palpitation. (15 Ronni 2019 09:13)<end of copied text>      PERTINENT PMH REVIEWED: Yes     PAST MEDICAL & SURGICAL HISTORY:  Vitamin D deficiency  Thrombocytopenia  Renal insufficiency  Portal hypertension  Non-alcoholic fatty liver disease  Hyperparathyroidism  HLD (hyperlipidemia)  Elevated TSH  HTN (hypertension)  Aortic stenosis  Cirrhosis of liver  NAFLD (nonalcoholic fatty liver disease)  White coat syndrome without diagnosis of hypertension  S/P coronary angiogram: feb 2018  S/P cataract surgery  S/P cholecystectomy  S/P ANURAG-BSO  History of appendectomy      SOCIAL HISTORY:                                     Admitted from:  home   Surrogate/HCP/Guardian: Phone#: Velasquez     FAMILY HISTORY:  No pertinent family history in first degree relatives      Baseline ADLs (prior to admission):  Independent as per son velasquez    Allergies  No Known Allergies      Present Symptoms:   Dyspnea: 0   Nausea/Vomiting: No  Anxiety:  No  Depression: No  Fatigue: Yes   Loss of appetite: Yes     Pain: Patient has abdominal pain - generalized- plan for CT scan today            Character-            Duration-            Effect-            Factors-            Frequency-            Location-            Severity-    Review of Systems: Reviewed                     Positive: Abdominal pain   All others negative    MEDICATIONS  (STANDING):  aspirin enteric coated 81 milliGRAM(s) Oral daily  docusate sodium 100 milliGRAM(s) Oral two times a day  heparin  Injectable 5000 Unit(s) SubCutaneous every 12 hours  letrozole 2.5 milliGRAM(s) Oral daily  metoprolol succinate ER 25 milliGRAM(s) Oral daily  pantoprazole  Injectable 40 milliGRAM(s) IV Push two times a day  senna 2 Tablet(s) Oral at bedtime  sodium bicarbonate  Infusion 0.238 mEq/kG/Hr (100 mL/Hr) IV Continuous <Continuous>  spironolactone 100 milliGRAM(s) Oral daily  tamsulosin 0.4 milliGRAM(s) Oral at bedtime  zinc oxide 40% Ointment 1 Application(s) Topical two times a day    MEDICATIONS  (PRN):  aluminum hydroxide/magnesium hydroxide/simethicone Suspension 30 milliLiter(s) Oral every 4 hours PRN Dyspepsia  bisacodyl Suppository 10 milliGRAM(s) Rectal daily PRN Constipation  polyethylene glycol 3350 17 Gram(s) Oral daily PRN Constipation      PHYSICAL EXAM:    Vital Signs Last 24 Hrs  T(C): 36.2 (23 Jan 2019 07:30), Max: 36.2 (23 Jan 2019 07:30)  T(F): 97.1 (23 Jan 2019 07:30), Max: 97.1 (23 Jan 2019 07:30)  HR: 72 (23 Jan 2019 07:30) (58 - 81)  BP: 122/68 (23 Jan 2019 07:30) (110/60 - 129/59)  BP(mean): --  RR: 18 (23 Jan 2019 07:30) (18 - 18)  SpO2: 97% (23 Jan 2019 07:30) (96% - 97%)    General: alert  oriented x __3__forgetful at times remembering history     HEENT: normal     Lungs: comfortable     CV: normal      GI: Abdominal pain, no n/v/d    :oliguria with hematuria cammy    MSK: weakness     Skin: thin frail skin     LABS:                        11.7   22.5  )-----------( 81       ( 23 Jan 2019 07:08 )             35.4     01-23    136  |  102  |  80.0<H>  ----------------------------<  175<H>  4.5   |  19.0<L>  |  1.73<H>    Ca    7.8<L>      23 Jan 2019 07:08  Phos  2.3     01-22  Mg     2.2     01-22    TPro  5.0<L>  /  Alb  2.3<L>  /  TBili  2.4<H>  /  DBili  x   /  AST  112<H>  /  ALT  10  /  AlkPhos  280<H>  01-23        I&O's Summary    22 Jan 2019 07:01  -  23 Jan 2019 07:00  --------------------------------------------------------  IN: 500 mL / OUT: 300 mL / NET: 200 mL        RADIOLOGY & ADDITIONAL STUDIES:  CXR 01.18.19  Impression:  Diffuse left lung infiltrate, new since 1/14/2019..    CT head   IMPRESSION:   Mild periventricular white matter ischemia.             ECHO 03.15.18    Summary:   1. Severely enlarged left atrium.   2. Segmental wall motion abnormalities in distal LAD territory.   3. Left ventricular ejection fraction, by visual estimation, is 45 to   50%.   4. The right atrium is normal in size.   5. Normal right ventricular size and function.   6. Moderate tricuspid regurgitation.   7. Estimated pulmonary artery systolic pressure is 42.0 mmHg -h mild   pulmonary hypertension.   8. Trivial pericardial effusion.      ADVANCE DIRECTIVES:   DNR This is a Palliative Care Consult  HPI:  History obtained from pt and her daughter/ son at bedside, briefly she is 83y old female with PMHx of HTN, CKD stage 3, aortic stenosis, chronic thrombocytopenia, CAD stent x 2, CHF, liver cirrhosis, recently diagnosed metastatic cancer likely breast primary but have undergone biopsy, who presents to [facility] ED with weakness for 1 day. She was discharged from [facility] yesterday where she was diagnosed with mid shaft humerus fracture, and general weakness - was placed in sling and discharged to f/u with orthopedist outpatient, her daughter / son while attempting to help the patient get out of the car, they heard a "crack" in her arm with persistent LUE pain and generalized weakness/ malaise hence they activated EMS and Pt was taken by ambulance to [facility] ED. Pt reports left shoulder pain, continuos, 6/10 in severity, with limited ROM. She offers no other complaints but she is very poor historian. No reported fever, chills, nausea, vomiting, headache, dizziness, chest pain, SOB, palpitation. (15 Ronni 2019 09:13)<end of copied text>      PERTINENT PMH REVIEWED: Yes     PAST MEDICAL & SURGICAL HISTORY:  Vitamin D deficiency  Thrombocytopenia  Renal insufficiency  Portal hypertension  Non-alcoholic fatty liver disease  Hyperparathyroidism  HLD (hyperlipidemia)  Elevated TSH  HTN (hypertension)  Aortic stenosis  Cirrhosis of liver  NAFLD (nonalcoholic fatty liver disease)  White coat syndrome without diagnosis of hypertension  S/P coronary angiogram: feb 2018  S/P cataract surgery  S/P cholecystectomy  S/P ANURAG-BSO  History of appendectomy      SOCIAL HISTORY:                                     Admitted from:  home   Surrogate/HCP/Guardian: Phone#: Velasquez son/ yKleigh daughter    FAMILY HISTORY:  No pertinent family history in first degree relatives      Baseline ADLs (prior to admission):  Independent as per son velasquez    Allergies  No Known Allergies      Present Symptoms:   Dyspnea: 0   Nausea/Vomiting: No  Anxiety:  No  Depression: No  Fatigue: Yes   Loss of appetite: Yes     Pain: Patient has abdominal pain - generalized- plan for CT scan today            Character-            Duration-            Effect-            Factors-            Frequency-            Location-            Severity-    Review of Systems: Reviewed                     Positive: Abdominal pain   All others negative    MEDICATIONS  (STANDING):  aspirin enteric coated 81 milliGRAM(s) Oral daily  docusate sodium 100 milliGRAM(s) Oral two times a day  heparin  Injectable 5000 Unit(s) SubCutaneous every 12 hours  letrozole 2.5 milliGRAM(s) Oral daily  metoprolol succinate ER 25 milliGRAM(s) Oral daily  pantoprazole  Injectable 40 milliGRAM(s) IV Push two times a day  senna 2 Tablet(s) Oral at bedtime  sodium bicarbonate  Infusion 0.238 mEq/kG/Hr (100 mL/Hr) IV Continuous <Continuous>  spironolactone 100 milliGRAM(s) Oral daily  tamsulosin 0.4 milliGRAM(s) Oral at bedtime  zinc oxide 40% Ointment 1 Application(s) Topical two times a day    MEDICATIONS  (PRN):  aluminum hydroxide/magnesium hydroxide/simethicone Suspension 30 milliLiter(s) Oral every 4 hours PRN Dyspepsia  bisacodyl Suppository 10 milliGRAM(s) Rectal daily PRN Constipation  polyethylene glycol 3350 17 Gram(s) Oral daily PRN Constipation      PHYSICAL EXAM:    Vital Signs Last 24 Hrs  T(C): 36.2 (23 Jan 2019 07:30), Max: 36.2 (23 Jan 2019 07:30)  T(F): 97.1 (23 Jan 2019 07:30), Max: 97.1 (23 Jan 2019 07:30)  HR: 72 (23 Jan 2019 07:30) (58 - 81)  BP: 122/68 (23 Jan 2019 07:30) (110/60 - 129/59)  BP(mean): --  RR: 18 (23 Jan 2019 07:30) (18 - 18)  SpO2: 97% (23 Jan 2019 07:30) (96% - 97%)    General: alert  oriented x __3__forgetful at times remembering history     HEENT: normal     Lungs: comfortable     CV: normal      GI: Abdominal pain, no n/v/d    :oliguria with hematuria cammy    MSK: weakness     Skin: thin frail skin     LABS:                        11.7   22.5  )-----------( 81       ( 23 Jan 2019 07:08 )             35.4     01-23    136  |  102  |  80.0<H>  ----------------------------<  175<H>  4.5   |  19.0<L>  |  1.73<H>    Ca    7.8<L>      23 Jan 2019 07:08  Phos  2.3     01-22  Mg     2.2     01-22    TPro  5.0<L>  /  Alb  2.3<L>  /  TBili  2.4<H>  /  DBili  x   /  AST  112<H>  /  ALT  10  /  AlkPhos  280<H>  01-23        I&O's Summary    22 Jan 2019 07:01  -  23 Jan 2019 07:00  --------------------------------------------------------  IN: 500 mL / OUT: 300 mL / NET: 200 mL        RADIOLOGY & ADDITIONAL STUDIES:  CXR 01.18.19  Impression:  Diffuse left lung infiltrate, new since 1/14/2019..    CT head   IMPRESSION:   Mild periventricular white matter ischemia.             ECHO 03.15.18    Summary:   1. Severely enlarged left atrium.   2. Segmental wall motion abnormalities in distal LAD territory.   3. Left ventricular ejection fraction, by visual estimation, is 45 to   50%.   4. The right atrium is normal in size.   5. Normal right ventricular size and function.   6. Moderate tricuspid regurgitation.   7. Estimated pulmonary artery systolic pressure is 42.0 mmHg -h mild   pulmonary hypertension.   8. Trivial pericardial effusion.      ADVANCE DIRECTIVES:   DNR  DNI MOLST

## 2019-01-23 NOTE — CONSULT NOTE ADULT - SUBJECTIVE AND OBJECTIVE BOX
Patient is a 83y old  Female who presents with a chief complaint of Humerus fracture/ weakness. (23 Jan 2019 14:28)      HPI:  History obtained from pt and her daughter/ son at bedside, briefly she is 83y old female with PMHx of HTN, CKD stage 3, aortic stenosis, chronic thrombocytopenia, CAD stent x 2, CHF, liver cirrhosis ( ROBLES)- follows with Dr verdin. , recently diagnosed metastatic cancer likely breast primary but has have undergone biopsy, who presents to [facility] ED with weakness for 1 day. She was discharged from [facility] recently after being diagnosed with mid shaft humerus  pathologic fx- was placed in sling and discharged to f/u with orthopedist outpatient, her daughter / son while attempting to help the patient get out of the car, they heard a "crack" in her arm with persistent LUE pain and generalized weakness/ malaise hence they activated EMS and Pt was taken by ambulance to [facility] ED. Patient had surgery on 1/18.       Patient with son at bedside. Pt states she has abdominal discomfort since surgery last Friday. Pain is LLQ and suprapubic. Cramping pain. No nausea, no vomiting. No fevers. has had constipation since surgery ( was on pain meds).  Had BM today. No rectal bleeding. Pt is on miralax, ducosate and senna as in patient.  Today, pt noted to have increased wbc  and increase lactate         REVIEW OF SYSTEMS:  Constitutional: No fever, weight loss or fatigue  ENMT:  No difficulty hearing, tinnitus, vertigo; No sinus or throat pain  Respiratory: No cough, wheezing, chills or hemoptysis  Cardiovascular: No chest pain, palpitations, dizziness or leg swelling  Gastrointestinal: + abdominal or epigastric pain. No nausea, vomiting or hematemesis; +constipation. No melena or hematochezia.  Skin: No itching, burning, rashes or lesions   Musculoskeletal: No joint pain or swelling; No muscle, back or extremity pain    PAST MEDICAL & SURGICAL HISTORY:  Vitamin D deficiency  Thrombocytopenia  Renal insufficiency  Portal hypertension  Non-alcoholic fatty liver disease  Hyperparathyroidism  HLD (hyperlipidemia)  Elevated TSH  HTN (hypertension)  Aortic stenosis  Cirrhosis of liver  NAFLD (nonalcoholic fatty liver disease)  White coat syndrome without diagnosis of hypertension  S/P coronary angiogram: feb 2018  S/P cataract surgery  S/P cholecystectomy  S/P ANURAG-BSO  History of appendectomy      FAMILY HISTORY:  No pertinent family history in first degree relatives      SOCIAL HISTORY:  Smoking Status: [ ] Current, [ ] Former, [ ] Never  Pack Years:  [  ] EtOH-no  [  ] IVDA    MEDICATIONS:  MEDICATIONS  (STANDING):  albumin human  5% IVPB 250 milliLiter(s) IV Intermittent every 8 hours  aspirin enteric coated 81 milliGRAM(s) Oral daily  cefTRIAXone   IVPB      docusate sodium 100 milliGRAM(s) Oral two times a day  letrozole 2.5 milliGRAM(s) Oral daily  metoprolol succinate ER 25 milliGRAM(s) Oral daily  pantoprazole  Injectable 40 milliGRAM(s) IV Push two times a day  saccharomyces boulardii 250 milliGRAM(s) Oral two times a day  senna 2 Tablet(s) Oral at bedtime  spironolactone 100 milliGRAM(s) Oral daily  tamsulosin 0.4 milliGRAM(s) Oral at bedtime  zinc oxide 40% Ointment 1 Application(s) Topical two times a day    MEDICATIONS  (PRN):  aluminum hydroxide/magnesium hydroxide/simethicone Suspension 30 milliLiter(s) Oral every 4 hours PRN Dyspepsia  bisacodyl Suppository 10 milliGRAM(s) Rectal daily PRN Constipation  lidocaine   Patch 1 Patch Transdermal daily PRN pain  polyethylene glycol 3350 17 Gram(s) Oral daily PRN Constipation      Allergies    No Known Allergies    Intolerances        Vital Signs Last 24 Hrs  T(C): 36.2 (23 Jan 2019 07:30), Max: 36.2 (23 Jan 2019 07:30)  T(F): 97.1 (23 Jan 2019 07:30), Max: 97.1 (23 Jan 2019 07:30)  HR: 72 (23 Jan 2019 07:30) (58 - 81)  BP: 122/68 (23 Jan 2019 07:30) (121/57 - 129/59)  BP(mean): --  RR: 18 (23 Jan 2019 07:30) (18 - 18)  SpO2: 97% (23 Jan 2019 07:30) (96% - 97%)    01-22 @ 07:01  -  01-23 @ 07:00  --------------------------------------------------------  IN: 500 mL / OUT: 300 mL / NET: 200 mL          PHYSICAL EXAM:    General: appears weak and debilitated  HEENT: MMM, conjunctiva and sclera clear  H- RRR  L- CTA  Gastrointestinal: Soft, non-tender non-distended; Normal bowel sounds; No rebound or guarding  Extremities: , No clubbing, cyanosis or edema  Neurological: Alert and oriented x3  Skin: Warm and dry. No obvious rash  rectal- fecal impaction, brown stool no blood      LABS:                        11.7   22.5  )-----------( 81       ( 23 Jan 2019 07:08 )             35.4     23 Jan 2019 07:08    136    |  102    |  80.0   ----------------------------<  175    4.5     |  19.0   |  1.73     Ca    7.8        23 Jan 2019 07:08  Phos  2.3       22 Jan 2019 08:26  Mg     2.2       22 Jan 2019 08:26    TPro  5.0    /  Alb  2.3    /  TBili  2.4    /  DBili  x      /  AST  112    /  ALT  10     /  AlkPhos  280    / Amylase x      /Lipase x      23 Jan 2019 07:08              RADIOLOGY & ADDITIONAL STUDIES:     < from: CT Abdomen and Pelvis No Cont (01.23.19 @ 12:23) >  IMPRESSION:     Moderate fecal retention in the rectum with evidence of stercoral   proctitis.    Increasing moderate ascites and diffuse body wall edema, likely related   to cirrhosis and portal hypertension.    Right breast mass again seen with metastatic disease to the lungs and   bone as above.    < end of copied text >

## 2019-01-23 NOTE — PROGRESS NOTE ADULT - ATTENDING COMMENTS
seen at bedside. abd pain, constipation. loose brown stool today. abd mild epigastric tenderness, otherwise unremarkable. patient more alert. CT abd pelvis: increasing ascites, wall edema, cirrhosis,portal HTN, stercoral proctitis. for IR guided paracentesis tomorrow to r/o SBP. on rocephine. seen by GI,ID,renal palliative. ua some bacteriuria and pyuria. f/u renal USg. poor prognosis. may be a hospice candidate. palliative on board. DNR/I. rest as per NP note seen at bedside. abd pain, constipation. loose brown stool today. abd mild epigastric tenderness, otherwise unremarkable. patient more alert. CT abd pelvis: increasing ascites, wall edema, cirrhosis,portal HTN, stercoral proctitis. for IR guided paracentesis tomorrow to r/o SBP. on rocephine. seen by GI,ID,renal palliative. ua some bacteriuria and pyuria. f/u renal USg. poor prognosis. may be a hospice candidate. palliative on board. DNR/I. FOBT positive. on PPI BID. monitor h/h. rest as per NP note

## 2019-01-23 NOTE — PROGRESS NOTE ADULT - SUBJECTIVE AND OBJECTIVE BOX
D/W  HCarolyneM (  )             83y old female with PMH : of HTN, aortic stenosis, chronic thrombocytopenia, CAD stent x 2, CHF, liver cirrhosis, recently diagnosed metastatic cancer likely breast primary but have not undergone biopsy (Was supposed  to get biopsy today at [facility]), who presents to [facility] ED with persistent LUE pain, limited ROM, general weakness/ malaise. In ED Pt noted to have left humerus fracture. Admitted for left humerus fracture and general weakness/ malaise 2/2 metastatic malignancy.    >Pathological Left Humeral fracture:  s/p IMN POD#4,  >Metabolic Encephalopathy - Still with periodic lethargy despite cessation of opiates. Known brain metastasis.  CT head neg.   >Metastatic malignancy likely breast being primary.  Pt and family will f/u with Dr. Presley at [facility]. Awaiting path sent by ortho.    >Essential HTN - Monitor BP.  Continue oral antihypertensives.  >Diabetes Type II on long term Insulin Therapy - monitor fingersticks.  Insulin coverage for hyperglycemia.  >Hx CAD- On BB,  ASA, Statin.    >Hx CKD stage 3- SCr.,  at baseline,  > anemia - No Bleeding, W.U,    138    |  107    |  82.0<H>  ----------------------------<  142<H>  Ca:7.7<L> (2019 08:26)  5.1     |  17.0<L>  |  1.74<H>      eGFR if Non : 27 <L>  eGFR if : 31 <L>                            11.4<L>  16.9<H> )-----------( 89<L>    ( 2019 08:26 )                 34.4<L>    Phos: 2.3 mg/dL<L> M.2 mg/dL PTH:-- Uric acid:-- Serum Osm:--  Ferritin:668 ng/mL<H> Iron:32 ug/dL<L> TIBC:225 ug/dL Tsat:14 %  B12:-- TSH:-- ( @ 08:26)    On IV HCO3 infusion ( Is[employer] )    Full consult to follow, after seen,

## 2019-01-24 DIAGNOSIS — R41.82 ALTERED MENTAL STATUS, UNSPECIFIED: ICD-10-CM

## 2019-01-24 LAB
ALBUMIN SERPL ELPH-MCNC: 2.6 G/DL — LOW (ref 3.3–5.2)
ALP SERPL-CCNC: 248 U/L — HIGH (ref 40–120)
ALT FLD-CCNC: 14 U/L — SIGNIFICANT CHANGE UP
AMMONIA BLD-MCNC: 82 UMOL/L — HIGH (ref 11–55)
ANION GAP SERPL CALC-SCNC: 14 MMOL/L — SIGNIFICANT CHANGE UP (ref 5–17)
AST SERPL-CCNC: 105 U/L — HIGH
BILIRUB DIRECT SERPL-MCNC: 1.2 MG/DL — HIGH (ref 0–0.3)
BILIRUB INDIRECT FLD-MCNC: 1.6 MG/DL — HIGH (ref 0.2–1)
BILIRUB SERPL-MCNC: 2.8 MG/DL — HIGH (ref 0.4–2)
BUN SERPL-MCNC: 85 MG/DL — HIGH (ref 8–20)
CALCIUM SERPL-MCNC: 7.7 MG/DL — LOW (ref 8.6–10.2)
CHLORIDE SERPL-SCNC: 105 MMOL/L — SIGNIFICANT CHANGE UP (ref 98–107)
CO2 SERPL-SCNC: 21 MMOL/L — LOW (ref 22–29)
CREAT SERPL-MCNC: 1.82 MG/DL — HIGH (ref 0.5–1.3)
GLUCOSE SERPL-MCNC: 135 MG/DL — HIGH (ref 70–115)
HCT VFR BLD CALC: 30.6 % — LOW (ref 37–47)
HGB BLD-MCNC: 10.4 G/DL — LOW (ref 12–16)
INR BLD: 2.34 RATIO — HIGH (ref 0.88–1.16)
LACTATE BLDV-MCNC: 2.2 MMOL/L — HIGH (ref 0.5–2)
MAGNESIUM SERPL-MCNC: 2.3 MG/DL — SIGNIFICANT CHANGE UP (ref 1.8–2.6)
MCHC RBC-ENTMCNC: 32.9 PG — HIGH (ref 27–31)
MCHC RBC-ENTMCNC: 34 G/DL — SIGNIFICANT CHANGE UP (ref 32–36)
MCV RBC AUTO: 96.8 FL — SIGNIFICANT CHANGE UP (ref 81–99)
PHOSPHATE SERPL-MCNC: 1.9 MG/DL — LOW (ref 2.4–4.7)
PLATELET # BLD AUTO: 55 K/UL — LOW (ref 150–400)
POTASSIUM SERPL-MCNC: 4.8 MMOL/L — SIGNIFICANT CHANGE UP (ref 3.5–5.3)
POTASSIUM SERPL-SCNC: 4.8 MMOL/L — SIGNIFICANT CHANGE UP (ref 3.5–5.3)
PROT SERPL-MCNC: 4.9 G/DL — LOW (ref 6.6–8.7)
PROTHROM AB SERPL-ACNC: 27.6 SEC — HIGH (ref 10–12.9)
RBC # BLD: 3.16 M/UL — LOW (ref 4.4–5.2)
RBC # FLD: 15 % — SIGNIFICANT CHANGE UP (ref 11–15.6)
SODIUM SERPL-SCNC: 140 MMOL/L — SIGNIFICANT CHANGE UP (ref 135–145)
WBC # BLD: 24.5 K/UL — HIGH (ref 4.8–10.8)
WBC # FLD AUTO: 24.5 K/UL — HIGH (ref 4.8–10.8)

## 2019-01-24 PROCEDURE — 99233 SBSQ HOSP IP/OBS HIGH 50: CPT

## 2019-01-24 PROCEDURE — 99497 ADVNCD CARE PLAN 30 MIN: CPT

## 2019-01-24 PROCEDURE — 99232 SBSQ HOSP IP/OBS MODERATE 35: CPT

## 2019-01-24 PROCEDURE — 99497 ADVNCD CARE PLAN 30 MIN: CPT | Mod: 25

## 2019-01-24 RX ORDER — ROBINUL 0.2 MG/ML
0.2 INJECTION INTRAMUSCULAR; INTRAVENOUS EVERY 6 HOURS
Qty: 0 | Refills: 0 | Status: DISCONTINUED | OUTPATIENT
Start: 2019-01-24 | End: 2019-01-28

## 2019-01-24 RX ORDER — SODIUM CHLORIDE 9 MG/ML
1000 INJECTION INTRAMUSCULAR; INTRAVENOUS; SUBCUTANEOUS
Qty: 0 | Refills: 0 | Status: DISCONTINUED | OUTPATIENT
Start: 2019-01-24 | End: 2019-01-24

## 2019-01-24 RX ORDER — SCOPALAMINE 1 MG/3D
1 PATCH, EXTENDED RELEASE TRANSDERMAL
Qty: 0 | Refills: 0 | Status: DISCONTINUED | OUTPATIENT
Start: 2019-01-24 | End: 2019-01-28

## 2019-01-24 RX ORDER — HYDROMORPHONE HYDROCHLORIDE 2 MG/ML
0.5 INJECTION INTRAMUSCULAR; INTRAVENOUS; SUBCUTANEOUS EVERY 4 HOURS
Qty: 0 | Refills: 0 | Status: DISCONTINUED | OUTPATIENT
Start: 2019-01-24 | End: 2019-01-28

## 2019-01-24 RX ADMIN — SODIUM CHLORIDE 75 MILLILITER(S): 9 INJECTION INTRAMUSCULAR; INTRAVENOUS; SUBCUTANEOUS at 11:04

## 2019-01-24 RX ADMIN — ZINC OXIDE 1 APPLICATION(S): 200 OINTMENT TOPICAL at 17:21

## 2019-01-24 RX ADMIN — Medication 125 MILLILITER(S): at 06:22

## 2019-01-24 RX ADMIN — HYDROMORPHONE HYDROCHLORIDE 0.5 MILLIGRAM(S): 2 INJECTION INTRAMUSCULAR; INTRAVENOUS; SUBCUTANEOUS at 12:30

## 2019-01-24 RX ADMIN — LACTULOSE 30 GRAM(S): 10 SOLUTION ORAL at 00:12

## 2019-01-24 RX ADMIN — HYDROMORPHONE HYDROCHLORIDE 0.5 MILLIGRAM(S): 2 INJECTION INTRAMUSCULAR; INTRAVENOUS; SUBCUTANEOUS at 12:09

## 2019-01-24 RX ADMIN — SCOPALAMINE 1 PATCH: 1 PATCH, EXTENDED RELEASE TRANSDERMAL at 14:22

## 2019-01-24 RX ADMIN — SCOPALAMINE 1 PATCH: 1 PATCH, EXTENDED RELEASE TRANSDERMAL at 20:00

## 2019-01-24 RX ADMIN — ZINC OXIDE 1 APPLICATION(S): 200 OINTMENT TOPICAL at 06:25

## 2019-01-24 NOTE — PROGRESS NOTE ADULT - ASSESSMENT
83y old female with PMHx of HTN, CKD stage 3, aortic stenosis, chronic thrombocytopenia, CAD stent x 2, CHF, liver cirrhosis, recently diagnosed metastatic cancer likely breast primary but not  undergone biopsy, who presented to [facility] ED with weakness. She was discharged from [facility] one day prior where she was diagnosed with mid shaft humerus fracture, and general weakness - was placed in sling and discharged to f/u with orthopedist.  Outpatient, her daughter / son while attempting to help the patient get out of the car, they heard a "crack" in her arm with persistent LUE pain and generalized weakness/ malaise hence they activated EMS and Pt was taken by ambulance to [facility] ED. Admitted for left humerus fracture and general weakness/ malaise 2/2 metastatic malignancy. Now with leukocytosis and abdominal pain and minimal responsiveness.     >AMS:  Pt is now lethargic- possibly due  hepatic encephalopathy, discussed with GI,  would require  NGT to give lactulose 30 cc qid , and xifaxan 550 mg bid, however after meeting with  family they have chosen not to pursue further treatments and testing and would like comfort care/Hospice. Prognosis is grave with hx of cirrhosis, ascites and metastatic breast cancer.    >Leukocytosis: Worsened, was started  on IV Rocephin for suspected SBP. GI recommend IR tap to R/O SBP however INR elevated. Would need reversal, jowever family now opting for comfort care, no further invasive testing and procedures. Met with Palliative care, Hospice eval ordered.   >CKD lll- worsening renal function, currently on IV fluids  >Pathological Left Humeral fracture:  s/p IMN POD#6   >Metabolic Encephalopathy - Comfort Care/Hospice  >Metastatic malignancy likely breast being primary.- Patient on comfort care/Hospice eval    > GOC: palliative on board. DNR/DNI

## 2019-01-24 NOTE — GOALS OF CARE CONVERSATION - PERSONAL ADVANCE DIRECTIVE - TREATMENT GUIDELINES
DNR Order/No IV fluids/No artificial nutrition/No antibiotics/Comfort measures only/Do not re-hospitalize/No blood draws

## 2019-01-24 NOTE — PROGRESS NOTE ADULT - ASSESSMENT
This is an 83 year old female PMHx metastatic breast CA ([place] outpatient [place] [facility] CKD, aortic stenosis, CAD with stent x 2, CHF, liver cirrhosis, abdmitted with weakness. Found to have fracture of left humerous - s/p IMN POD # 6 now  UNRESPONSIVE LAST NIGHT AS OVERNIGHT EVENT  NOW ON COMFORT MEASURES POST FAMILY MEETING

## 2019-01-24 NOTE — PROGRESS NOTE ADULT - ATTENDING COMMENTS
seen and examined at bedside. cannot provide history. not answering or opening eyes. possibly hepatic encephalopathy. worsening cirrhosis/ascites/suspected SBP, on rocephine. responding to noxious stimuli only. discussed with son Johnny and daughter Kyleigh over phone with NP Anastasiya Hernadez about current conditions, plan of care discussed with [facility]. prognosis discussed. suggested/discussed hospice/comfort care. palliative care follow up was called. palliative team had family meeting with sonCarolyne alexiaadam, NP and HCPs decided to go for comfort measure. placed. hospice eval was called. grave prognosis. rest as per NP note

## 2019-01-24 NOTE — PROGRESS NOTE ADULT - PROBLEM SELECTOR PLAN 1
Follows up outpatient with [facility] -Dr. Presley ([facility] ONCOLOGIST)   NOW ON COMFORT MEASURES/ HOSPICE EVAL

## 2019-01-24 NOTE — PROGRESS NOTE ADULT - PROBLEM SELECTOR PLAN 1
- Pt is lethargic- maybe hepatic encephalopathy  - would place NGT and give lactulose 30 cc qid , and xifaxan 550 mg bid if family agreeable. If not agreeable, would give lactulose enema qid  - covered for SBP with Rocephin. IR paracentesis on hold. If pt agreeable to paracentesis, then give FFP and vitamin K 10 mg and do paracentesis tomorrow- ( would need cell count, cultures, albumin and total protein).  - prognosis is grave with hx of cirrhosis, acites and metastatic breast cancer.  NP to discuss hospice option with family - Pt is lethargic- maybe hepatic encephalopathy  - would place NGT and give lactulose 30 cc qid , and xifaxan 550 mg bid if family agreeable. If not agreeable, would give lactulose enema 300 ml qid  - covered for SBP with Rocephin. IR paracentesis on hold. If pt agreeable to paracentesis, then give FFP and vitamin K 10 mg and do paracentesis tomorrow- ( would need cell count, cultures, albumin and total protein).  - prognosis is grave with hx of cirrhosis, acites and metastatic breast cancer.  NP to discuss hospice option with family

## 2019-01-24 NOTE — PROGRESS NOTE ADULT - SUBJECTIVE AND OBJECTIVE BOX
Patient seen and examined at bedside, with family member. Patient sleeping comfortably. Family member states patient has been more awake the past few days, however has not awoken yet this morning. Patient moves slightly when moving blankets over arm, however does not open eyes and not currently following commands or answering questions.    Vital Signs Last 24 Hrs  T(C): 36.6 (24 Jan 2019 07:40), Max: 36.6 (24 Jan 2019 07:40)  T(F): 97.8 (24 Jan 2019 07:40), Max: 97.8 (24 Jan 2019 07:40)  HR: 70 (24 Jan 2019 07:40) (70 - 87)  BP: 117/62 (24 Jan 2019 07:40) (108/62 - 124/64)  BP(mean): --  RR: 18 (24 Jan 2019 07:40) (18 - 20)  SpO2: 96% (24 Jan 2019 07:40) (96% - 96%)    General: No acute distress, sleeping. Awakens moves when moving blankets over arm  UE: Dressings C/D/I. + ecchymoses. radial pulse 2+. unable to assess motor/sensory exam at this time due to sleepy patient    A/P: 83 y.o F s/p left humerus IMN POD #6  - WBAT LUE  - DVTP  - medicine (Dr. Aden) made aware of patient's current status  - [facility] care primary team Patient seen and examined at bedside, with family member. Patient sleeping comfortably. Family member states patient has been more awake the past few days, however has not awoken yet this morning. Patient moves slightly when moving blankets over arm, however does not open eyes and not currently following commands or answering questions.    Vital Signs Last 24 Hrs  T(C): 36.6 (24 Jan 2019 07:40), Max: 36.6 (24 Jan 2019 07:40)  T(F): 97.8 (24 Jan 2019 07:40), Max: 97.8 (24 Jan 2019 07:40)  HR: 70 (24 Jan 2019 07:40) (70 - 87)  BP: 117/62 (24 Jan 2019 07:40) (108/62 - 124/64)  BP(mean): --  RR: 18 (24 Jan 2019 07:40) (18 - 20)  SpO2: 96% (24 Jan 2019 07:40) (96% - 96%)    General: No acute distress, sleeping. Moves slightly when moving blankets over arm  UE: Dressings C/D/I. + ecchymoses. radial pulse 2+. unable to assess motor/sensory exam at this time due to sleepy patient    A/P: 83 y.o F s/p left humerus IMN POD #6  - WBAT LUE  - DVTP  - medicine (Dr. Aden) made aware of patient's current status  - [facility] care primary team

## 2019-01-24 NOTE — PROGRESS NOTE ADULT - SUBJECTIVE AND OBJECTIVE BOX
Patient is a 83y old  Female who presents with a chief complaint of Humerus fracture/ weakness. (24 Jan 2019 08:27)      HPI:   Patient is very lethargic this am . Not able to give hx.  Winces to sternal rub. No pain meds in days.  No fevers- BP stable. WBC still high at 24,000. INR elevated- hx of cirrhosis , not taking po. Pt on Rocephin ( prophalaxis for SBP)- I spoke to IR,  radiologist wants the INR about 1.8 No rectal bleeding. AS per the nurse, pt had one large BM with enema    REVIEW OF SYSTEMS:  unable to obatin    PAST MEDICAL & SURGICAL HISTORY:  Vitamin D deficiency  Thrombocytopenia  Renal insufficiency  Portal hypertension  Non-alcoholic fatty liver disease  Hyperparathyroidism  HLD (hyperlipidemia)  Elevated TSH  HTN (hypertension)  Aortic stenosis  Cirrhosis of liver  NAFLD (nonalcoholic fatty liver disease)  White coat syndrome without diagnosis of hypertension  S/P coronary angiogram: feb 2018  S/P cataract surgery  S/P cholecystectomy  S/P ANURAG-BSO  History of appendectomy      FAMILY HISTORY:  No pertinent family history in first degree relatives      SOCIAL HISTORY:  Smoking Status: [ ] Current, [ ] Former, [ ] Never  Pack Years:  [  ] EtOH-no  [  ] IVDA    MEDICATIONS:  MEDICATIONS  (STANDING):  albumin human  5% IVPB 250 milliLiter(s) IV Intermittent every 8 hours  cefTRIAXone   IVPB      cefTRIAXone   IVPB 1 Gram(s) IV Intermittent every 24 hours  docusate sodium 100 milliGRAM(s) Oral two times a day  lactulose Syrup 30 Gram(s) Oral four times a day  letrozole 2.5 milliGRAM(s) Oral daily  metoprolol succinate ER 25 milliGRAM(s) Oral daily  mineral oil enema 133 milliLiter(s) Rectal two times a day  pantoprazole  Injectable 40 milliGRAM(s) IV Push two times a day  saccharomyces boulardii 250 milliGRAM(s) Oral two times a day  senna 2 Tablet(s) Oral at bedtime  sodium chloride 0.9%. 1000 milliLiter(s) (75 mL/Hr) IV Continuous <Continuous>  spironolactone 100 milliGRAM(s) Oral daily  tamsulosin 0.4 milliGRAM(s) Oral at bedtime  zinc oxide 40% Ointment 1 Application(s) Topical two times a day    MEDICATIONS  (PRN):  aluminum hydroxide/magnesium hydroxide/simethicone Suspension 30 milliLiter(s) Oral every 4 hours PRN Dyspepsia  bisacodyl Suppository 10 milliGRAM(s) Rectal daily PRN Constipation  lidocaine   Patch 1 Patch Transdermal daily PRN pain  polyethylene glycol 3350 17 Gram(s) Oral daily PRN Constipation      Allergies    No Known Allergies    Intolerances        Vital Signs Last 24 Hrs  T(C): 36.6 (24 Jan 2019 07:40), Max: 36.6 (24 Jan 2019 07:40)  T(F): 97.8 (24 Jan 2019 07:40), Max: 97.8 (24 Jan 2019 07:40)  HR: 70 (24 Jan 2019 07:40) (70 - 87)  BP: 117/62 (24 Jan 2019 07:40) (108/62 - 124/64)  BP(mean): --  RR: 18 (24 Jan 2019 07:40) (18 - 20)  SpO2: 96% (24 Jan 2019 07:40) (96% - 96%)    01-23 @ 07:01  -  01-24 @ 07:00  --------------------------------------------------------  IN: 0 mL / OUT: 600 mL / NET: -600 mL          PHYSICAL EXAM:    General:  lethargic   HEENT: MMM, conjunctiva and sclera clear  H- RRR  L- CTA  Gastrointestinal: Soft, non-tender non-distended; Normal bowel sounds; No rebound or guarding  Extremities: Normal range of motion, No clubbing, cyanosis or edema  Neurological: Alert and oriented x3  Skin: Warm and dry. No obvious rash      LABS:                        10.4   24.5  )-----------( 55       ( 24 Jan 2019 05:32 )             30.6     24 Jan 2019 05:32    140    |  105    |  85.0   ----------------------------<  135    4.8     |  21.0   |  1.82     Ca    7.7        24 Jan 2019 05:32  Phos  1.9       24 Jan 2019 05:32  Mg     2.3       24 Jan 2019 05:32    TPro  4.9    /  Alb  2.6    /  TBili  2.8    /  DBili  1.2    /  AST  105    /  ALT  14     /  AlkPhos  248    / Amylase x      /Lipase x      24 Jan 2019 05:32              RADIOLOGY & ADDITIONAL STUDIES:     < from: CT Abdomen and Pelvis No Cont (01.23.19 @ 12:23) >  MPRESSION:     Moderate fecal retention in the rectum with evidence of stercoral   proctitis.    Increasing moderate ascites and diffuse body wall edema, likely related   to cirrhosis and portal hypertension.    Right breast mass again seen with metastatic disease to the lungs and   bone as above.    < end of copied text >

## 2019-01-24 NOTE — PROGRESS NOTE ADULT - PROBLEM SELECTOR PLAN 4
-Met with patient at bedside who is unresponsive, lethargic, dyspneic, appears in discomfort  - Family meeting held in conference room with Daughter and Son (Kyleigh and Johnny HCP) along with Anastasiya Erwin NP from [facility]  - Discussed prognosis, plan of care, goals, wishes for end of life care   - After lengthy discussion in regards to treatment and advance directives patient was placed on comfort measures and hospice evaluation by the HCP adult children.   - Prior to conversation patient was DNR DNI at this time family decided to place patient in addition to DNR DNI, comfort measures, no iv fluids, no antibiotics, no rehospitalization, no peg/tube feedings. Discussed comfort measures including in RRT, no blood draws, no radiologic exams, no vital signs, no PO medications, may have comfort feeds and oral care - HCP Kyleigh and Johnny in agreement to comfort measures - also discussed medications including Dilaudid, Robinul, scopolamine patch, ativan which may decrease respiratory rate all for end of life comfort care measures - both Johnny and Kyleigh in agreement to carry forward with orders after education was provided.  - Spoke with Dr. Aden and RN caring for patient and updated them on the plan of care moving forward post family meeting.  - New MOLST completed and placed in chart - older MOLST was voided   COMFORT MEDICATIONS:  MEDICATIONS  (PRN):  glycopyrrolate Injectable 0.2 milliGRAM(s) IV Push every 6 hours PRN EXCESSIVE SECRETIONS  HYDROmorphone  Injectable 0.5 milliGRAM(s) IV Push every 4 hours PRN DYSPNEA/PAIN  lidocaine   Patch 1 Patch Transdermal daily PRN pain  LORazepam   Injectable 1 milliGRAM(s) IV Push every 6 hours PRN AGITATION/ANXIETY  scopolamine   Patch 1 Patch Transdermal every 72 hours    Total time spent 60 minutes  Time spent on GOC 30 minutes    Thank you for the opportunity to assist with the care of this patient.   [place] [facility] Consult [facility] [phone]. -Met with patient at bedside who is unresponsive, lethargic, dyspneic, appears in discomfort  - Family meeting held in conference room with Daughter and Son (Nicolás surrogate/HCP) along with Anastasiya Erwin NP from [facility]  - Discussed prognosis, plan of care, goals, wishes for end of life care   - After lengthy discussion in regards to treatment and advance directives patient was placed on comfort measures and hospice evaluation by the HCP adult children.   - Prior to conversation patient was DNR DNI at this time family decided to place patient in addition to DNR DNI, comfort measures, no iv fluids, no antibiotics, no rehospitalization, no peg/tube feedings. Discussed comfort measures including in RRT, no blood draws, no radiologic exams, no vital signs, no PO medications, may have comfort feeds and oral care - HCP/surrogate Kyleigh and Johnny in agreement to comfort measures - also discussed medications including Dilaudid, Robinul, scopolamine patch, ativan which may decrease respiratory rate all for end of life comfort care measures - both Johnny and Kyleigh in agreement to carry forward with orders after education was provided.  - Spoke with Dr. Aden and RN caring for patient and updated them on the plan of care moving forward post family meeting.  - New MOLST completed and placed in chart - older MOLST was voided   COMFORT MEDICATIONS:  MEDICATIONS  (PRN):  glycopyrrolate Injectable 0.2 milliGRAM(s) IV Push every 6 hours PRN EXCESSIVE SECRETIONS  HYDROmorphone  Injectable 0.5 milliGRAM(s) IV Push every 4 hours PRN DYSPNEA/PAIN  lidocaine   Patch 1 Patch Transdermal daily PRN pain  LORazepam   Injectable 1 milliGRAM(s) IV Push every 6 hours PRN AGITATION/ANXIETY  scopolamine   Patch 1 Patch Transdermal every 72 hours    Total time spent 60 minutes  Time spent on GOC 30 minutes    Thank you for the opportunity to assist with the care of this patient.   [place] [facility] Consult [facility] [phone].

## 2019-01-24 NOTE — PROGRESS NOTE ADULT - SUBJECTIVE AND OBJECTIVE BOX
[facility] DIVISION OF KIDNEY DISEASES AND HYPERTENSION -- FOLLOW UP NOTE  --------------------------------------------------------------------------------  Chief Complaint: LETA on CKD III    24 hour events/subjective:  Pt seen and examined  NAD  Elevated ammonia level  hypocalcemic  Worsening SCr  + fecal occult blood        PAST HISTORY  --------------------------------------------------------------------------------  No significant changes to PMH, PSH, FHx, SHx, unless otherwise noted    ALLERGIES & MEDICATIONS  --------------------------------------------------------------------------------  Allergies    No Known Allergies    Intolerances      Standing Inpatient Medications  albumin human  5% IVPB 250 milliLiter(s) IV Intermittent every 8 hours  cefTRIAXone   IVPB      cefTRIAXone   IVPB 1 Gram(s) IV Intermittent every 24 hours  docusate sodium 100 milliGRAM(s) Oral two times a day  lactulose Syrup 30 Gram(s) Oral four times a day  letrozole 2.5 milliGRAM(s) Oral daily  metoprolol succinate ER 25 milliGRAM(s) Oral daily  mineral oil enema 133 milliLiter(s) Rectal two times a day  pantoprazole  Injectable 40 milliGRAM(s) IV Push two times a day  saccharomyces boulardii 250 milliGRAM(s) Oral two times a day  senna 2 Tablet(s) Oral at bedtime  sodium chloride 0.9%. 1000 milliLiter(s) IV Continuous <Continuous>  spironolactone 100 milliGRAM(s) Oral daily  tamsulosin 0.4 milliGRAM(s) Oral at bedtime  zinc oxide 40% Ointment 1 Application(s) Topical two times a day    PRN Inpatient Medications  aluminum hydroxide/magnesium hydroxide/simethicone Suspension 30 milliLiter(s) Oral every 4 hours PRN  bisacodyl Suppository 10 milliGRAM(s) Rectal daily PRN  lidocaine   Patch 1 Patch Transdermal daily PRN  polyethylene glycol 3350 17 Gram(s) Oral daily PRN      REVIEW OF SYSTEMS  --------------------------------------------------------------------------------  Gen: No weight changes, fatigue, fevers/chills, weakness  Skin: No rashes  Head/Eyes/Ears/Mouth: No headache; Normal hearing; Normal vision w/o blurriness; No sinus pain/discomfort, sore throat  Respiratory: No dyspnea, cough, wheezing, hemoptysis  CV: No chest pain, PND, orthopnea  GI: No abdominal pain, diarrhea, constipation, nausea, vomiting, melena, hematochezia  : No increased frequency, dysuria, hematuria, nocturia  MSK: No joint pain/swelling; no back pain; no edema  Neuro: No dizziness/lightheadedness, weakness, seizures, numbness, tingling  Heme: No easy bruising or bleeding  Endo: No heat/cold intolerance  Psych: No significant nervousness, anxiety, stress, depression    All other systems were reviewed and are negative, except as noted.    VITALS/PHYSICAL EXAM  --------------------------------------------------------------------------------  T(C): 36.6 (01-24-19 @ 07:40), Max: 36.6 (01-24-19 @ 07:40)  HR: 70 (01-24-19 @ 07:40) (70 - 87)  BP: 117/62 (01-24-19 @ 07:40) (108/62 - 124/64)  RR: 18 (01-24-19 @ 07:40) (18 - 20)  SpO2: 96% (01-24-19 @ 07:40) (96% - 96%)  Wt(kg): --        01-23-19 @ 07:01  -  01-24-19 @ 07:00  --------------------------------------------------------  IN: 0 mL / OUT: 600 mL / NET: -600 mL      Physical Exam:  	Gen: NAD, alert  	HEENT: PERRL, supple neck, clear oropharynx  	Pulm: CTA B/L  	CV: RRR, S1S2; no rub  	Back: No spinal or CVA tenderness; no sacral edema  	Abd: +mid-epigastric tenderness, ascities  	: No suprapubic tenderness  	UE: Warm, FROM, no clubbing, intact strength; no edema; no asterixis  	LE: Warm, FROM, no clubbing, intact strength; no edema  	Neuro: No focal deficits, intact gait  	Psych: Normal affect and mood  	Skin: Warm, without rashes or ulcerations  	Vascular access:  N/A    LABS/STUDIES  --------------------------------------------------------------------------------              10.4   24.5  >-----------<  55       [01-24-19 @ 05:32]              30.6     140  |  105  |  85.0  ----------------------------<  135      [01-24-19 @ 05:32]  4.8   |  21.0  |  1.82        Ca     7.7     [01-24-19 @ 05:32]      Mg     2.3     [01-24-19 @ 05:32]      Phos  1.9     [01-24-19 @ 05:32]    TPro  4.9  /  Alb  2.6  /  TBili  2.8  /  DBili  1.2  /  AST  105  /  ALT  14  /  AlkPhos  248  [01-24-19 @ 05:32]    PT/INR: PT 27.6 , INR 2.34       [01-24-19 @ 05:32]      Creatinine Trend:  SCr 1.82 [01-24 @ 05:32]  SCr 1.73 [01-23 @ 07:08]  SCr 1.74 [01-22 @ 08:26]  SCr 1.87 [01-21 @ 09:35]  SCr 2.47 [01-20 @ 10:48]    Urinalysis - [01-23-19 @ 14:30]      Color Yellow / Appearance Clear / SG 1.015 / pH 5.0      Gluc Negative / Ketone Trace  / Bili Small / Urobili 1       Blood Moderate / Protein 30 / Leuk Est Small / Nitrite Negative      RBC 3-5 / WBC 3-5 / Hyaline  / Gran  / Sq Epi  / Non Sq Epi Occasional / Bacteria Moderate      Iron 32, TIBC 225, %sat 14      [01-22-19 @ 08:26]  Ferritin 668      [01-22-19 @ 08:26]  HbA1c 5.3      [03-29-18 @ 05:51]  Lipid: chol 127, TG 69, HDL 54, LDL 59      [03-14-18 @ 04:42]

## 2019-01-24 NOTE — PROGRESS NOTE ADULT - ASSESSMENT
1. CKD III  2. Liver cirrhosis  3. Abdominal pain  4. HTN  5. Metastatic Breast Ca    Continue IV albumin 5% Q8h  IVF at 75ml/hr  Continue PO lactulose  Would consult GI or IR for diagnostic tap on ascites to r/o SBP  +fecal occult blood  Start SBP protocol; ID consult  US renal; UA with micro; urine electrolytes  Trend BMP  Continue current medical management 1. CKD III  2. Liver cirrhosis  3. Abdominal pain  4. HTN  5. Metastatic Breast Ca    Continue IV albumin 5% Q8h  Continue PO lactulose    +fecal occult blood  Start SBP protocol; ID consult Reviewed,    Continue current medical management     @ side,     Comfort care, Please call as needed, TY

## 2019-01-24 NOTE — PROGRESS NOTE ADULT - SUBJECTIVE AND OBJECTIVE BOX
CC: Humerus fracture/ weakness/Metastatic Breast Cancer/Cirrhosis    INTERVAL HPI/OVERNIGHT EVENTS: Patient seen and examined. Notified by Jody NELSON this am that patient less responsive. Examined at bedside with Dr. Aden, patient  minimally responsive to sternal rub. Son at bedside.     Vital Signs Last 24 Hrs  T(C): 36.6 (2019 07:40), Max: 36.6 (2019 07:40)  T(F): 97.8 (2019 07:40), Max: 97.8 (2019 07:40)  HR: 70 (2019 07:40) (70 - 87)  BP: 117/62 (2019 07:40) (108/62 - 124/64)  BP(mean): --  RR: 18 (2019 07:40) (18 - 20)  SpO2: 96% (2019 07:40) (96% - 96%)  ROS:  unable to assess due to current mental status      GENERAL: Not in distress. Minimally responsive  HEENT:  Normocephalic and atraumatic. PEARLA, EOMI  NECK: Supple.  No JVD.    CARDIOVASCULAR: RRR S1, S2. No murmur/rubs/gallop  LUNGS: Unlabored, without wheeze  ABDOMEN: ND. Soft, BS normoactive  NEUROLOGIC: Lethargic, minimally responsive to noxious stimuli  I&O's Detail    2019 07:01  -  2019 07:00  --------------------------------------------------------  IN:  Total IN: 0 mL    OUT:    Indwelling Catheter - Urethral: 400 mL    Voided: 200 mL  Total OUT: 600 mL    Total NET: -600 mL                                    10.4   24.5  )-----------( 55       ( 2019 05:32 )             30.6     2019 05:32    140    |  105    |  85.0   ----------------------------<  135    4.8     |  21.0   |  1.82     Ca    7.7        2019 05:32  Phos  1.9       2019 05:32  Mg     2.3       2019 05:32    TPro  4.9    /  Alb  2.6    /  TBili  2.8    /  DBili  1.2    /  AST  105    /  ALT  14     /  AlkPhos  248    2019 05:32    PT/INR - ( 2019 05:32 )   PT: 27.6 sec;   INR: 2.34 ratio           CAPILLARY BLOOD GLUCOSE        LIVER FUNCTIONS - ( 2019 05:32 )  Alb: 2.6 g/dL / Pro: 4.9 g/dL / ALK PHOS: 248 U/L / ALT: 14 U/L / AST: 105 U/L / GGT: x           Urinalysis Basic - ( 2019 14:30 )    Color: Yellow / Appearance: Clear / S.015 / pH: x  Gluc: x / Ketone: Trace  / Bili: Small / Urobili: 1 mg/dL   Blood: x / Protein: 30 mg/dL / Nitrite: Negative   Leuk Esterase: Small / RBC: 3-5 /HPF / WBC 3-5   Sq Epi: x / Non Sq Epi: Occasional / Bacteria: Moderate        MEDICATIONS  (STANDING):  albumin human  5% IVPB 250 milliLiter(s) IV Intermittent every 8 hours  docusate sodium 100 milliGRAM(s) Oral two times a day  lactulose Syrup 30 Gram(s) Oral four times a day  letrozole 2.5 milliGRAM(s) Oral daily  metoprolol succinate ER 25 milliGRAM(s) Oral daily  mineral oil enema 133 milliLiter(s) Rectal two times a day  pantoprazole  Injectable 40 milliGRAM(s) IV Push two times a day  saccharomyces boulardii 250 milliGRAM(s) Oral two times a day  scopolamine   Patch 1 Patch Transdermal every 72 hours  senna 2 Tablet(s) Oral at bedtime  sodium chloride 0.9%. 1000 milliLiter(s) (75 mL/Hr) IV Continuous <Continuous>  spironolactone 100 milliGRAM(s) Oral daily  tamsulosin 0.4 milliGRAM(s) Oral at bedtime  zinc oxide 40% Ointment 1 Application(s) Topical two times a day    MEDICATIONS  (PRN):  aluminum hydroxide/magnesium hydroxide/simethicone Suspension 30 milliLiter(s) Oral every 4 hours PRN Dyspepsia  bisacodyl Suppository 10 milliGRAM(s) Rectal daily PRN Constipation  glycopyrrolate Injectable 0.2 milliGRAM(s) IV Push every 6 hours PRN EXCESSIVE SECRETIONS  HYDROmorphone  Injectable 0.5 milliGRAM(s) IV Push every 4 hours PRN DYSPNEA/PAIN  lidocaine   Patch 1 Patch Transdermal daily PRN pain  LORazepam   Injectable 1 milliGRAM(s) IV Push every 6 hours PRN AGITATION/ANXIETY  polyethylene glycol 3350 17 Gram(s) Oral daily PRN Constipation      RADIOLOGY & ADDITIONAL TESTS:

## 2019-01-24 NOTE — PROGRESS NOTE ADULT - ASSESSMENT
84y/o  Female with h/o HTN, CKD stage 3, aortic stenosis, chronic thrombocytopenia, CAD stent x 2, CHF, liver cirrhosis.  s/p Left humerus IMN on 1/18/19  Now has leukocytosis  Not respontive this AM      Change in mental status  Leukocytosis  Ascites   fecal retention in the rectum with evidence of stercoral proctitis  Left humerus fracture  s/p Left humerus IMN on 1/18/19  Metastatic disease      - will need brain imaging  - Recommend Neurology eval to work up change in mental status   - Blood cultures pending  - No fever  - Trend leukocytosis  - Plan to r/o SBP  - Can continue Ceftriaxone for now  - Follow up cultures  - Constipation per primary team  - Palliative care eval  - Poor prognosis       Will Follow

## 2019-01-24 NOTE — PROGRESS NOTE ADULT - PROBLEM SELECTOR PLAN 3
Assist in all activities  Keep a well lit, safe, familiar environment.  Comfort feeds, oral care daily

## 2019-01-24 NOTE — PROGRESS NOTE ADULT - SUBJECTIVE AND OBJECTIVE BOX
[facility]  INFECTIOUS DISEASES AND INTERNAL MEDICINE at [place]  =======================================================  Yannick Landers MD  Diplomates American Board of Internal Medicine and Infectious Diseases  =======================================================    TONY FORTE [id number]    Follow up: Leukocytosis    Patient is not responsive this morning  No fever    Allergies:  No Known Allergies      Antibiotics:      cefTRIAXone   IVPB 1 Gram(s) IV Intermittent every 24 hours        REVIEW OF SYSTEMS:  Unable to obtain, Patient is not responsive       Physical Exam:  Vital Signs Last 24 Hrs  T(C): 36.6 (2019 07:40), Max: 36.6 (2019 07:40)  T(F): 97.8 (2019 07:40), Max: 97.8 (2019 07:40)  HR: 70 (2019 07:40) (70 - 87)  BP: 117/62 (2019 07:40) (108/62 - 124/64)  RR: 18 (2019 07:40) (18 - 20)  SpO2: 96% (2019 07:40) (96% - 96%)      GEN: not responsive  HEENT: normocephalic and atraumatic.   NECK: Supple.   LUNGS: decreased basal BS B/L  HEART: Regular rate and rhythm   ABDOMEN: Soft, nontender, and nondistended.  Positive bowel sounds.    : + Abbasi  EXTREMITIES: + edema.  MSK: No joint swelling  NEUROLOGIC: not responsive  PSYCHIATRIC: not responsive  SKIN: No Rash      Labs:      140  |  105  |  85.0<H>  ----------------------------<  135<H>  4.8   |  21.0<L>  |  1.82<H>    Ca    7.7<L>      2019 05:32  Phos  1.9       Mg     2.3         TPro  4.9<L>  /  Alb  2.6<L>  /  TBili  2.8<H>  /  DBili  1.2<H>  /  AST  105<H>  /  ALT  14  /  AlkPhos  248<H>                            10.4   24.5  )-----------( 55       ( 2019 05:32 )             30.6       PT/INR - ( 2019 05:32 )   PT: 27.6 sec;   INR: 2.34 ratio        Urinalysis Basic - ( 2019 14:30 )    Color: Yellow / Appearance: Clear / S.015 / pH: x  Gluc: x / Ketone: Trace  / Bili: Small / Urobili: 1 mg/dL   Blood: x / Protein: 30 mg/dL / Nitrite: Negative   Leuk Esterase: Small / RBC: 3-5 /HPF / WBC 3-5   Sq Epi: x / Non Sq Epi: Occasional / Bacteria: Moderate      LIVER FUNCTIONS - ( 2019 05:32 )  Alb: 2.6 g/dL / Pro: 4.9 g/dL / ALK PHOS: 248 U/L / ALT: 14 U/L / AST: 105 U/L / GGT: x               EXAM:  CT ABDOMEN AND PELVIS                        PROCEDURE DATE:  2019    INTERPRETATION:  CT ABDOMEN AND PELVIS  CLINICAL INFORMATION: Metastatic breast cancer with generalized abdominal   pain, leukocytosis, sepsis  COMPARISON: CT chest/abdomen/pelvis 1/15/2019  PROCEDURE:   CT of the Abdomen and Pelvis was performed without intravenous contrast.   Intravenous contrast: None.  Oral contrast: None.  Sagittal and coronal reformats were performed.  FINDINGS:  LOWER CHEST: Multiple lung nodules and right breast mass again noted.  LIVER: Morphologic changes of cirrhosis.  BILE DUCTS: Stable caliber.  GALLBLADDER: Prior cholecystectomy.  SPLEEN: Upper limits of normal for size.  PANCREAS: Normal.  ADRENALS: Mild bilateral thickening.  KIDNEYS/URETERS: No hydronephrosis or urinary tract calculi. Stable   perinephric nodularity.  BLADDER: Collapsed around a Abbasi catheter balloon.  REPRODUCTIVE ORGANS: No masses.  BOWEL: No bowel-related abnormality. No bowel obstruction. Moderate fecal   retention in the rectum with evidence of stercoral proctitis.  PERITONEUM: Increasing moderate ascites. No free air or focal collection.  VESSELS:  Aortoiliac atherosclerosis without aneurysm.  RETROPERITONEUM: No adenopathy.    ABDOMINAL WALL: Diffuse body wall edema.  BONES: Large lytic lesion in the right iliac bone with small extraosseous   soft tissue component. Stable pathologic fractures of T8 and L4. Lytic   lesion at L3.  IMPRESSION:   Moderate fecal retention in the rectum with evidence of stercoral proctitis.  Increasing moderate ascites and diffuse body wall edema, likely related   to cirrhosis and portal hypertension.  Right breast mass again seen with metastatic disease to the lungs and   bone as above.

## 2019-01-24 NOTE — CHART NOTE - NSCHARTNOTEFT_GEN_A_CORE
Nutrition:   Chart reviewed, contents noted. Pt now unresponsive and lethargic. No diet ordered, and on comfort measures only. [facility] will likely [facility].   Will remain available for questions or concerns  Rec:  Continue to honor pt/ family wishes regarding plan

## 2019-01-24 NOTE — PROGRESS NOTE ADULT - SUBJECTIVE AND OBJECTIVE BOX
This is a Palliative care note   HPI:  History obtained from pt and her daughter/ son at bedside, briefly she is 83y old female with PMHx of HTN, CKD stage 3, aortic stenosis, chronic thrombocytopenia, CAD stent x 2, CHF, liver cirrhosis, recently diagnosed metastatic cancer likely breast primary but have undergone biopsy, who presents to [facility] ED with weakness for 1 day. She was discharged from [facility] yesterday where she was diagnosed with mid shaft humerus fracture, and general weakness - was placed in sling and discharged to f/u with orthopedist outpatient, her daughter / son while attempting to help the patient get out of the car, they heard a "crack" in her arm with persistent LUE pain and generalized weakness/ malaise hence they activated EMS and Pt was taken by ambulance to [facility] ED. Pt reports left shoulder pain, continuos, 6/10 in severity, with limited ROM. She offers no other complaints but she is very poor historian. No reported fever, chills, nausea, vomiting, headache, dizziness, chest pain, SOB, palpitation. (15 Ronni 2019 09:13)<end of copied text>    CC: "heard a crack"     Present Symptoms:   Dyspnea: 1  Nausea/Vomiting: Unable to obtain due to poor mentation  Anxiety:  Unable to obtain due to poor mentation  Depression: Unable to obtain due to poor mentation  Fatigue: Yes   Loss of appetite:  Unable to obtain due to poor mentation    Pain: Patient appears in pain - started on comfort measures            Character-            Duration-            Effect-            Factors-            Frequency-            Location-            Severity-    Review of Systems: Reviewed                      Unable to obtain due to poor mentation    MEDICATIONS  (STANDING):  scopolamine   Patch 1 Patch Transdermal every 72 hours  zinc oxide 40% Ointment 1 Application(s) Topical two times a day    MEDICATIONS  (PRN):  glycopyrrolate Injectable 0.2 milliGRAM(s) IV Push every 6 hours PRN EXCESSIVE SECRETIONS  HYDROmorphone  Injectable 0.5 milliGRAM(s) IV Push every 4 hours PRN DYSPNEA/PAIN  lidocaine   Patch 1 Patch Transdermal daily PRN pain  LORazepam   Injectable 1 milliGRAM(s) IV Push every 6 hours PRN AGITATION/ANXIETY    PHYSICAL EXAM:  Vital Signs Last 24 Hrs  T(C): 36.6 (2019 07:40), Max: 36.6 (2019 07:40)  T(F): 97.8 (2019 07:40), Max: 97.8 (2019 07:40)  HR: 70 (2019 07:40) (70 - 87)  BP: 117/62 (2019 07:40) (108/62 - 124/64)  BP(mean): --  RR: 18 (2019 07:40) (18 - 20)  SpO2: 96% (2019 07:40) (96% - 96%)    General: lethargic  coma    HEENT: dry mouth     Lungs: dyspnea    CV: normal      GI:  stool incontinence     :   chawla, oliguria, hematuria    MSK: bedbound    Skin: thin frail skin    LABS:                          10.4   24.5  )-----------( 55       ( 2019 05:32 )             30.6         140  |  105  |  85.0<H>  ----------------------------<  135<H>  4.8   |  21.0<L>  |  1.82<H>    Ca    7.7<L>      2019 05:32  Phos  1.9       Mg     2.3         TPro  4.9<L>  /  Alb  2.6<L>  /  TBili  2.8<H>  /  DBili  1.2<H>  /  AST  105<H>  /  ALT  14  /  AlkPhos  248<H>      PT/INR - ( 2019 05:32 )   PT: 27.6 sec;   INR: 2.34 ratio           Urinalysis Basic - ( 2019 14:30 )    Color: Yellow / Appearance: Clear / S.015 / pH: x  Gluc: x / Ketone: Trace  / Bili: Small / Urobili: 1 mg/dL   Blood: x / Protein: 30 mg/dL / Nitrite: Negative   Leuk Esterase: Small / RBC: 3-5 /HPF / WBC 3-5   Sq Epi: x / Non Sq Epi: Occasional / Bacteria: Moderate      I&O's Summary    2019 07:01  -  2019 07:00  --------------------------------------------------------  IN: 0 mL / OUT: 600 mL / NET: -600 mL        RADIOLOGY & ADDITIONAL STUDIES:  CXR 19  Impression:  Diffuse left lung infiltrate, new since 2019..    CT head   IMPRESSION:   Mild periventricular white matter ischemia.             ECHO 03.15.18    Summary:   1. Severely enlarged left atrium.   2. Segmental wall motion abnormalities in distal LAD territory.   3. Left ventricular ejection fraction, by visual estimation, is 45 to   50%.   4. The right atrium is normal in size.   5. Normal right ventricular size and function.   6. Moderate tricuspid regurgitation.   7. Estimated pulmonary artery systolic pressure is 42.0 mmHg -h mild   pulmonary hypertension.   8. Trivial pericardial effusion.    ADVANCE DIRECTIVES:   DNR DNI MOLST - REDONE TODAY 19

## 2019-01-25 PROCEDURE — 99233 SBSQ HOSP IP/OBS HIGH 50: CPT

## 2019-01-25 PROCEDURE — 99232 SBSQ HOSP IP/OBS MODERATE 35: CPT

## 2019-01-25 RX ADMIN — SCOPALAMINE 1 PATCH: 1 PATCH, EXTENDED RELEASE TRANSDERMAL at 08:21

## 2019-01-25 RX ADMIN — ZINC OXIDE 1 APPLICATION(S): 200 OINTMENT TOPICAL at 23:20

## 2019-01-25 RX ADMIN — SCOPALAMINE 1 PATCH: 1 PATCH, EXTENDED RELEASE TRANSDERMAL at 20:19

## 2019-01-25 RX ADMIN — ZINC OXIDE 1 APPLICATION(S): 200 OINTMENT TOPICAL at 06:52

## 2019-01-25 NOTE — PROGRESS NOTE ADULT - PROBLEM SELECTOR PLAN 1
Follows up outpatient with [facility] [place]. [place]   NOW ON COMFORT MEASURES/ HOSPICE

## 2019-01-25 NOTE — PROGRESS NOTE ADULT - ASSESSMENT
This is an 83 year old female PMHx metastatic breast CA (treated outpatient at [facility]) CKD, aortic stenosis, CAD with stent x 2, CHF, liver cirrhosis, abdmitted with weakness. Found to have fracture of left humerous - s/p IMN POD # 6 now  UNRESPONSIVE LAST NIGHT AS OVERNIGHT EVENT  NOW ON COMFORT MEASURES POST FAMILY MEETING

## 2019-01-25 NOTE — PROGRESS NOTE ADULT - PROBLEM SELECTOR PLAN 3
Assist in all activities  Keep a well lit, safe, familiar environment.  Comfort feeds, oral care daily.

## 2019-01-25 NOTE — PROGRESS NOTE ADULT - SUBJECTIVE AND OBJECTIVE BOX
CC: Humerus fracture/ Metastatic breast cancer/cirrhosis    INTERVAL HPI/OVERNIGHT EVENTS: Patient seen and examined. Awake and alert today. No appetite. No chest pain, SOB, dizziness, nausea, vomiting, fever, chills.     Vital Signs Last 24 Hrs  T(C): 36.7 (2019 08:33), Max: 36.9 (2019 01:19)  T(F): 98 (2019 08:33), Max: 98.5 (2019 01:19)  HR: 84 (2019 08:33) (68 - 84)  BP: 147/72 (2019 08:33) (120/66 - 147/72)  BP(mean): --  RR: 16 (2019 08:33) (16 - 18)  SpO2: 98% (2019 15:51) (98% - 98%)      GENERAL: Not in distress. alert and conversant  HEENT:  Normocephalic and atraumatic. PEARLA, EOMI  NECK: Supple.  No JVD.    CARDIOVASCULAR: RRR S1, S2. No murmur/rubs/gallop  LUNGS: Unlabored, without wheeze  ABDOMEN: ND. Soft, BS normoactive  NEUROLOGIC: A+Ox3, non focal      I&O's Detail    2019 07:01  -  2019 07:00  --------------------------------------------------------  IN:  Total IN: 0 mL    OUT:    Indwelling Catheter - Urethral: 650 mL  Total OUT: 650 mL    Total NET: -650 mL                                    10.4   24.5  )-----------( 55       ( 2019 05:32 )             30.6     2019 05:32    140    |  105    |  85.0   ----------------------------<  135    4.8     |  21.0   |  1.82     Ca    7.7        2019 05:32  Phos  1.9       2019 05:32  Mg     2.3       2019 05:32    TPro  4.9    /  Alb  2.6    /  TBili  2.8    /  DBili  1.2    /  AST  105    /  ALT  14     /  AlkPhos  248    2019 05:32    PT/INR - ( 2019 05:32 )   PT: 27.6 sec;   INR: 2.34 ratio           CAPILLARY BLOOD GLUCOSE        LIVER FUNCTIONS - ( 2019 05:32 )  Alb: 2.6 g/dL / Pro: 4.9 g/dL / ALK PHOS: 248 U/L / ALT: 14 U/L / AST: 105 U/L / GGT: x           Urinalysis Basic - ( 2019 14:30 )    Color: Yellow / Appearance: Clear / S.015 / pH: x  Gluc: x / Ketone: Trace  / Bili: Small / Urobili: 1 mg/dL   Blood: x / Protein: 30 mg/dL / Nitrite: Negative   Leuk Esterase: Small / RBC: 3-5 /HPF / WBC 3-5   Sq Epi: x / Non Sq Epi: Occasional / Bacteria: Moderate        MEDICATIONS  (STANDING):  scopolamine   Patch 1 Patch Transdermal every 72 hours  zinc oxide 40% Ointment 1 Application(s) Topical two times a day    MEDICATIONS  (PRN):  glycopyrrolate Injectable 0.2 milliGRAM(s) IV Push every 6 hours PRN EXCESSIVE SECRETIONS  HYDROmorphone  Injectable 0.5 milliGRAM(s) IV Push every 4 hours PRN DYSPNEA/PAIN  lidocaine   Patch 1 Patch Transdermal daily PRN pain  LORazepam   Injectable 1 milliGRAM(s) IV Push every 6 hours PRN AGITATION/ANXIETY      RADIOLOGY & ADDITIONAL TESTS:

## 2019-01-25 NOTE — PROGRESS NOTE ADULT - ASSESSMENT
83y old female with PMHx of HTN, CKD stage 3, aortic stenosis, chronic thrombocytopenia, CAD stent x 2, CHF, liver cirrhosis, recently diagnosed metastatic cancer likely breast primary but not  undergone biopsy, who presented to [facility] ED with weakness. She was discharged from [facility] one day prior where she was diagnosed with mid shaft humerus fracture, and general weakness - was placed in sling and discharged to f/u with orthopedist.  Outpatient, her daughter / son while attempting to help the patient get out of the car, they heard a "crack" in her arm with persistent LUE pain and generalized weakness/ malaise hence they activated EMS and Pt was taken by ambulance to [facility] ED. Admitted for left humerus fracture and general weakness/ malaise 2/2 metastatic malignancy. Now with leukocytosis and abdominal pain and minimal responsiveness yesterday, family agreeing to withdrawal of care, Hospice. Patient now awake and in agreement with plan.     >AMS:  Pt  lethargic yesterday- possibly due  hepatic encephalopathy,  family has  chosen not to pursue further treatments and testing and would like comfort care/Hospice. Prognosis is grave with hx of cirrhosis, ascites and metastatic breast cancer. Patient now awake and in agreement with no further treatment/testing/intervention.    >Leukocytosis: Worsened, was started  on IV Rocephin for suspected SBP. GI recommend IR tap to R/O SBP however INR elevated. Would need reversal, however family now opting for comfort care, no further invasive testing/treatments and procedures. Met with Palliative care, Hospice following, as patient is more awake [facility] not [facility], will evaluate in 24 hours, if continues to do well, possibility of NINA with Hospice in future if patient  declines.   >CKD lll- worsening renal function, currently on IV fluids  >Pathological Left Humeral fracture:  s/p IMN POD#7   >Metabolic Encephalopathy - Comfort Care/Hospice  >Metastatic malignancy likely breast being primary.- Patient on comfort care/Hospice eval    > GOC: palliative on board. DNR/DNI. Hospice will re evaluate in 24 hours. If patient continues to improve will explore NINA and re evaluate Hospice after discharge.

## 2019-01-25 NOTE — PROGRESS NOTE ADULT - ATTENDING COMMENTS
seen and examined at bedside. more alert and awake. AAO*2. wanted to c/w comfort care. not a candidate for inpatient hospice. possible NINA then home with home hospice. started on dilaudid for pain control/comfort. hospice will reassess.   form signed for son Johnny to get his plain fare reimbursed.

## 2019-01-25 NOTE — PROGRESS NOTE ADULT - SUBJECTIVE AND OBJECTIVE BOX
This is a Palliative care followup  HPI:  History obtained from pt and her daughter/ son at bedside, briefly she is 83y old female with PMHx of HTN, CKD stage 3, aortic stenosis, chronic thrombocytopenia, CAD stent x 2, CHF, liver cirrhosis, recently diagnosed metastatic cancer likely breast primary but have undergone biopsy, who presents to [facility] ED with weakness for 1 day. She was discharged from [facility] yesterday where she was diagnosed with mid shaft humerus fracture, and general weakness - was placed in sling and discharged to f/u with orthopedist outpatient, her daughter / son while attempting to help the patient get out of the car, they heard a "crack" in her arm with persistent LUE pain and generalized weakness/ malaise hence they activated EMS and Pt was taken by ambulance to [facility] ED. Pt reports left shoulder pain, continuos, 6/10 in severity, with limited ROM. She offers no other complaints but she is very poor historian. No reported fever, chills, nausea, vomiting, headache, dizziness, chest pain, SOB, palpitation. (15 Ronni 2019 09:13)<end of copied text>    CC: "heard a crack"     Present Symptoms:   Dyspnea: 1  Nausea/Vomiting: Unable to obtain due to poor mentation  Anxiety:  Unable to obtain due to poor mentation  Depression: Unable to obtain due to poor mentation  Fatigue: Yes   Loss of appetite:  Unable to obtain due to poor mentation    Pain: Patient appears in pain - started on comfort measures            Character-            Duration-            Effect-            Factors-            Frequency-            Location-            Severity-    Review of Systems: Reviewed                      Unable to obtain due to poor mentation    MEDICATIONS  (STANDING):  scopolamine   Patch 1 Patch Transdermal every 72 hours  zinc oxide 40% Ointment 1 Application(s) Topical two times a day    MEDICATIONS  (PRN):  glycopyrrolate Injectable 0.2 milliGRAM(s) IV Push every 6 hours PRN EXCESSIVE SECRETIONS  HYDROmorphone  Injectable 0.5 milliGRAM(s) IV Push every 4 hours PRN DYSPNEA/PAIN  lidocaine   Patch 1 Patch Transdermal daily PRN pain  LORazepam   Injectable 1 milliGRAM(s) IV Push every 6 hours PRN AGITATION/ANXIETY      PHYSICAL EXAM:    Vital Signs Last 24 Hrs  T(C): 36.7 (2019 08:33), Max: 36.9 (2019 01:19)  T(F): 98 (2019 08:33), Max: 98.5 (2019 01:19)  HR: 84 (2019 08:33) (68 - 84)  BP: 147/72 (2019 08:33) (120/66 - 147/72)  BP(mean): --  RR: 16 (2019 08:33) (16 - 18)  SpO2: 98% (2019 15:51) (98% - 98%)    General: lethargic  coma    HEENT: dry mouth     Lungs: dyspnea    CV: normal      GI:  stool incontinence     :   chawla, oliguria, hematuria    MSK: bedbound    Skin: thin frail skin    LABS:                          10.4   24.5  )-----------( 55       ( 2019 05:32 )             30.6         140  |  105  |  85.0<H>  ----------------------------<  135<H>  4.8   |  21.0<L>  |  1.82<H>    Ca    7.7<L>      2019 05:32  Phos  1.9       Mg     2.3         TPro  4.9<L>  /  Alb  2.6<L>  /  TBili  2.8<H>  /  DBili  1.2<H>  /  AST  105<H>  /  ALT  14  /  AlkPhos  248<H>    PT/INR - ( 2019 05:32 )   PT: 27.6 sec;   INR: 2.34 ratio         Urinalysis Basic - ( 2019 14:30 )  Color: Yellow / Appearance: Clear / S.015 / pH: x  Gluc: x / Ketone: Trace  / Bili: Small / Urobili: 1 mg/dL   Blood: x / Protein: 30 mg/dL / Nitrite: Negative   Leuk Esterase: Small / RBC: 3-5 /HPF / WBC 3-5   Sq Epi: x / Non Sq Epi: Occasional / Bacteria: Moderate      I&O's Summary  2019 07:01  -  2019 07:00  --------------------------------------------------------  IN: 0 mL / OUT: 650 mL / NET: -650 mL        RADIOLOGY & ADDITIONAL STUDIES:  CT abd 19  IMPRESSION:   Moderate fecal retention in the rectum with evidence of stercoral   proctitis.  Increasing moderate ascites and diffuse body wall edema, likely related   to cirrhosis and portal hypertension.  Right breast mass again seen with metastatic disease to the lungs and   bone as above.    CXR 19  Impression:  Diffuse left lung infiltrate, new since 2019..    CT head   IMPRESSION:   Mild periventricular white matter ischemia.             ECHO 03.15.18    Summary:   1. Severely enlarged left atrium.   2. Segmental wall motion abnormalities in distal LAD territory.   3. Left ventricular ejection fraction, by visual estimation, is 45 to   50%.   4. The right atrium is normal in size.   5. Normal right ventricular size and function.   6. Moderate tricuspid regurgitation.   7. Estimated pulmonary artery systolic pressure is 42.0 mmHg -h mild   pulmonary hypertension.   8. Trivial pericardial effusion.    ADVANCE DIRECTIVES:   DNR DNI CRISTINA

## 2019-01-25 NOTE — PROGRESS NOTE ADULT - PROBLEM SELECTOR PLAN 4
-Met with patient at bedside today along with hospice liaison and family  -Patient is awake and alert today but not oriented to surroundings  -Patient appears comfortable and in no distress - also denies discomforts  -Hospice came today to get consents for the [facility] to [facility] - at this time we are waiting for the daughter Kyleigh to come back to the hospital so signatures can be provided  - Will continue at this time with comfort measures - patient is DNR DNI MOLST   - At this time will continue to support and monitor    Total time spent 20 minutes    Thank you for the opportunity to assist with the care of this patient.   [facility] [phone].

## 2019-01-26 PROCEDURE — 99233 SBSQ HOSP IP/OBS HIGH 50: CPT

## 2019-01-26 RX ORDER — SODIUM CHLORIDE 9 MG/ML
1000 INJECTION INTRAMUSCULAR; INTRAVENOUS; SUBCUTANEOUS
Qty: 0 | Refills: 0 | Status: DISCONTINUED | OUTPATIENT
Start: 2019-01-26 | End: 2019-01-28

## 2019-01-26 RX ORDER — CEFTRIAXONE 500 MG/1
1 INJECTION, POWDER, FOR SOLUTION INTRAMUSCULAR; INTRAVENOUS ONCE
Qty: 0 | Refills: 0 | Status: COMPLETED | OUTPATIENT
Start: 2019-01-26 | End: 2019-01-26

## 2019-01-26 RX ORDER — SPIRONOLACTONE 25 MG/1
100 TABLET, FILM COATED ORAL DAILY
Qty: 0 | Refills: 0 | Status: DISCONTINUED | OUTPATIENT
Start: 2019-01-26 | End: 2019-01-28

## 2019-01-26 RX ORDER — ATORVASTATIN CALCIUM 80 MG/1
20 TABLET, FILM COATED ORAL AT BEDTIME
Qty: 0 | Refills: 0 | Status: DISCONTINUED | OUTPATIENT
Start: 2019-01-26 | End: 2019-01-28

## 2019-01-26 RX ORDER — CLOPIDOGREL BISULFATE 75 MG/1
75 TABLET, FILM COATED ORAL DAILY
Qty: 0 | Refills: 0 | Status: DISCONTINUED | OUTPATIENT
Start: 2019-01-26 | End: 2019-01-28

## 2019-01-26 RX ORDER — CEFTRIAXONE 500 MG/1
1 INJECTION, POWDER, FOR SOLUTION INTRAMUSCULAR; INTRAVENOUS EVERY 24 HOURS
Qty: 0 | Refills: 0 | Status: DISCONTINUED | OUTPATIENT
Start: 2019-01-27 | End: 2019-01-28

## 2019-01-26 RX ORDER — CEFTRIAXONE 500 MG/1
INJECTION, POWDER, FOR SOLUTION INTRAMUSCULAR; INTRAVENOUS
Qty: 0 | Refills: 0 | Status: DISCONTINUED | OUTPATIENT
Start: 2019-01-26 | End: 2019-01-28

## 2019-01-26 RX ORDER — METOPROLOL TARTRATE 50 MG
25 TABLET ORAL DAILY
Qty: 0 | Refills: 0 | Status: DISCONTINUED | OUTPATIENT
Start: 2019-01-26 | End: 2019-01-28

## 2019-01-26 RX ADMIN — ZINC OXIDE 1 APPLICATION(S): 200 OINTMENT TOPICAL at 16:55

## 2019-01-26 RX ADMIN — SCOPALAMINE 1 PATCH: 1 PATCH, EXTENDED RELEASE TRANSDERMAL at 20:00

## 2019-01-26 RX ADMIN — Medication 25 MILLIGRAM(S): at 16:00

## 2019-01-26 RX ADMIN — ZINC OXIDE 1 APPLICATION(S): 200 OINTMENT TOPICAL at 05:54

## 2019-01-26 RX ADMIN — CEFTRIAXONE 100 GRAM(S): 500 INJECTION, POWDER, FOR SOLUTION INTRAMUSCULAR; INTRAVENOUS at 16:55

## 2019-01-26 RX ADMIN — SODIUM CHLORIDE 50 MILLILITER(S): 9 INJECTION INTRAMUSCULAR; INTRAVENOUS; SUBCUTANEOUS at 16:00

## 2019-01-26 RX ADMIN — CLOPIDOGREL BISULFATE 75 MILLIGRAM(S): 75 TABLET, FILM COATED ORAL at 16:00

## 2019-01-26 RX ADMIN — ATORVASTATIN CALCIUM 20 MILLIGRAM(S): 80 TABLET, FILM COATED ORAL at 16:00

## 2019-01-26 RX ADMIN — SPIRONOLACTONE 100 MILLIGRAM(S): 25 TABLET, FILM COATED ORAL at 16:00

## 2019-01-26 RX ADMIN — SCOPALAMINE 1 PATCH: 1 PATCH, EXTENDED RELEASE TRANSDERMAL at 07:00

## 2019-01-26 NOTE — PROGRESS NOTE ADULT - ASSESSMENT
83y old female with PMHx of HTN, CKD stage 3, aortic stenosis, chronic thrombocytopenia, CAD stent x 2, CHF, liver cirrhosis, recently diagnosed metastatic cancer likely breast primary but not  undergone biopsy, who presented to [facility] ED with weakness. She was discharged from [facility] one day prior where she was diagnosed with mid shaft humerus fracture, and general weakness - was placed in sling and discharged to f/u with orthopedist.  Outpatient, her daughter / son while attempting to help the patient get out of the car, they heard a "crack" in her arm with persistent LUE pain and generalized weakness/ malaise hence they activated EMS and Pt was taken by ambulance to [facility] ED. Admitted for left humerus fracture and general weakness/ malaise 2/2 metastatic malignancy. Now with leukocytosis and abdominal pain and minimal responsiveness yesterday, family agreeing to withdrawal of care, Hospice. Patient now awake and in agreement with plan.     >AMS:  Pt  lethargic yesterday- possibly due  hepatic encephalopathy,  family had  chosen not to pursue further treatments and testing and would like comfort care/Hospice. But pt's lethargy improved & family wants to continue with basic meds labs, vitals, imaging etc. Agree with holding with PO chemo. Wants more input from pt's oncologist Dr. Presley. Called placed to Dr. Presley [phone]. Await call back.  Prognosis is grave with hx of cirrhosis, ascites and metastatic breast cancer.    >Leukocytosis: Worsened, was started  on IV Rocephin for suspected SBP. GI recommend IR tap to R/O SBP however INR elevated. Would need reversal, however family was opting for comfort care, now resciding it as pt is more awake. Will resume rocephin for now. Family yet contemplating about comfort measures but wants to take it off for now.   she is awake and alert so does not meet inpt hospice criteria at this present time as she is taking po, and they verbalized understanding. Family thinking about NINA placement, as they are concerned about lack of assistance at home and available aide hrs are not enough for them. The stated that they cannot private hire.     > CAD stent x 2- cardio meds resumed  >CKD lll- worsening renal function, currently on IV fluids  >Pathological Left Humeral fracture:  s/p IMN POD#8   >Metabolic Encephalopathy - as above  >Metastatic malignancy likely breast being primary.- as above    > GOC: palliative on board. DNR/DNI. Not Hospice appropriate. Disccussed with family in details about guarded prognosis long term despite improvement in mental status currently. Daughter will discuss with rest of family members before making any decisions. Comfort care resinded for now.

## 2019-01-26 NOTE — PROGRESS NOTE ADULT - SUBJECTIVE AND OBJECTIVE BOX
CHIEF COMPLAINT/INTERVAL HISTORY:    Patient is a 83y old  Female who presents with a chief complaint of Humerus fracture/ weakness. (25 Jan 2019 13:57)      HPI:  History obtained from pt and her daughter/ son at bedside, briefly she is 83y old female with PMHx of HTN, CKD stage 3, aortic stenosis, chronic thrombocytopenia, CAD stent x 2, CHF, liver cirrhosis, recently diagnosed metastatic cancer likely breast primary but have undergone biopsy, who presents to [facility] ED with weakness for 1 day. She was discharged from [facility] yesterday where she was diagnosed with mid shaft humerus fracture, and general weakness - was placed in sling and discharged to f/u with orthopedist outpatient, her daughter / son while attempting to help the patient get out of the car, they heard a "crack" in her arm with persistent LUE pain and generalized weakness/ malaise hence they activated EMS and Pt was taken by ambulance to [facility] ED. Pt reports left shoulder pain, continuos, 6/10 in severity, with limited ROM. She offers no other complaints but she is very poor historian. No reported fever, chills, nausea, vomiting, headache, dizziness, chest pain, SOB, palpitation. (15 Ronni 2019 09:13)      SUBJECTIVE & OBJECTIVE/ ROS: Pt seen and examined at bedside. Family at bedside. Pt denies chest pain, palpitations, sob, light headedness/dizziness, difficulty breathing/cough, fevers/chills, abdominal pain, n/v    ICU Vital Signs Last 24 Hrs  T(C): 36.3 (26 Jan 2019 08:35), Max: 36.3 (25 Jan 2019 16:27)  T(F): 97.4 (26 Jan 2019 08:35), Max: 97.4 (25 Jan 2019 16:27)  HR: 67 (26 Jan 2019 08:35) (67 - 83)  BP: 122/71 (26 Jan 2019 08:35) (122/71 - 130/75)  BP(mean): --  ABP: --  ABP(mean): --  RR: 18 (26 Jan 2019 08:35) (18 - 18)  SpO2: 93% (25 Jan 2019 16:27) (93% - 93%)        MEDICATIONS  (STANDING):  atorvastatin 20 milliGRAM(s) Oral at bedtime  clopidogrel Tablet 75 milliGRAM(s) Oral daily  metoprolol succinate ER 25 milliGRAM(s) Oral daily  scopolamine   Patch 1 Patch Transdermal every 72 hours  spironolactone 100 milliGRAM(s) Oral daily  zinc oxide 40% Ointment 1 Application(s) Topical two times a day    MEDICATIONS  (PRN):  glycopyrrolate Injectable 0.2 milliGRAM(s) IV Push every 6 hours PRN EXCESSIVE SECRETIONS  HYDROmorphone  Injectable 0.5 milliGRAM(s) IV Push every 4 hours PRN DYSPNEA/PAIN  lidocaine   Patch 1 Patch Transdermal daily PRN pain  LORazepam   Injectable 1 milliGRAM(s) IV Push every 6 hours PRN AGITATION/ANXIETY      LABS:                CAPILLARY BLOOD GLUCOSE          RECENT CULTURES:      RADIOLOGY & ADDITIONAL TESTS:      PHYSICAL EXAM:    GENERAL: Not in distress. alert and conversant  HEENT:  Normocephalic and atraumatic. PEARLA, EOMI  NECK: Supple.  No JVD.    CARDIOVASCULAR: RRR S1, S2. No murmur/rubs/gallop  LUNGS: Unlabored, without wheeze  ABDOMEN: ND. Soft, BS normoactive  NEUROLOGIC: A+Ox3, non focal

## 2019-01-27 PROCEDURE — 99232 SBSQ HOSP IP/OBS MODERATE 35: CPT

## 2019-01-27 RX ORDER — LACTULOSE 10 G/15ML
200 SOLUTION ORAL ONCE
Qty: 0 | Refills: 0 | Status: COMPLETED | OUTPATIENT
Start: 2019-01-27 | End: 2019-01-27

## 2019-01-27 RX ORDER — LACTULOSE 10 G/15ML
20 SOLUTION ORAL THREE TIMES A DAY
Qty: 0 | Refills: 0 | Status: DISCONTINUED | OUTPATIENT
Start: 2019-01-28 | End: 2019-01-28

## 2019-01-27 RX ADMIN — HYDROMORPHONE HYDROCHLORIDE 0.5 MILLIGRAM(S): 2 INJECTION INTRAMUSCULAR; INTRAVENOUS; SUBCUTANEOUS at 08:05

## 2019-01-27 RX ADMIN — SPIRONOLACTONE 100 MILLIGRAM(S): 25 TABLET, FILM COATED ORAL at 06:46

## 2019-01-27 RX ADMIN — CLOPIDOGREL BISULFATE 75 MILLIGRAM(S): 75 TABLET, FILM COATED ORAL at 16:57

## 2019-01-27 RX ADMIN — SCOPALAMINE 1 PATCH: 1 PATCH, EXTENDED RELEASE TRANSDERMAL at 16:59

## 2019-01-27 RX ADMIN — ZINC OXIDE 1 APPLICATION(S): 200 OINTMENT TOPICAL at 06:44

## 2019-01-27 RX ADMIN — ATORVASTATIN CALCIUM 20 MILLIGRAM(S): 80 TABLET, FILM COATED ORAL at 22:22

## 2019-01-27 RX ADMIN — ZINC OXIDE 1 APPLICATION(S): 200 OINTMENT TOPICAL at 17:06

## 2019-01-27 RX ADMIN — SCOPALAMINE 1 PATCH: 1 PATCH, EXTENDED RELEASE TRANSDERMAL at 06:47

## 2019-01-27 RX ADMIN — CEFTRIAXONE 100 GRAM(S): 500 INJECTION, POWDER, FOR SOLUTION INTRAMUSCULAR; INTRAVENOUS at 16:58

## 2019-01-27 RX ADMIN — SCOPALAMINE 1 PATCH: 1 PATCH, EXTENDED RELEASE TRANSDERMAL at 16:56

## 2019-01-27 RX ADMIN — Medication 25 MILLIGRAM(S): at 16:57

## 2019-01-27 RX ADMIN — SCOPALAMINE 1 PATCH: 1 PATCH, EXTENDED RELEASE TRANSDERMAL at 20:12

## 2019-01-27 RX ADMIN — LACTULOSE 200 GRAM(S): 10 SOLUTION ORAL at 16:57

## 2019-01-27 NOTE — PROGRESS NOTE ADULT - ASSESSMENT
83y old female with PMHx of HTN, CKD stage 3, aortic stenosis, chronic thrombocytopenia, CAD stent x 2, CHF, liver cirrhosis, recently diagnosed metastatic cancer likely breast primary but not  undergone biopsy, who presented to [facility] ED with weakness. She was discharged from [facility] one day prior where she was diagnosed with mid shaft humerus fracture, and general weakness - was placed in sling and discharged to f/u with orthopedist.  Outpatient, her daughter / son while attempting to help the patient get out of the car, they heard a "crack" in her arm with persistent LUE pain and generalized weakness/ malaise hence they activated EMS and Pt was taken by ambulance to [facility] ED. Admitted for left humerus fracture and general weakness/ malaise 2/2 metastatic malignancy. Now with leukocytosis and abdominal pain and minimal responsiveness yesterday, family agreeing to withdrawal of care, Hospice. Patient now awake and in agreement with plan.     >AMS:  Pt  lethargic yesterday- possibly due hepatic encephalopathy,  family had  chosen not to pursue further treatments and testing and would like comfort care/Hospice. But pt's lethargy improved & family wants to continue with basic meds labs, vitals, imaging etc. Agree with holding with PO chemo. Wants more input from pt's oncologist Dr. Presley. Called placed to Dr. Presley [phone]. Await call back.  Prognosis is grave with hx of cirrhosis, ascites and metastatic breast cancer.    >Leukocytosis: Worsened, was started  on IV Rocephin for suspected SBP. GI recommend IR tap to R/O SBP however INR elevated. Would need reversal, however family was opting for comfort care, now resciding it as pt is more awake. Will resume rocephin for now. Family yet contemplating about comfort measures but wants to take it off for now.   she is awake and alert so does not meet inpt hospice criteria at this present time as she is taking po, and they verbalized understanding. Family thinking about NINA placement, as they are concerned about lack of assistance at home and available aide hrs are not enough for them. The stated that they cannot private hire.     > CAD stent x 2- cardio meds resumed  >CKD lll- worsening renal function, currently on IV fluids  >Pathological Left Humeral fracture:  s/p IMN POD#8   >Metabolic Encephalopathy - as above  >Metastatic malignancy likely breast being primary.- as above    > GOC: palliative on board. DNR/DNI. Not Hospice appropriate. Disccussed with family in details about guarded prognosis long term despite improvement in mental status currently. Daughter will discuss with rest of family members before making any decisions. Comfort care resinded for now. 83y old female with PMHx of HTN, CKD stage 3, aortic stenosis, chronic thrombocytopenia, CAD stent x 2, CHF, liver cirrhosis, recently diagnosed metastatic cancer likely breast primary but not  undergone biopsy, who presented to [facility] ED with weakness. She was discharged from [facility] one day prior where she was diagnosed with mid shaft humerus fracture, and general weakness - was placed in sling and discharged to f/u with orthopedist.  Outpatient, her daughter / son while attempting to help the patient get out of the car, they heard a "crack" in her arm with persistent LUE pain and generalized weakness/ malaise hence they activated EMS and Pt was taken by ambulance to [facility] ED. Admitted for left humerus fracture and general weakness/ malaise 2/2 metastatic malignancy. Now with leukocytosis and abdominal pain and minimal responsiveness yesterday, family agreeing to withdrawal of care, Hospice. Patient now awake and in agreement with plan.     >AMS: - possibly due hepatic encephalopathy,  family had opted not to pursue further treatments and testing and and opt for comfort care/Hospice. But pt's lethargy improved & family wants to continue with basic meds labs, vitals, imaging etc. Agree with holding with PO chemo. Wants more input from pt's oncologist Dr. Presley. Called placed to Dr. Presley [phone]. Await call back.  Prognosis is grave with hx of cirrhosis, ascites and metastatic breast cancer. IV rocephin started for suspected SBP. Paracentesis on hold until family decides if they want to go ahead with it. Await bx & PET scan results from Dr. Presley which will help them decide comfort care vs not     >Leukocytosis: Worsened, was started  on IV Rocephin for suspected SBP. GI recommend IR tap to R/O SBP however INR elevated. Would need reversal, however family was initially opting for comfort care, later resciding it as pt was more awake. Resumed rocephin IV. Family yet contemplating about comfort measures but wants to take it off for now.   she is awake and alert so does not meet inpt hospice criteria at this present time as she is taking po, and they verbalized understanding. Family thinking about NINA placement, as they are concerned about lack of assistance at home and available aide hrs are not enough for them. The stated that they cannot private hire.     > CAD stent x 2- cardio meds resumed  >CKD lll- worsening renal function, currently on IV fluids  >Pathological Left Humeral fracture:  s/p IMN POD#8   >Metabolic Encephalopathy - as above  >Metastatic malignancy likely breast being primary.- as above    > GOC: palliative on board. DNR/DNI. Will revisit hospice in am. Disccussed with family in details about guarded prognosis long term despite improvement in mental status currently.  Comfort care reinstated for now after discussing with family who want to continue cardiac meds & IV abx. Await call back from pt's oncologist

## 2019-01-27 NOTE — PROGRESS NOTE ADULT - SUBJECTIVE AND OBJECTIVE BOX
CHIEF COMPLAINT/INTERVAL HISTORY:    Patient is a 83y old  Female who presents with a chief complaint of Humerus fracture/ weakness. (25 Jan 2019 13:57)      HPI:  History obtained from pt and her daughter/ son at bedside, briefly she is 83y old female with PMHx of HTN, CKD stage 3, aortic stenosis, chronic thrombocytopenia, CAD stent x 2, CHF, liver cirrhosis, recently diagnosed metastatic cancer likely breast primary but have undergone biopsy, who presents to [facility] ED with weakness for 1 day. She was discharged from [facility] yesterday where she was diagnosed with mid shaft humerus fracture, and general weakness - was placed in sling and discharged to f/u with orthopedist outpatient, her daughter / son while attempting to help the patient get out of the car, they heard a "crack" in her arm with persistent LUE pain and generalized weakness/ malaise hence they activated EMS and Pt was taken by ambulance to [facility] ED. Pt reports left shoulder pain, continuos, 6/10 in severity, with limited ROM. She offers no other complaints but she is very poor historian. No reported fever, chills, nausea, vomiting, headache, dizziness, chest pain, SOB, palpitation. (15 Ronni 2019 09:13)      SUBJECTIVE & OBJECTIVE/ ROS: Pt seen and examined at bedside. Family at bedside. Pt only responding to noxious stimuli. No other overnight issues reported    ICU Vital Signs Last 24 Hrs  T(C): 36.3 (26 Jan 2019 08:35), Max: 36.3 (25 Jan 2019 16:27)  T(F): 97.4 (26 Jan 2019 08:35), Max: 97.4 (25 Jan 2019 16:27)  HR: 67 (26 Jan 2019 08:35) (67 - 83)  BP: 122/71 (26 Jan 2019 08:35) (122/71 - 130/75)  BP(mean): --  ABP: --  ABP(mean): --  RR: 18 (26 Jan 2019 08:35) (18 - 18)  SpO2: 93% (25 Jan 2019 16:27) (93% - 93%)        MEDICATIONS  (STANDING):  atorvastatin 20 milliGRAM(s) Oral at bedtime  clopidogrel Tablet 75 milliGRAM(s) Oral daily  metoprolol succinate ER 25 milliGRAM(s) Oral daily  scopolamine   Patch 1 Patch Transdermal every 72 hours  spironolactone 100 milliGRAM(s) Oral daily  zinc oxide 40% Ointment 1 Application(s) Topical two times a day    MEDICATIONS  (PRN):  glycopyrrolate Injectable 0.2 milliGRAM(s) IV Push every 6 hours PRN EXCESSIVE SECRETIONS  HYDROmorphone  Injectable 0.5 milliGRAM(s) IV Push every 4 hours PRN DYSPNEA/PAIN  lidocaine   Patch 1 Patch Transdermal daily PRN pain  LORazepam   Injectable 1 milliGRAM(s) IV Push every 6 hours PRN AGITATION/ANXIETY      LABS:                CAPILLARY BLOOD GLUCOSE          RECENT CULTURES:      RADIOLOGY & ADDITIONAL TESTS:      PHYSICAL EXAM:    GENERAL: only responding to noxious stimuli  HEENT:  Normocephalic and atraumatic. PEARLA, EOMI  NECK: Supple.  No JVD.    CARDIOVASCULAR: RRR S1, S2. No murmur/rubs/gallop  LUNGS: Unlabored, without wheeze  ABDOMEN: ND. Soft, BS normoactive  NEUROLOGIC: only responding to noxious stimuli

## 2019-01-28 ENCOUNTER — TRANSCRIPTION ENCOUNTER (OUTPATIENT)
Age: 84
End: 2019-01-28

## 2019-01-28 VITALS — WEIGHT: 138.89 LBS

## 2019-01-28 LAB
CULTURE RESULTS: SIGNIFICANT CHANGE UP
CULTURE RESULTS: SIGNIFICANT CHANGE UP
SPECIMEN SOURCE: SIGNIFICANT CHANGE UP
SPECIMEN SOURCE: SIGNIFICANT CHANGE UP

## 2019-01-28 PROCEDURE — 99285 EMERGENCY DEPT VISIT HI MDM: CPT | Mod: 25

## 2019-01-28 PROCEDURE — 97167 OT EVAL HIGH COMPLEX 60 MIN: CPT

## 2019-01-28 PROCEDURE — 88307 TISSUE EXAM BY PATHOLOGIST: CPT

## 2019-01-28 PROCEDURE — 86900 BLOOD TYPING SEROLOGIC ABO: CPT

## 2019-01-28 PROCEDURE — 82728 ASSAY OF FERRITIN: CPT

## 2019-01-28 PROCEDURE — 99233 SBSQ HOSP IP/OBS HIGH 50: CPT

## 2019-01-28 PROCEDURE — 71045 X-RAY EXAM CHEST 1 VIEW: CPT

## 2019-01-28 PROCEDURE — 74177 CT ABD & PELVIS W/CONTRAST: CPT

## 2019-01-28 PROCEDURE — 80048 BASIC METABOLIC PNL TOTAL CA: CPT

## 2019-01-28 PROCEDURE — 82272 OCCULT BLD FECES 1-3 TESTS: CPT

## 2019-01-28 PROCEDURE — 74176 CT ABD & PELVIS W/O CONTRAST: CPT

## 2019-01-28 PROCEDURE — 88342 IMHCHEM/IMCYTCHM 1ST ANTB: CPT

## 2019-01-28 PROCEDURE — 83690 ASSAY OF LIPASE: CPT

## 2019-01-28 PROCEDURE — 86850 RBC ANTIBODY SCREEN: CPT

## 2019-01-28 PROCEDURE — P9045: CPT

## 2019-01-28 PROCEDURE — 84466 ASSAY OF TRANSFERRIN: CPT

## 2019-01-28 PROCEDURE — 97163 PT EVAL HIGH COMPLEX 45 MIN: CPT

## 2019-01-28 PROCEDURE — 70450 CT HEAD/BRAIN W/O DYE: CPT

## 2019-01-28 PROCEDURE — 88331 PATH CONSLTJ SURG 1 BLK 1SPC: CPT

## 2019-01-28 PROCEDURE — 99239 HOSP IP/OBS DSCHRG MGMT >30: CPT

## 2019-01-28 PROCEDURE — 97535 SELF CARE MNGMENT TRAINING: CPT

## 2019-01-28 PROCEDURE — 96372 THER/PROPH/DIAG INJ SC/IM: CPT | Mod: XU

## 2019-01-28 PROCEDURE — 93005 ELECTROCARDIOGRAM TRACING: CPT

## 2019-01-28 PROCEDURE — 73552 X-RAY EXAM OF FEMUR 2/>: CPT

## 2019-01-28 PROCEDURE — 83605 ASSAY OF LACTIC ACID: CPT

## 2019-01-28 PROCEDURE — 76000 FLUOROSCOPY <1 HR PHYS/QHP: CPT

## 2019-01-28 PROCEDURE — 88341 IMHCHEM/IMCYTCHM EA ADD ANTB: CPT

## 2019-01-28 PROCEDURE — 84100 ASSAY OF PHOSPHORUS: CPT

## 2019-01-28 PROCEDURE — 71275 CT ANGIOGRAPHY CHEST: CPT

## 2019-01-28 PROCEDURE — 85027 COMPLETE CBC AUTOMATED: CPT

## 2019-01-28 PROCEDURE — C1769: CPT

## 2019-01-28 PROCEDURE — 84484 ASSAY OF TROPONIN QUANT: CPT

## 2019-01-28 PROCEDURE — 80053 COMPREHEN METABOLIC PANEL: CPT

## 2019-01-28 PROCEDURE — 87040 BLOOD CULTURE FOR BACTERIA: CPT

## 2019-01-28 PROCEDURE — 36415 COLL VENOUS BLD VENIPUNCTURE: CPT

## 2019-01-28 PROCEDURE — 82140 ASSAY OF AMMONIA: CPT

## 2019-01-28 PROCEDURE — 73060 X-RAY EXAM OF HUMERUS: CPT

## 2019-01-28 PROCEDURE — 85610 PROTHROMBIN TIME: CPT

## 2019-01-28 PROCEDURE — 83880 ASSAY OF NATRIURETIC PEPTIDE: CPT

## 2019-01-28 PROCEDURE — C1889: CPT

## 2019-01-28 PROCEDURE — C1713: CPT

## 2019-01-28 PROCEDURE — 80076 HEPATIC FUNCTION PANEL: CPT

## 2019-01-28 PROCEDURE — 83550 IRON BINDING TEST: CPT

## 2019-01-28 PROCEDURE — 86901 BLOOD TYPING SEROLOGIC RH(D): CPT

## 2019-01-28 PROCEDURE — 83735 ASSAY OF MAGNESIUM: CPT

## 2019-01-28 PROCEDURE — 83540 ASSAY OF IRON: CPT

## 2019-01-28 PROCEDURE — 81001 URINALYSIS AUTO W/SCOPE: CPT

## 2019-01-28 RX ORDER — ROBINUL 0.2 MG/ML
0.2 INJECTION INTRAMUSCULAR; INTRAVENOUS
Qty: 0 | Refills: 0 | COMMUNITY
Start: 2019-01-28

## 2019-01-28 RX ORDER — HYDROMORPHONE HYDROCHLORIDE 2 MG/ML
0.5 INJECTION INTRAMUSCULAR; INTRAVENOUS; SUBCUTANEOUS
Qty: 0 | Refills: 0 | COMMUNITY
Start: 2019-01-28

## 2019-01-28 RX ORDER — SCOPALAMINE 1 MG/3D
1 PATCH, EXTENDED RELEASE TRANSDERMAL
Qty: 0 | Refills: 0 | COMMUNITY
Start: 2019-01-28

## 2019-01-28 RX ORDER — SPIRONOLACTONE 25 MG/1
1 TABLET, FILM COATED ORAL
Qty: 0 | Refills: 0 | COMMUNITY

## 2019-01-28 RX ORDER — METOPROLOL TARTRATE 50 MG
1 TABLET ORAL
Qty: 0 | Refills: 0 | COMMUNITY

## 2019-01-28 RX ORDER — ATORVASTATIN CALCIUM 80 MG/1
1 TABLET, FILM COATED ORAL
Qty: 0 | Refills: 0 | COMMUNITY

## 2019-01-28 RX ORDER — CLOPIDOGREL BISULFATE 75 MG/1
1 TABLET, FILM COATED ORAL
Qty: 0 | Refills: 0 | COMMUNITY

## 2019-01-28 RX ORDER — LETROZOLE 2.5 MG/1
1 TABLET, FILM COATED ORAL
Qty: 0 | Refills: 0 | COMMUNITY

## 2019-01-28 RX ORDER — ERGOCALCIFEROL 1.25 MG/1
1 CAPSULE ORAL
Qty: 0 | Refills: 0 | COMMUNITY

## 2019-01-28 RX ORDER — ZINC OXIDE 200 MG/G
1 OINTMENT TOPICAL
Qty: 0 | Refills: 0 | COMMUNITY
Start: 2019-01-28

## 2019-01-28 RX ADMIN — HYDROMORPHONE HYDROCHLORIDE 0.5 MILLIGRAM(S): 2 INJECTION INTRAMUSCULAR; INTRAVENOUS; SUBCUTANEOUS at 04:02

## 2019-01-28 RX ADMIN — ZINC OXIDE 1 APPLICATION(S): 200 OINTMENT TOPICAL at 07:00

## 2019-01-28 NOTE — DISCHARGE NOTE ADULT - CARE PLAN
Principal Discharge DX:	Breast cancer metastasized to bone  Goal:	The patient will remain comfortable  Assessment and plan of treatment:	recently diagnosed metastatic cancer likely breast primary but not  undergone biopsy   She was discharged from [facility] prior to this admission,  where she was diagnosed with mid shaft humerus fracture   Admitted for left humerus fracture and general weakness/ malaise 2/2 metastatic malignancy.  Patient with minimal responsiveness, family agreeing to withdrawal of care, in patient Hospice  Secondary Diagnosis:	Cirrhosis of liver  Assessment and plan of treatment:	Prognosis is grave with hx of cirrhosis, ascites.  Secondary Diagnosis:	Humerus fracture  Assessment and plan of treatment:	pathological fracture  pain control  Secondary Diagnosis:	NSTEMI (non-ST elevated myocardial infarction)  Assessment and plan of treatment:	Supportive care  Secondary Diagnosis:	Leukocytosis, unspecified type  Assessment and plan of treatment:	started  on IV Rocephin for suspected SBP.  Secondary Diagnosis:	CKD (chronic kidney disease) Principal Discharge DX:	Breast cancer metastasized to bone  Goal:	The patient will remain comfortable  Assessment and plan of treatment:	recently diagnosed metastatic cancer likely breast primary but not  undergone biopsy   She was discharged from [facility] prior to this admission,  where she was diagnosed with mid shaft humerus fracture   Admitted for left humerus fracture and general weakness/ malaise 2/2 metastatic malignancy.  Patient with minimal responsiveness, family agreeing to withdrawal of care, in patient Hospice  Secondary Diagnosis:	Cirrhosis of liver  Assessment and plan of treatment:	Prognosis is grave with hx of cirrhosis, ascites.  Secondary Diagnosis:	Humerus fracture  Assessment and plan of treatment:	pathological fracture  pain control  Secondary Diagnosis:	NSTEMI (non-ST elevated myocardial infarction)  Assessment and plan of treatment:	Supportive care  Secondary Diagnosis:	Leukocytosis, unspecified type  Assessment and plan of treatment:	started  on IV Rocephin for suspected SBP.  Secondary Diagnosis:	CKD (chronic kidney disease)  Assessment and plan of treatment:	Worsening renal function  Maintain chawla for comfort

## 2019-01-28 NOTE — GOALS OF CARE CONVERSATION - PERSONAL ADVANCE DIRECTIVE - NS PRO AD PATIENT TYPE ON CHART
Health Care Proxy (HCP)/Living Will/Do Not Resuscitate (DNR)/Medical Orders for Life-Sustaining Treatment (MOLST)
Do Not Resuscitate (DNR)/Medical Orders for Life-Sustaining Treatment (MOLST)/Living Will/Health Care Proxy (HCP)
Medical Orders for Life-Sustaining Treatment (MOLST)/Living Will/Health Care Proxy (HCP)/Do Not Resuscitate (DNR)
Do Not Resuscitate (DNR)/Living Will/Health Care Proxy (HCP)/Medical Orders for Life-Sustaining Treatment (MOLST)

## 2019-01-28 NOTE — GOALS OF CARE CONVERSATION - PERSONAL ADVANCE DIRECTIVE - NS PRO AD PATIENT TYPE
Do Not Resuscitate (DNR)/Medical Orders for Life-Sustaining Treatment (MOLST)
Medical Orders for Life-Sustaining Treatment (MOLST)/Do Not Resuscitate (DNR)
Medical Orders for Life-Sustaining Treatment (MOLST)/Do Not Resuscitate (DNR)
Do Not Resuscitate (DNR)/Medical Orders for Life-Sustaining Treatment (MOLST)

## 2019-01-28 NOTE — DISCHARGE NOTE ADULT - CARE PROVIDER_API CALL
Issa Evangelista (DO), Family Medicine  [address]  [address]  [place] [address]  Phone: [phone]  Fax: [phone]    Dr. Presley,   Phone: (   )    -  Fax: (   )    -

## 2019-01-28 NOTE — PROGRESS NOTE ADULT - PROBLEM SELECTOR PROBLEM 2
Fecal impaction
Altered mental status
Altered mental status
CKD (chronic kidney disease)
Altered mental status

## 2019-01-28 NOTE — GOALS OF CARE CONVERSATION - PERSONAL ADVANCE DIRECTIVE - WHAT MATTERS MOST
For the patient to be well cared for and free of any pain or discomfort.
For the patient to be well cared for and free of any pain or discomfort.
comfort

## 2019-01-28 NOTE — DISCHARGE NOTE ADULT - PLAN OF CARE
The patient will remain comfortable recently diagnosed metastatic cancer likely breast primary but not  undergone biopsy   She was discharged from [facility] prior to this admission,  where she was diagnosed with mid shaft humerus fracture   Admitted for left humerus fracture and general weakness/ malaise 2/2 metastatic malignancy.  Patient with minimal responsiveness, family agreeing to withdrawal of care, in patient Hospice Prognosis is grave with hx of cirrhosis, ascites. pathological fracture  pain control Supportive care started  on IV Rocephin for suspected SBP. Worsening renal function  Maintain chawla for comfort

## 2019-01-28 NOTE — PROGRESS NOTE ADULT - PROVIDER SPECIALTY LIST ADULT
Cardiology
Cardiology
Hospitalist
Nephrology
Nephrology
Orthopedics
Palliative Care
Palliative Care
Anesthesia
Hospitalist
Hospitalist
Infectious Disease
Gastroenterology
Palliative Care

## 2019-01-28 NOTE — DISCHARGE NOTE ADULT - MEDICATION SUMMARY - MEDICATIONS TO STOP TAKING
I will STOP taking the medications listed below when I get home from the hospital:    aspirin 81 mg oral delayed release tablet  -- 1 tab(s) by mouth once a day    Coreg 3.125 mg oral tablet  -- 1 tab(s) by mouth 2 times a day    Lasix 20 mg oral tablet  -- 1 tab(s) by mouth 2 times a day    oxyCODONE 5 mg oral tablet  -- 1 tab(s) by mouth 4 times a day, As Needed MDD:4  -- Caution federal law prohibits the transfer of this drug to any person other  than the person for whom it was prescribed.  It is very important that you take or use this exactly as directed.  Do not skip doses or discontinue unless directed by your doctor.  May cause drowsiness.  Alcohol may intensify this effect.  Use care when operating dangerous machinery.  This prescription cannot be refilled.  Using more of this medication than prescribed may cause serious breathing problems.    Plavix 75 mg oral tablet  -- 1 tab(s) by mouth once a day    metoprolol succinate 25 mg oral tablet, extended release  -- 1 tab(s) by mouth once a day    Lipitor 20 mg oral tablet  -- 1 tab(s) by mouth once a day    Aldactone 100 mg oral tablet  -- 1 tab(s) by mouth once a day    letrozole 2.5 mg oral tablet  -- 1 tab(s) by mouth once a day    calcium (as calcium citrate) 250 mg oral tablet  -- 1 tab(s) by mouth 2 times a day    ergocalciferol 2000 intl units oral capsule  -- 1 cap(s) by mouth once a day

## 2019-01-28 NOTE — PROGRESS NOTE ADULT - NSHPATTENDINGPLANDISCUSS_GEN_ALL_CORE
Dr Aden/Anastasiya NP
family at bedside, RN
patient, RN, NP, son at bedside,palliative
patient, family at bedside, RN
patient, family at bedside, RN
patient, family at bedside, RN, NP, SW
son, daughter, NP, RN,SW, palliative care NP
NP Gisela
patient, RN< NP, son at bedside,ID, palliative
Dr. Aden, Anastasiya Erwin NP, RN
Dr. Ambrose, Anastasiya Erwin NP

## 2019-01-28 NOTE — PROGRESS NOTE ADULT - PROBLEM SELECTOR PLAN 2
mineral oil enema bid ( if not getting lactulose enema)
Patient is awake today and alert but not oriented to surroundings
Abbasi to drainage  COMFORT MEASURES
Patient had change in mentation overnight events now unresponsive - unable to follow commands - lethargic - dyspneic   Patient placed on comfort measures

## 2019-01-28 NOTE — DISCHARGE NOTE ADULT - PATIENT PORTAL LINK FT
You can access the [facility] Patient Portal, offered by [facility], by registering with the following website: [email or web address]

## 2019-01-28 NOTE — PROGRESS NOTE ADULT - PROBLEM SELECTOR PLAN 1
Follows up outpatient with [facility] -Dr. Presley ([facility])   NOW ON COMFORT MEASURES/ HOSPICE.

## 2019-01-28 NOTE — GOALS OF CARE CONVERSATION - PERSONAL ADVANCE DIRECTIVE - CONVERSATION/DISCUSSION
Hospice Referral
Diagnosis/Hospice Referral/Prognosis/Treatment Options/Palliative Care Referral/MOLST Discussed

## 2019-01-28 NOTE — PROGRESS NOTE ADULT - ASSESSMENT
This is an 83 year old female PMHx metastatic breast CA (treated outpatient at [facility]) CKD, aortic stenosis, CAD with stent x 2, CHF, liver cirrhosis, abdmitted with weakness. Found to have fracture of left humerous - s/p IMN POD # 6 now  UNRESPONSIVE then placed on  COMFORT MEASURES POST FAMILY MEETING   Over this weekend patient rallied and became alert (still confused) family decided to place patient on abx and restart cardiac medications given her change in alertness  Today 01.28.19 patient is back to being unresponsive and lethargic - spoke to Greg today regarding plan - they are agreeable to comfort measures, discontinuing IV antibiotics and PO medications and only having her on comfort medications including robinul, dilaudid, ativan, and scopalamine patch - No rrt, no vital signs, no radiologic exams , no blood draws   Family met with hospice and approved for [facility]

## 2019-01-28 NOTE — DISCHARGE NOTE ADULT - SECONDARY DIAGNOSIS.
Cirrhosis of liver Humerus fracture NSTEMI (non-ST elevated myocardial infarction) Leukocytosis, unspecified type CKD (chronic kidney disease)

## 2019-01-28 NOTE — DISCHARGE NOTE ADULT - HOSPITAL COURSE
83y old female with PMHx of HTN, CKD stage 3, aortic stenosis, chronic thrombocytopenia, CAD stent x 2, CHF, liver cirrhosis, recently diagnosed metastatic cancer likely breast primary but not  undergone biopsy, who presented to [facility] ED with weakness. She was discharged from [facility] one day prior where she was diagnosed with mid shaft humerus fracture, and general weakness - was placed in sling and discharged to f/u with orthopedist.  Outpatient, her daughter / son while attempting to help the patient get out of the car, they heard a "crack" in her arm with persistent LUE pain and generalized weakness/ malaise hence they activated EMS and Pt was taken by ambulance to [facility] ED. Admitted for left humerus fracture and general weakness/ malaise 2/2 metastatic malignancy. Developed  leukocytosis and abdominal pain and minimal responsiveness  family agreeing to withdrawal of care, Hospice.  Prognosis is grave with hx of cirrhosis, ascites and metastatic breast cancer.     Vital Signs Last 24 Hrs  T(C): 36.3 (28 Jan 2019 08:02), Max: 36.3 (28 Jan 2019 08:02)  T(F): 97.3 (28 Jan 2019 08:02), Max: 97.3 (28 Jan 2019 08:02)  HR: 60 (28 Jan 2019 08:02) (50 - 64)  BP: 121/56 (28 Jan 2019 08:02) (114/57 - 121/56)  BP(mean): --  RR: 17 (28 Jan 2019 08:02) (17 - 18)  SpO2: --                CAPILLARY BLOOD GLUCOSE      ROS:  unable to assess due to current mental status      GENERAL: Not in distress. Minimally responsive  HEENT:  Normocephalic and atraumatic. PEARLA, EOMI  NECK: Supple.  No JVD.    CARDIOVASCULAR: RRR S1, S2. No murmur/rubs/gallop  LUNGS: Unlabored, without wheeze  ABDOMEN: ND. Soft, BS normoactive  NEUROLOGIC: Lethargic, minimally responsive to noxious stimuli

## 2019-01-28 NOTE — PROGRESS NOTE ADULT - REASON FOR ADMISSION
Humerus fracture/ weakness.

## 2019-01-28 NOTE — PROGRESS NOTE ADULT - SUBJECTIVE AND OBJECTIVE BOX
This is a Palliative Care Followup  HPI:  History obtained from pt and her daughter/ son at bedside, briefly she is 83y old female with PMHx of HTN, CKD stage 3, aortic stenosis, chronic thrombocytopenia, CAD stent x 2, CHF, liver cirrhosis, recently diagnosed metastatic cancer likely breast primary but have undergone biopsy, who presents to [facility] ED with weakness for 1 day. She was discharged from [facility] yesterday where she was diagnosed with mid shaft humerus fracture, and general weakness - was placed in sling and discharged to f/u with orthopedist outpatient, her daughter / son while attempting to help the patient get out of the car, they heard a "crack" in her arm with persistent LUE pain and generalized weakness/ malaise hence they activated EMS and Pt was taken by ambulance to [facility] ED. Pt reports left shoulder pain, continuos, 6/10 in severity, with limited ROM. She offers no other complaints but she is very poor historian. No reported fever, chills, nausea, vomiting, headache, dizziness, chest pain, SOB, palpitation. (15 Ronni 2019 09:13)<end of copied text>    CC: "heard a crack"    Present Symptoms:   Dyspnea: 1   Nausea/Vomiting: Unable to obtain due to poor mentation   Anxiety:  Unable to obtain due to poor mentation   Depression: Unable to obtain due to poor mentation   Fatigue: Yes   Loss of appetite: Yes   Constipation: Unable to obtain due to poor mentation     Pain: Appears comfortable - Unable to obtain due to poor mentation             Character-            Duration-            Effect-            Factors-            Frequency-            Location-            Severity-    Review of Systems: Reviewed  Unable to obtain due to poor mentation     MEDICATIONS  (STANDING):  atorvastatin 20 milliGRAM(s) Oral at bedtime  cefTRIAXone   IVPB 1 Gram(s) IV Intermittent every 24 hours  cefTRIAXone   IVPB      clopidogrel Tablet 75 milliGRAM(s) Oral daily  lactulose Syrup 20 Gram(s) Oral three times a day  metoprolol succinate ER 25 milliGRAM(s) Oral daily  scopolamine   Patch 1 Patch Transdermal every 72 hours  sodium chloride 0.9%. 1000 milliLiter(s) (50 mL/Hr) IV Continuous <Continuous>  spironolactone 100 milliGRAM(s) Oral daily  zinc oxide 40% Ointment 1 Application(s) Topical two times a day    MEDICATIONS  (PRN):  glycopyrrolate Injectable 0.2 milliGRAM(s) IV Push every 6 hours PRN EXCESSIVE SECRETIONS  HYDROmorphone  Injectable 0.5 milliGRAM(s) IV Push every 4 hours PRN DYSPNEA/PAIN  lidocaine   Patch 1 Patch Transdermal daily PRN pain  LORazepam   Injectable 1 milliGRAM(s) IV Push every 6 hours PRN AGITATION/ANXIETY      PHYSICAL EXAM:    Vital Signs Last 24 Hrs  T(C): 36.3 (28 Jan 2019 08:02), Max: 36.3 (28 Jan 2019 08:02)  T(F): 97.3 (28 Jan 2019 08:02), Max: 97.3 (28 Jan 2019 08:02)  HR: 60 (28 Jan 2019 08:02) (50 - 64)  BP: 121/56 (28 Jan 2019 08:02) (114/57 - 121/56)  BP(mean): --  RR: 17 (28 Jan 2019 08:02) (17 - 18)  SpO2: --    General: lethargic , coma    HEENT: dry mouth     Lungs: mild dyspnea    CV: normal      GI: fecal incontinence    : oliguria hematuria  chawla    MSK: weakness  edema +1 BLE  bedbound    Skin: thin frail ecchymotic skin  LABS:    I&O's Summary    27 Jan 2019 07:01  -  28 Jan 2019 07:00  --------------------------------------------------------  IN: 0 mL / OUT: 1000 mL / NET: -1000 mL        RADIOLOGY & ADDITIONAL STUDIES:  CT abd 01.23.19    IMPRESSION:   Moderate fecal retention in the rectum with evidence of stercoral   proctitis.  Increasing moderate ascites and diffuse body wall edema, likely related   to cirrhosis and portal hypertension.  Right breast mass again seen with metastatic disease to the lungs and   bone as above.      ADVANCE DIRECTIVES:   DNR DNI MOLST

## 2019-01-28 NOTE — DISCHARGE NOTE ADULT - MEDICATION SUMMARY - MEDICATIONS TO TAKE
I will START or STAY ON the medications listed below when I get home from the hospital:    HYDROmorphone  -- 0.5 milligram(s) intravenous every 4 hours, As Needed  -- Indication: For Pain/SOB    LORazepam  -- 1 milligram(s) intravenous every 6 hours, As Needed  -- Indication: For Anxiety    scopolamine 1.5 mg transdermal film, extended release  -- 1  by transdermal patch every 72 hours  -- Indication: For Secretions    zinc oxide 40% topical ointment  -- 1 application on skin 2 times a day  -- Indication: For rash    glycopyrrolate 0.2 mg/mL injectable solution  -- 0.2 milligram(s) injectable every 6 hours, As Needed  -- Indication: For Secretions

## 2019-01-28 NOTE — PROGRESS NOTE ADULT - PROBLEM SELECTOR PROBLEM 1
Other cirrhosis of liver
Breast cancer metastasized to bone
Ascites

## 2019-01-28 NOTE — GOALS OF CARE CONVERSATION - PERSONAL ADVANCE DIRECTIVE - CONVERSATION DETAILS
[facility] RN Note  Met with pt son and dtr at  bedside who stated that pt was taking water and smoothie by mouth since she was awake and alert. They were informed that pt does not meet inpt criteria at this present time as she is taking po, and they verbalized understanding. NP Anastasiya Waite will trial pt on po dilaudid and reassess tomorrow. Family thinking about [facility] placement, as they are concerned about lack of assistance at home and available aide hrs are not enough for them. The stated that they cannot private hire. GOSIA Pina aware and spoke with family. Family also refusing to sign consents at this time. Pt to be reassessed tomorrow.  Joaquin Orozco RN
Hospice services discussed with the patient's two adult children. Inpatient hospice options and criteria discussed. Short term nature of an inpatient stay explained. Hospice consents explained and given to family to review. Family feels that inpatient hospice at [facility] would benefit the patient and be able to care for her symptoms. Patient has been approved for inpatient hospice by Dr Roman.  Family would like to speak with another sibling who is out of state before making a final decision. Hospice contact information given to family. Kev ALLEN
Patient to be transferred to the [facility] on 01/28/19 at 1PM via ambulance. Family aware and in agreement to the plan. Kev ALLEN
-Met with patient at bedside who is unresponsive, lethargic, dyspneic, appears in discomfort  - Family meeting held in conference room with Daughter and Son (Kyleigh and Johnny surrogate/HCP) along with Anastasiya Erwin NP from [facility]  - Discussed prognosis, plan of care, goals, wishes for end of life care   - After lengthy discussion in regards to treatment and advance directives patient was placed on comfort measures and hospice evaluation by the HCP adult children.   - Prior to conversation patient was DNR DNI at this time family decided to place patient in addition to DNR DNI, comfort measures, no iv fluids, no antibiotics, no rehospitalization, no peg/tube feedings. Discussed comfort measures including in RRT, no blood draws, no radiologic exams, no vital signs, no PO medications, may have comfort feeds and oral care -surrogate Kyleigh and Johnny in agreement to comfort measures - also discussed medications including Dilaudid, Robinul, scopolamine patch, ativan which may decrease respiratory rate all for end of life comfort care measures - both Johnny and Kyleigh in agreement to carry forward with orders after education was provided.  - Spoke with Dr. Aden and RN caring for patient and updated them on the plan of care moving forward post family meeting.  - New MOLST completed and placed in chart - older MOLST was voided   ====================================================  COMFORT MEDICATIONS:  MEDICATIONS  (PRN):  glycopyrrolate Injectable 0.2 milliGRAM(s) IV Push every 6 hours PRN EXCESSIVE SECRETIONS  HYDROmorphone  Injectable 0.5 milliGRAM(s) IV Push every 4 hours PRN DYSPNEA/PAIN  lidocaine   Patch 1 Patch Transdermal daily PRN pain  LORazepam   Injectable 1 milliGRAM(s) IV Push every 6 hours PRN AGITATION/ANXIETY  scopolamine   Patch 1 Patch Transdermal every 72 hours  ===========================================  Total time spent 60 minutes  Time spent on GOC 30 minutes

## 2019-01-28 NOTE — PROGRESS NOTE ADULT - PROBLEM SELECTOR PLAN 4
-Over this weekend patient rallied and became alert (still confused) family decided to place patient on abx and restart cardiac medications given her change in alertness  -Today 01.28.19 patient is back to being unresponsive and lethargic - spoke to Greg today regarding plan - they are agreeable to comfort measures, discontinuing IV antibiotics and PO medications and only having her on comfort medications including robinul, dilaudid, ativan, and scopalamine patch - No rrt, no vital signs, no radiologic exams , no blood draws   - Spoke with Anastasiya Erwin NP of [facility] and Dr. Ambrose regarding family's wishes for patient going forward full comfort measures  - At this time will continue to support and monitor    Total time spent 30 minutes    Thank you for the opportunity to assist with the care of this patient.   [facility] Consult Service [phone]. -Over this weekend patient rallied and became alert (still confused) family decided to place patient on abx and restart cardiac medications given her change in alertness  -Today 01.28.19 patient is back to being unresponsive and lethargic - spoke to Greg today regarding plan - they are agreeable to comfort measures, discontinuing IV antibiotics and PO medications and only having her on comfort medications including robinul, dilaudid, ativan, and scopalamine patch - No rrt, no vital signs, no radiologic exams , no blood draws   - Spoke with Anastasiya Erwin NP of [facility] and Dr. Ambrose regarding family's wishes for patient going forward full comfort measures - family doesn't want IV antibiotics or to continue with PO medications including cardiovascular medications - Dr. Ambrose aware  - At this time will continue to support and monitor    Total time spent 30 minutes    Thank you for the opportunity to assist with the care of this patient.   [facility] Consult Service [phone].

## 2019-01-29 LAB — PATH REPORT ADDENDUM.SYNOPTIC DOC: SIGNIFICANT CHANGE UP

## 2019-01-30 ENCOUNTER — APPOINTMENT (OUTPATIENT)
Dept: HEMATOLOGY ONCOLOGY | Facility: CLINIC | Age: 84
End: 2019-01-30

## 2019-02-01 ENCOUNTER — RESULT REVIEW (OUTPATIENT)
Age: 84
End: 2019-02-01

## 2019-02-01 LAB — SURGICAL PATHOLOGY STUDY: SIGNIFICANT CHANGE UP

## 2019-02-05 ENCOUNTER — APPOINTMENT (OUTPATIENT)
Dept: GASTROENTEROLOGY | Facility: CLINIC | Age: 84
End: 2019-02-05

## 2019-02-28 ENCOUNTER — APPOINTMENT (OUTPATIENT)
Dept: GASTROENTEROLOGY | Facility: CLINIC | Age: 84
End: 2019-02-28

## 2020-02-14 NOTE — DISCHARGE NOTE ADULT - FUNCTIONAL SCREEN CURRENT LEVEL: BATHING, MLM
Prior authorization submitted - will update provider when decision has been made by insurance.   CoverMyMeds Key: [id number]      4 = completely dependent

## 2020-10-08 NOTE — ED ADULT TRIAGE NOTE - MEANS OF ARRIVAL
Physical Therapy Progress Note     Name: Rick Sierra  Clinic Number: [id number]    Therapy Diagnosis:   Encounter Diagnoses   Name Primary?    Decreased strength, endurance, and mobility     Impaired range of motion of left knee      Physician: Jerson Torres MD    Visit Date: 10/8/2020    Physician Orders: PT Eval and Treat  Medical Diagnosis from Referral: Rupture of anterior cruciate ligament of left knee subsequent encounter  Evaluation Date: 6/4/2020  Authorization Period Expiration: 12/31/2020  Plan of Care Expiration: 11/13/2020  Visit # / Visits authorized: 30/ 30    Time In: 11:47AM  Time Out: 12:30PM  Total Billable Time: 43 minutes    Precautions: Standard and L ACL reconstruction with partial thickness quadriceps tendon autograft and medial meniscus repair 6/2/2020    Subjective     Pt reports: that her knee is hurting a little with soreness present. Pt reports that this occurred following work yesterday. Pt reports that she did not have the MRI performed because after arriving they were going to make her wait over an hour after her scheduled appointment time and she did not want to wait.     She was not compliant with home exercise program.  Response to previous treatment: Muscle soreness following session  Functional change: Adequate knee range of motion, quad activation and gait pattern. Fatigue and soreness after working shifts    Pain: 0/10  Location: left knee      Objective     Update:   Objective information below is from initial evaluation unless bolded today.     Range of Motion/Strength:      Knee Right Left Pain/Dysfunction with Movement Goal   PROM           Flexion (135)  140  129 Pinching in left knee at end range 140 No pain   Extension (0)  3 hyperextension 1 hyperextension   No pain 0 No pain       L/E MMT Right Left Pain/Dysfunction with Movement Goal   Hip Flexion 4+/5 4-/5 No pain 4+/5 B No pain   Hip Extension 4+/5 4-/5 No pain 4+/5 B No pain   Hip Abduction 4+/5 4-/5 No  pain 4+/5 B No pain   Hip Adduction 4+/5 4-/5 No pain 4+/5 B No pain   Knee Flexion 4+/5 4-/5 No pain 4+/5 B No pain   Knee Extension 4+/5 4-/5 Pain present in left knee 4+/5 B No pain      Rick received therapeutic exercises to develop strength, endurance, ROM, flexibility, posture and core stabilization for 23 minutes including:   Supine long arc quads isometric hold for 10 seconds followed by eccentric lowering 2x10 reps with 3 lb leg weight  Straight leg raises into flexion 2x15 reps with 3 lb leg weights   Straight leg raises into abduction 2x15 reps, into adduction 2x15 reps with 3 lb leg weights  Standing hamstring curls 2x10 reps with 4 lb leg weight deferred today   Standing hip abduction and (hip extension deferred today) 2x10 reps on each lower extremity   PROM knee flexion for 3 minutes deferred today   Standing heel raises 3x10 reps deferred today  Recumbent bike working on improving functional range of motion and strength for 5 minutes  Single leg bridges with ab bracing 2x15 reps with emphasis on neutral pelvis deferred today   Standing total knee extension with blue theraband 3x10 reps deferred today  Static hold on inclined trampoline for 3x20 second holds on left deferred today   Hamstring curls using theraball x10 reps deferred today   Shuttle squats double leg with 5 resistance bands 2x10 reps deferred today   Shuttle squats right left lower extremity with 3 resistance bands 3x10 reps deferred today     Rick participated in dynamic functional therapeutic activities to improve functional performance for 20 minutes, including:  Step ups onto 6 inch step 2x15 reps with left lower extremity deferred today   Step downs from 6 inch step 2x15 reps with left lower extremity as the stance leg deferred today  Squatting working on form x10 reps with emphasis on equal weightbearing deferred today   Wall squats 3x10 reps deferred today  Shuttle heel raises with 3 resistance bands 3x10 reps deferred  today   Lunges forward and backward working on slowing speed, improving form for 5 minutes deferred today   Shuttle squatting jumps with 3 resistance bands working on equal loading, hip control, knee positioning and proper form for 3 minutes  Single leg squat to chair with airex pad for 3 minutes performed working on left on proper form with gluteal activation and correcting knee positioning  Squat jumps on turf with brace donned for 3 minutes  Alternating single leg hops with brace donned for 2 minutes deferred today   Jogging and back pedaling working on equal weightbearing and decreasing compensations while increasing pt confidence in knee for 5 minutes  Side shuffling with brace donned for 3 minutes    Rick received the following manual therapy techniques: Soft tissue Mobilization were applied to the: left knee for 0 minutes, including:  Myofascial release of rectus femoris, vastus lateralis    Home Exercises Provided and Patient Education Provided     Education provided:   - Pt educated to continue working on HEP exercises and really working to improve knee extension as it is crucial during this stage of healing.     Written Home Exercises Provided: Patient instructed to cont prior HEP.  Exercises were reviewed and Rick was able to demonstrate them prior to the end of the session.  Rick demonstrated good  understanding of the education provided.     See EMR under Patient Instructions for exercises provided 6/4/2020.    Assessment     Pt is making slow progressions in strength as she has been almost non compliant with HEP only performing when she remembers although has been more consistent within the last week. Began progressing strengthening to incorporate more jogging, shuttle jumping, side shuffling, etc as she is becoming stronger. Pt becomes fatigued very quickly with these activities. Pt reported some pain with activities although very minimal.     Rick is progressing well towards her goals.    Pt prognosis is Good.     Pt will continue to benefit from skilled outpatient physical therapy to address the deficits listed in the problem list box on initial evaluation, provide pt/family education and to maximize pt's level of independence in the home and community environment.     Pt's spiritual, cultural and educational needs considered and pt agreeable to plan of care and goals.     Anticipated barriers to physical therapy: none    Goals:   Short Term Goals:  6 weeks   1. Pain: Pt will demonstrate improved pain by reports of less than or equal to 5/10 worst pain on the verbal rating scale in order to progress toward maximal functional ability and improve QOL. Met 7/7/2020   2. Function: Patient will demonstrate improved function as indicated by a score of less than or equal to 75% impairment on FOTO.    3. Mobility: Patient will improve AROM knee to 50% of stated goals, listed in objective measures above, in order to progress towards independence with functional activities. Met 7/7/2020   4. Strength: Patient will improve strength to 50% of stated goals, listed in objective measures above, in order to progress towards independence with functional activities. Improving 10/8/2020   5. Gait: Patient will demonstrate improved gait mechanics including partial weightbearing with axillary crutches in order to improve functional mobility, improve quality of life.  Met   6. HEP: Patient will demonstrate independence with HEP in order to progress toward functional independence. Improving 10/8/2020      Long Term Goals:  12 months   1. Pain: Pt will demonstrate improved pain by reports of less than or equal to 0/10 worst pain on the verbal rating scale in order to progress toward maximal functional ability and improve QOL.  Met 8/20/2020   2. Function: Patient will demonstrate improved function as indicated by a score of less than or equal to 50% impairment on FOTO.    3. Mobility: Patient will improve AROM to stated  stretcher goals, listed in objective measures above, in order to return to maximal functional potential and improve quality of life. Met 8/20/2020   4. Strength: Patient will improve strength to stated goals, listed in objective measures above, in order to improve functional independence and quality of life. Progressing 10/8/2020   5. Gait: Patient will demonstrate normalized gait mechanics without axillary crutches with minimal compensation in order to return to PLOF. Met 10/8/2020   6. Patient will return to normal recreational activities with less than or equal to 0/10 pain and maximal function. Progressing 10/8/2020   7. Patient will be able to perform single leg hop test with 95% of opposite leg to decrease chances of re injury when returning to regular activities.   8. Patient will be able to perform single leg vertical jump with 95% of opposite leg to decrease chances of re injury when returning to regular activities.   9. Patient will be able to perform timed 60 second single leg squat test with 90% of opposite leg  to decrease chances of re injury when returning to regular activities.       Plan     Continue POC and frequency as planned. Progress functional strengthening as tolerated by the pt.      These services are reasonable and necessary for the conditions set forth above while under my care.    Shaina Rodarte, PT, DPT

## 2021-01-01 NOTE — DISCHARGE NOTE ADULT - NS MD DC FALL RISK RISK
For information on Fall & Injury Prevention, visit [email or web address] initiation of breastfeeding/breast milk feeding

## 2021-04-02 NOTE — ED PROVIDER NOTE - DURATION
DISPLAY PLAN FREE TEXT DISPLAY PLAN FREE TEXT day(s) DISPLAY PLAN FREE TEXT DISPLAY PLAN FREE TEXT DISPLAY PLAN FREE TEXT DISPLAY PLAN FREE TEXT

## 2021-04-27 NOTE — DISCHARGE NOTE ADULT - LAUNCH MEDICATION RECONCILIATION
Discharge Summary  Discharge Summary from Physical Therapy Report    Patient: Gideon Fields   : 1970  Diagnosis/ICD-10 Code:  Chronic right-sided thoracic back pain [M54.6, G89.29]  Referring practitioner: NEO Recinos  Date of Initial Visit: 3/17/2021      Dates  PT visit: 3/17/21 to 21  Number of Visits: 4    Discharge Status of Patient: on 21 Gideon reported that both upper and lower back feeling better: resolved with exercise. She subsequently called to request discharge prior to next visit.     Goals: All Met    SHORT TERM GOALS: Time for Goal Achievement: 4 weeks    1.  Patient to be compliant w/ the HEP and tolerate progression.    MET                        2.  Pain level < 6/10 at worst with mentioned activities to improve function. MET  3.  Increased lumbar and thoracic AROM to by 25% in all planes to allow for increased ease with sit-stand transfers and functional activities. MET    LONG TERM GOALS: Time for Goal Achievement: D/C  1.  Outcome survey to show significant improvement. MET  2.  Pain level < 2/10 with all listed activities to return to normal.MET  3.  Lumbar and thoracic AROM to WFL to allow for return to household, work & recreational activities w/o increase in symptoms. MET  4.  (B) LE and lower abdominal strength to 5/5 to allow for pushing, pulling and activities to occur without pain (driving, sitting, household  & Job requirements). MET    Date of Discharge 21        José Luis Kingsley, PT  Physical Therapist                      
<<-----Click here for Discharge Medication Review

## 2022-05-06 NOTE — PHYSICAL THERAPY INITIAL EVALUATION ADULT - BALANCE DISTURBANCE, IDENTIFIED IMPAIRMENT CONTRIBUTE, REHAB EVAL
Unfortunately I do not have any other ideas to help with that.   pain/decreased strength/decreased ROM

## 2022-05-18 NOTE — PHYSICAL THERAPY INITIAL EVALUATION ADULT - DIAGNOSIS, PT EVAL
Functional decline S/P IMN left closed displaced comminuted fracture of shaft of left Humerus, due to Metastatic CA, NSTE MI, Metastatic C Advancement-Rotation Flap Text: The defect edges were debeveled with a #15 scalpel blade.  Given the location of the defect, shape of the defect and the proximity to free margins an advancement-rotation flap was deemed most appropriate.  Using a sterile surgical marker, an appropriate flap was drawn incorporating the defect and placing the expected incisions within the relaxed skin tension lines where possible. The area thus outlined was incised deep to adipose tissue with a #15 scalpel blade.  The skin margins were undermined to an appropriate distance in all directions utilizing iris scissors.

## 2022-10-13 PROBLEM — I51.9 LEFT VENTRICULAR SYSTOLIC DYSFUNCTION: Status: ACTIVE | Noted: 2018-06-25

## 2023-01-19 NOTE — H&P PST ADULT - PRO INTERPRETER NEED 2
Seems to have declined since discharge. Will have speech/OT/PT re eval today   Plan to go back to [place] for SKILL today      English

## 2025-02-27 NOTE — PROCEDURE NOTE - NSANESTHESIA_GEN_A_CORE
1% lidocaine
[FreeTextEntry1] : Gait: Presents ambulating independently without signs of antalgia. Good coordination and balance noted. GENERAL: alert, cooperative, in NAD SKIN: The skin is intact, warm, pink and dry over the area examined. EYES: Normal conjunctiva, normal eyelids and pupils were equal and round. ENT: normal ears, normal nose and normal lips. CARDIOVASCULAR: brisk capillary refill, but no peripheral edema. RESPIRATORY: The patient is in no apparent respiratory distress. They're taking full deep breaths without use of accessory muscles or evidence of audible wheezes or stridor without the use of a stethoscope. Normal respiratory effort. ABDOMEN: not examined  Right 2nd and 4th fingers:  Full extension at the metacarpophalangeal joint, PIP and DIP joints however moderate stiffness at the metacarpophalangeal joint, PIP and DIP joints with flexion.  Positive edema noted over the proximal and middle phalanxes.  Neurologically intact with full sensation to palpation.  4 5 muscle strength.  The metacarpophalangeal joints are stable with stress maneuvers.

## 2025-03-11 NOTE — ED ADULT TRIAGE NOTE - MODE OF ARRIVAL
Urine cx showing enterococcus bacteria, please finish cipro and follow up with PCP if sx persist.
Walk in

## 2025-06-18 NOTE — PATIENT PROFILE ADULT. - NS SC CAGE ALCOHOL GUILTY ABOUT
Airway    Performed by: Joss Goodrich DDS  Authorized by: Valerio Lee MD    Final Airway Type:  Endotracheal airway  Final Endotracheal Airway*:  ETT  ETT Size (mm)*:  7.5  Cuff*:  Regular  Technique Used for Successful ETT Placement:  Video laryngoscopy  Devices/Methods Used in Placement*:  Mask, Bag Valve, Oral ETT and Stylet  Intubation Procedure*:  ETCO2, Preoxygenation, Atraumatic, Dentition Unchanged and Pharynx Clear  Insertion Site:  Oral  Blade Type*:  Video Laryngoscope  Blade Size*:  4  Measured from*:  Lips  Secured at (cm)*:  22  Placement Verified by: auscultation, capnometry and palpation of cuff    Glottic View*:  1 - full view of glottis  Attempts*:  1  Number of Other Approaches Attempted:  0   Patient Identified, Procedure confirmed, Emergency equipment available and Safety protocols followed  Location:  OR  Urgency:  Elective  Difficult Airway: No    Indications for Airway Management:  Anesthesia  Spontaneous Ventilation: absent    Sedation Level:  Anesthetized  Mask Difficulty Assessment:  3 - difficult mask (inadequate, unstable or two providers) +/- NMBA  Start Time: 6/18/2025 10:25 AM       no